# Patient Record
Sex: FEMALE
[De-identification: names, ages, dates, MRNs, and addresses within clinical notes are randomized per-mention and may not be internally consistent; named-entity substitution may affect disease eponyms.]

---

## 2018-01-10 ENCOUNTER — HOSPITAL ENCOUNTER (INPATIENT)
Dept: HOSPITAL 88 - ER | Age: 78
LOS: 9 days | Discharge: HOME HEALTH | DRG: 871 | End: 2018-01-19
Attending: INTERNAL MEDICINE | Admitting: INTERNAL MEDICINE
Payer: COMMERCIAL

## 2018-01-10 VITALS — WEIGHT: 115 LBS | BODY MASS INDEX: 21.16 KG/M2 | HEIGHT: 62 IN

## 2018-01-10 DIAGNOSIS — F32.9: ICD-10-CM

## 2018-01-10 DIAGNOSIS — I95.9: ICD-10-CM

## 2018-01-10 DIAGNOSIS — B37.81: ICD-10-CM

## 2018-01-10 DIAGNOSIS — K29.70: ICD-10-CM

## 2018-01-10 DIAGNOSIS — E86.0: ICD-10-CM

## 2018-01-10 DIAGNOSIS — Z79.01: ICD-10-CM

## 2018-01-10 DIAGNOSIS — D64.9: ICD-10-CM

## 2018-01-10 DIAGNOSIS — E87.6: ICD-10-CM

## 2018-01-10 DIAGNOSIS — Z79.82: ICD-10-CM

## 2018-01-10 DIAGNOSIS — N39.0: ICD-10-CM

## 2018-01-10 DIAGNOSIS — A41.9: Primary | ICD-10-CM

## 2018-01-10 DIAGNOSIS — R65.20: ICD-10-CM

## 2018-01-10 DIAGNOSIS — I21.4: ICD-10-CM

## 2018-01-10 DIAGNOSIS — B96.20: ICD-10-CM

## 2018-01-10 DIAGNOSIS — K44.9: ICD-10-CM

## 2018-01-10 DIAGNOSIS — I10: ICD-10-CM

## 2018-01-10 DIAGNOSIS — I48.91: ICD-10-CM

## 2018-01-10 DIAGNOSIS — M19.90: ICD-10-CM

## 2018-01-10 DIAGNOSIS — K52.9: ICD-10-CM

## 2018-01-10 LAB
ALBUMIN SERPL-MCNC: 2.8 G/DL (ref 3.5–5)
ALBUMIN/GLOB SERPL: 0.8 {RATIO} (ref 0.8–2)
ALP SERPL-CCNC: 96 IU/L (ref 40–150)
ALT SERPL-CCNC: 9 IU/L (ref 0–55)
AMPHETAMINES UR QL SCN>1000 NG/ML: NEGATIVE
AMYLASE SERPL-CCNC: 51 U/L (ref 25–125)
ANION GAP SERPL CALC-SCNC: 12.3 MMOL/L (ref 8–16)
ANION GAP SERPL CALC-SCNC: 14.3 MMOL/L (ref 8–16)
BACTERIA URNS QL MICRO: (no result) /HPF
BASOPHILS # BLD AUTO: 0 10*3/UL (ref 0–0.1)
BASOPHILS # BLD AUTO: 0.1 10*3/UL (ref 0–0.1)
BASOPHILS # BLD AUTO: 0.1 10*3/UL (ref 0–0.1)
BASOPHILS NFR BLD AUTO: 0.2 % (ref 0–1)
BASOPHILS NFR BLD AUTO: 0.4 % (ref 0–1)
BASOPHILS NFR BLD AUTO: 0.5 % (ref 0–1)
BENZODIAZ UR QL SCN: POSITIVE
BILIRUB UR QL: (no result)
BNP BLD-MCNC: 95.6 PG/ML (ref 0–100)
BUN SERPL-MCNC: 10 MG/DL (ref 7–26)
BUN SERPL-MCNC: 15 MG/DL (ref 7–26)
BUN/CREAT SERPL: 16 (ref 6–25)
BUN/CREAT SERPL: 20 (ref 6–25)
CALCIUM SERPL-MCNC: 6.8 MG/DL (ref 8.4–10.2)
CALCIUM SERPL-MCNC: 8.2 MG/DL (ref 8.4–10.2)
CHLORIDE SERPL-SCNC: 109 MMOL/L (ref 98–107)
CHLORIDE SERPL-SCNC: 115 MMOL/L (ref 98–107)
CK MB SERPL-MCNC: 3.2 NG/ML (ref 0–5)
CK MB SERPL-MCNC: 8.7 NG/ML (ref 0–5)
CK MB SERPL-MCNC: 9.1 NG/ML (ref 0–5)
CK SERPL-CCNC: 109 IU/L (ref 29–168)
CK SERPL-CCNC: 122 IU/L (ref 29–168)
CK SERPL-CCNC: 55 IU/L (ref 29–168)
CLARITY UR: (no result)
CO2 SERPL-SCNC: 16 MMOL/L (ref 22–29)
CO2 SERPL-SCNC: 18 MMOL/L (ref 22–29)
COLOR UR: YELLOW
DEPRECATED APTT PLAS QN: 26.7 SECONDS (ref 23.8–35.5)
DEPRECATED INR PLAS: 0.98
DEPRECATED NEUTROPHILS # BLD AUTO: 10.2 10*3/UL (ref 2.1–6.9)
DEPRECATED NEUTROPHILS # BLD AUTO: 11.4 10*3/UL (ref 2.1–6.9)
DEPRECATED NEUTROPHILS # BLD AUTO: 7.1 10*3/UL (ref 2.1–6.9)
DEPRECATED PHOSPHATE SERPL-MCNC: 2.6 MG/DL (ref 2.3–4.7)
DEPRECATED RBC URNS MANUAL-ACNC: (no result) /HPF (ref 0–5)
EGFRCR SERPLBLD CKD-EPI 2021: > 60 ML/MIN (ref 60–?)
EGFRCR SERPLBLD CKD-EPI 2021: > 60 ML/MIN (ref 60–?)
EOSINOPHIL # BLD AUTO: 0 10*3/UL (ref 0–0.4)
EOSINOPHIL NFR BLD AUTO: 0 % (ref 0–6)
EOSINOPHIL NFR BLD AUTO: 0.1 % (ref 0–6)
EOSINOPHIL NFR BLD AUTO: 0.1 % (ref 0–6)
EPI CELLS URNS QL MICRO: (no result) /LPF
ERYTHROCYTE [DISTWIDTH] IN CORD BLOOD: 17.4 % (ref 11.7–14.4)
ERYTHROCYTE [DISTWIDTH] IN CORD BLOOD: 17.6 % (ref 11.7–14.4)
ERYTHROCYTE [DISTWIDTH] IN CORD BLOOD: 17.6 % (ref 11.7–14.4)
GLOBULIN PLAS-MCNC: 3.4 G/DL (ref 2.3–3.5)
GLUCOSE SERPLBLD-MCNC: 141 MG/DL (ref 74–118)
GLUCOSE SERPLBLD-MCNC: 147 MG/DL (ref 74–118)
HCT VFR BLD AUTO: 25.5 % (ref 34.2–44.1)
HCT VFR BLD AUTO: 27.9 % (ref 34.2–44.1)
HCT VFR BLD AUTO: 32.9 % (ref 34.2–44.1)
HGB BLD-MCNC: 10.3 G/DL (ref 12–16)
HGB BLD-MCNC: 7.9 G/DL (ref 12–16)
HGB BLD-MCNC: 8.7 G/DL (ref 12–16)
KETONES UR QL STRIP.AUTO: NEGATIVE
LEUKOCYTE ESTERASE UR QL STRIP.AUTO: (no result)
LIPASE SERPL-CCNC: 17 U/L (ref 8–78)
LYMPHOCYTES # BLD: 0.3 10*3/UL (ref 1–3.2)
LYMPHOCYTES # BLD: 0.6 10*3/UL (ref 1–3.2)
LYMPHOCYTES # BLD: 0.7 10*3/UL (ref 1–3.2)
LYMPHOCYTES NFR BLD AUTO: 2.4 % (ref 18–39.1)
LYMPHOCYTES NFR BLD AUTO: 6.1 % (ref 18–39.1)
LYMPHOCYTES NFR BLD AUTO: 8 % (ref 18–39.1)
MAGNESIUM SERPL-MCNC: 1.2 MG/DL (ref 1.3–2.1)
MAGNESIUM SERPL-MCNC: 1.5 MG/DL (ref 1.3–2.1)
MCH RBC QN AUTO: 26.8 PG (ref 28–32)
MCH RBC QN AUTO: 26.9 PG (ref 28–32)
MCH RBC QN AUTO: 26.9 PG (ref 28–32)
MCHC RBC AUTO-ENTMCNC: 31 G/DL (ref 31–35)
MCHC RBC AUTO-ENTMCNC: 31.2 G/DL (ref 31–35)
MCHC RBC AUTO-ENTMCNC: 31.3 G/DL (ref 31–35)
MCV RBC AUTO: 85.9 FL (ref 81–99)
MCV RBC AUTO: 86.4 FL (ref 81–99)
MCV RBC AUTO: 86.4 FL (ref 81–99)
MONOCYTES # BLD AUTO: 0.1 10*3/UL (ref 0.2–0.8)
MONOCYTES # BLD AUTO: 0.2 10*3/UL (ref 0.2–0.8)
MONOCYTES # BLD AUTO: 0.3 10*3/UL (ref 0.2–0.8)
MONOCYTES NFR BLD AUTO: 1.2 % (ref 4.4–11.3)
MONOCYTES NFR BLD AUTO: 2.2 % (ref 4.4–11.3)
MONOCYTES NFR BLD AUTO: 2.5 % (ref 4.4–11.3)
NEUTS SEG NFR BLD AUTO: 89.1 % (ref 38.7–80)
NEUTS SEG NFR BLD AUTO: 90.8 % (ref 38.7–80)
NEUTS SEG NFR BLD AUTO: 95.5 % (ref 38.7–80)
NITRITE UR QL STRIP.AUTO: POSITIVE
PCP UR QL SCN: POSITIVE
PLATELET # BLD AUTO: 240 X10E3/UL (ref 140–360)
PLATELET # BLD AUTO: 265 X10E3/UL (ref 140–360)
PLATELET # BLD AUTO: 305 X10E3/UL (ref 140–360)
POTASSIUM SERPL-SCNC: 3.3 MMOL/L (ref 3.5–5.1)
POTASSIUM SERPL-SCNC: 4.3 MMOL/L (ref 3.5–5.1)
PROT UR QL STRIP.AUTO: (no result)
PROTHROMBIN TIME: 13.5 SECONDS (ref 11.9–14.5)
RBC # BLD AUTO: 2.95 X10E6/UL (ref 3.6–5.1)
RBC # BLD AUTO: 3.23 X10E6/UL (ref 3.6–5.1)
RBC # BLD AUTO: 3.83 X10E6/UL (ref 3.6–5.1)
SODIUM SERPL-SCNC: 138 MMOL/L (ref 136–145)
SODIUM SERPL-SCNC: 139 MMOL/L (ref 136–145)
SP GR UR STRIP: 1 (ref 1.01–1.02)
TROPONIN I SERPL DL<=0.01 NG/ML-MCNC: 0.74 NG/ML (ref 0–0.3)
TROPONIN I SERPL DL<=0.01 NG/ML-MCNC: 1.39 NG/ML (ref 0–0.3)
TROPONIN I SERPL DL<=0.01 NG/ML-MCNC: 1.97 NG/ML (ref 0–0.3)
UROBILINOGEN UR STRIP-MCNC: 0.2 MG/DL (ref 0.2–1)
WBC #/AREA URNS HPF: (no result) /HPF (ref 0–5)

## 2018-01-10 PROCEDURE — 81001 URINALYSIS AUTO W/SCOPE: CPT

## 2018-01-10 PROCEDURE — 87086 URINE CULTURE/COLONY COUNT: CPT

## 2018-01-10 PROCEDURE — 85651 RBC SED RATE NONAUTOMATED: CPT

## 2018-01-10 PROCEDURE — 83690 ASSAY OF LIPASE: CPT

## 2018-01-10 PROCEDURE — 82550 ASSAY OF CK (CPK): CPT

## 2018-01-10 PROCEDURE — 88305 TISSUE EXAM BY PATHOLOGIST: CPT

## 2018-01-10 PROCEDURE — 71010: CPT

## 2018-01-10 PROCEDURE — 86140 C-REACTIVE PROTEIN: CPT

## 2018-01-10 PROCEDURE — 84132 ASSAY OF SERUM POTASSIUM: CPT

## 2018-01-10 PROCEDURE — 86850 RBC ANTIBODY SCREEN: CPT

## 2018-01-10 PROCEDURE — 87186 SC STD MICRODIL/AGAR DIL: CPT

## 2018-01-10 PROCEDURE — 87400 INFLUENZA A/B EACH AG IA: CPT

## 2018-01-10 PROCEDURE — 85025 COMPLETE CBC W/AUTO DIFF WBC: CPT

## 2018-01-10 PROCEDURE — 3E033XZ INTRODUCTION OF VASOPRESSOR INTO PERIPHERAL VEIN, PERCUTANEOUS APPROACH: ICD-10-PCS | Performed by: EMERGENCY MEDICINE

## 2018-01-10 PROCEDURE — 70450 CT HEAD/BRAIN W/O DYE: CPT

## 2018-01-10 PROCEDURE — 85610 PROTHROMBIN TIME: CPT

## 2018-01-10 PROCEDURE — 83880 ASSAY OF NATRIURETIC PEPTIDE: CPT

## 2018-01-10 PROCEDURE — 80048 BASIC METABOLIC PNL TOTAL CA: CPT

## 2018-01-10 PROCEDURE — 86021 WBC ANTIBODY IDENTIFICATION: CPT

## 2018-01-10 PROCEDURE — 74177 CT ABD & PELVIS W/CONTRAST: CPT

## 2018-01-10 PROCEDURE — 93306 TTE W/DOPPLER COMPLETE: CPT

## 2018-01-10 PROCEDURE — 43239 EGD BIOPSY SINGLE/MULTIPLE: CPT

## 2018-01-10 PROCEDURE — 71020: CPT

## 2018-01-10 PROCEDURE — 88312 SPECIAL STAINS GROUP 1: CPT

## 2018-01-10 PROCEDURE — 80053 COMPREHEN METABOLIC PANEL: CPT

## 2018-01-10 PROCEDURE — 87493 C DIFF AMPLIFIED PROBE: CPT

## 2018-01-10 PROCEDURE — 87040 BLOOD CULTURE FOR BACTERIA: CPT

## 2018-01-10 PROCEDURE — 86900 BLOOD TYPING SEROLOGIC ABO: CPT

## 2018-01-10 PROCEDURE — 82270 OCCULT BLOOD FECES: CPT

## 2018-01-10 PROCEDURE — 84484 ASSAY OF TROPONIN QUANT: CPT

## 2018-01-10 PROCEDURE — 80307 DRUG TEST PRSMV CHEM ANLYZR: CPT

## 2018-01-10 PROCEDURE — 83605 ASSAY OF LACTIC ACID: CPT

## 2018-01-10 PROCEDURE — 93005 ELECTROCARDIOGRAM TRACING: CPT

## 2018-01-10 PROCEDURE — 74250 X-RAY XM SM INT 1CNTRST STD: CPT

## 2018-01-10 PROCEDURE — 45380 COLONOSCOPY AND BIOPSY: CPT

## 2018-01-10 PROCEDURE — 84100 ASSAY OF PHOSPHORUS: CPT

## 2018-01-10 PROCEDURE — 83735 ASSAY OF MAGNESIUM: CPT

## 2018-01-10 PROCEDURE — 99284 EMERGENCY DEPT VISIT MOD MDM: CPT

## 2018-01-10 PROCEDURE — 85730 THROMBOPLASTIN TIME PARTIAL: CPT

## 2018-01-10 PROCEDURE — 82553 CREATINE MB FRACTION: CPT

## 2018-01-10 PROCEDURE — 36415 COLL VENOUS BLD VENIPUNCTURE: CPT

## 2018-01-10 PROCEDURE — 82150 ASSAY OF AMYLASE: CPT

## 2018-01-10 PROCEDURE — 02H633Z INSERTION OF INFUSION DEVICE INTO RIGHT ATRIUM, PERCUTANEOUS APPROACH: ICD-10-PCS | Performed by: EMERGENCY MEDICINE

## 2018-01-10 RX ADMIN — ENOXAPARIN SODIUM SCH MG: 60 INJECTION SUBCUTANEOUS at 22:41

## 2018-01-10 RX ADMIN — POTASSIUM CHLORIDE SCH MLS/HR: 7.46 INJECTION, SOLUTION INTRAVENOUS at 09:37

## 2018-01-10 RX ADMIN — ENOXAPARIN SODIUM SCH MG: 60 INJECTION SUBCUTANEOUS at 13:10

## 2018-01-10 RX ADMIN — TAZOBACTAM SODIUM AND PIPERACILLIN SODIUM SCH MLS/HR: 375; 3 INJECTION, SOLUTION INTRAVENOUS at 13:00

## 2018-01-10 RX ADMIN — TEMAZEPAM SCH MG: 15 CAPSULE ORAL at 22:41

## 2018-01-10 RX ADMIN — MESALAMINE SCH MG: 500 CAPSULE ORAL at 22:41

## 2018-01-10 RX ADMIN — METOPROLOL TARTRATE SCH MG: 25 TABLET, FILM COATED ORAL at 15:59

## 2018-01-10 RX ADMIN — METRONIDAZOLE SCH MLS/HR: 500 INJECTION, SOLUTION INTRAVENOUS at 06:10

## 2018-01-10 RX ADMIN — METRONIDAZOLE SCH MLS/HR: 500 INJECTION, SOLUTION INTRAVENOUS at 11:37

## 2018-01-10 RX ADMIN — Medication SCH MG: at 22:51

## 2018-01-10 RX ADMIN — TAZOBACTAM SODIUM AND PIPERACILLIN SODIUM SCH MLS/HR: 375; 3 INJECTION, SOLUTION INTRAVENOUS at 08:43

## 2018-01-10 RX ADMIN — METOPROLOL TARTRATE SCH MG: 25 TABLET, FILM COATED ORAL at 09:37

## 2018-01-10 RX ADMIN — Medication SCH MG: at 09:37

## 2018-01-10 RX ADMIN — METRONIDAZOLE SCH MLS/HR: 500 INJECTION, SOLUTION INTRAVENOUS at 17:47

## 2018-01-10 RX ADMIN — SODIUM CHLORIDE SCH MLS/HR: 9 INJECTION, SOLUTION INTRAVENOUS at 09:37

## 2018-01-10 RX ADMIN — SODIUM CHLORIDE SCH MLS/HR: 9 INJECTION, SOLUTION INTRAVENOUS at 17:10

## 2018-01-10 RX ADMIN — TAZOBACTAM SODIUM AND PIPERACILLIN SODIUM SCH MLS/HR: 375; 3 INJECTION, SOLUTION INTRAVENOUS at 17:10

## 2018-01-10 RX ADMIN — Medication SCH MG: at 13:10

## 2018-01-10 RX ADMIN — POTASSIUM CHLORIDE SCH MLS/HR: 7.46 INJECTION, SOLUTION INTRAVENOUS at 06:49

## 2018-01-10 RX ADMIN — SODIUM CHLORIDE SCH MG: 900 INJECTION INTRAVENOUS at 09:37

## 2018-01-10 RX ADMIN — SODIUM CHLORIDE SCH MG: 900 INJECTION INTRAVENOUS at 22:41

## 2018-01-10 NOTE — DIAGNOSTIC IMAGING REPORT
PROCEDURE:

A single AP view of the chest.

 

COMPARISON: Chest 2 views 1/10/2018.

 

INDICATIONS:   CENTRAL LINE PLACEMENT

     

FINDINGS:

Lines/tubes: Right internal jugular temporary central venous catheter 

with tip projecting over the expected region of the right atrium.

 

Lungs:  The lungs are well inflated and clear. There is no evidence of 

pneumonia or pulmonary edema.

 

Pleura:  There is no pleural effusion or pneumothorax.

 

Heart and mediastinum:  The heart and the mediastinum are unremarkable. 

Atherosclerotic calcifications.

 

Bones:  No acute bony abnormality. Degenerative changes of the thoracic 

spine.

 

IMPRESSION: 

 

No acute radiographic abnormality.

 

Dictated by:  Jax Mohamud M.D. on 1/10/2018 at 8:29     

Electronically approved by:  Jax Mohamud M.D. on 1/10/2018 at 8:29

## 2018-01-10 NOTE — CONSULTATION
DATE OF CONSULTATION:  January 10, 2018 



CARDIOLOGY CONSULTATION



REASON FOR CONSULTATION:  Elevated troponin. 



HPI:  This is a 77-year-old female that presented with vomiting, diarrhea 

and abdominal cramps.  According to the patient, for the last 2-3 days she 

has been having intermittent abdominal pain with nausea, mild vomiting, and 

cramping.  She has a history of Crohn disease in the past.  She stated also 

that emesis was tinged with blood, and she decided to come into the 

emergency room for evaluation.  She had a history of CAD and had a cardiac 

catheterization in 2016 with medical management.  She denies any chest 

pain, any palpitations, any diaphoresis, or any dizziness.  She was found 

to have a low blood pressure, and she was started on Levophed drip.



PAST MEDICAL HISTORY:  GERD, depression, CVA, hypertension, ulcerative 

colitis, anemia, UTI, C. diff, and osteoarthritis.



PAST SURGICAL HISTORY:  Hysterectomy, left knee surgery.  She had 

appendectomy and cholecystectomy also.  



FAMILY HISTORY:  Noncontributory.



SOCIAL HISTORY:  She lives at home with family.



MEDICATIONS:  See med list.



ALLERGIES:  SHE IS NOT ALLERGIC TO ANY MEDICATIONS.



REVIEW OF SYSTEMS:  Negative except as mentioned above.   

 

PHYSICAL EXAMINATION

VITAL SIGNS:  Temperature 99, heart rate 95, blood pressure 123/60, 

respiration 18, oxygen saturation 99% on room.  

GENERAL:  She is alert, awake and oriented times 3. 

HEENT:  Mucous membrane moist. 

NECK:  Supple. 

LUNGS:  With decreased breath sounds. 

CARDIOVASCULAR:  S1 and S2 present. 

ABDOMEN:  Soft. 

NEUROLOGICAL:  Intact. 

EXTREMITIES:  No edema. 



LABS:  Sodium 138, potassium 3.3, chloride 109, CO2 18, BUN 15, creatinine 

0.76, glucose 147.  White blood cell 11.8, hemoglobin 10.3, hematocrit 

32.9, and platelets 305,000.  PT 13.5, PTT 26.7 and INR 0.98.  



IMPRESSION 

1.  Non-ST-segment elevation myocardial infarction.  

2.  Hypotension.

3.  Gastrointestinal bleed.

4.  Gastroenteritis.

6.  Hypokalemia.



ASSESSMENT AND PLAN:  Will go ahead and get an echo to reassess the LV and 

the valve function.  Will get serial cardiac enzymes.  Try to wean off 

Levophed drip.  Replace potassium.  She had a cardiac catheterization in 

January 2016 with medical management.  Further cardiac workup pending 

clinical course.  





Thank you for this consultation.



DICTATED BY SEAMUS FELIZ NP



 





DD:  01/10/2018 09:16

DT:  01/10/2018 09:51

Job#:  [id number]

## 2018-01-10 NOTE — HISTORY AND PHYSICAL
PRIMARY CARE PHYSICIAN:  Dr. Barrios



CHIEF COMPLAINT:  Nausea, vomiting and diarrhea.



HISTORY OF PRESENT ILLNESS:  This is a 77-year-old woman with a history of 

colitis and enteritis, who has had an initial diagnosis of Crohn and 

subsequent testing revealed that it probably was not Crohn, who has been 

having intermittent diarrhea for the past 3 years now with worsening 

symptoms.  Therefore, came to the hospital.  Here, she was found to have 

acute enteritis and also found to be hypotensive.  She was admitted for 

further evaluation and management.



PAST MEDICAL HISTORY:  Acute enteritis, hypokalemia, hypertension, 

gastroenteritis, atrial fibrillation, depression, Clostridium difficile 

colitis, urinary tract infection, iron deficiency anemia.



PAST SURGICAL HISTORY:  Unknown.



ALLERGIES:  PER ELECTRONIC MEDICAL RECORDS.



FAMILY HISTORY/SOCIAL HISTORY:  Patient appears to have drank a lot of 

alcohol.  Now does not.  She denies any cigarettes or illicits.  She lives 

alone.  She is .  



REVIEW OF SYSTEMS:  Denies any dizziness or chest pain.



PHYSICAL EXAMINATION 

VITAL SIGNS:  Reviewed.

GENERAL:  A tired-appearing woman resting in bed. 

HEENT:  Anicteric.  

CARDIOVASCULAR:  Normal S1 and S2.  

LUNGS:  Moderate breath sounds.  

ABDOMEN:  Soft and nondistended.  She has tenderness in the right lower 

quadrant. 

EXTREMITIES:  No edema.

SKIN:  Dry.

PSYCHIATRIC:  Flat affect.



LABS:  Reviewed.



MEDICATIONS:  Reviewed.



ASSESSMENT AND PLAN:  This is  77-year-old woman with:

1.  Acute enteritis:  Will treat with intravenous fluids and broad-spectrum 

antibiotics.  One dose of vancomycin.  Continue Flagyl and Zosyn.  Follow 

up cultures.  Consult Dr. Wilcox.

2.  Urinary tract infection:  Continue Zosyn.  Follow up cultures.

3.  Gastrointestinal bleed:  Positive stool occult blood.  Gastroenterology 

service is consulted.  Continue intravenous proton pump inhibitor q.12 h.

4.  Severe sepsis:  On antibiotics as described above and intravenous 

fluids.  Follow up cultures.  Will admit to the intensive care unit.  Will 

treat her with hydrocortisone 100 mg intravenously q.8 h. 

5.  Atrial fibrillation:  Heart rate currently controlled.  Will treat with 

intravenous fluids.  Heart rate initially was 115. 

6.  Normocytic anemia, mild:  Will follow.

7.  Hypokalemia:  Replace and recheck.

8.  Prophylaxis:  Will use sequential compression devices and proton pump 

inhibitor.

9.  Disposition:  Will recheck labs this afternoon.  Critical care time 

more than 35 minutes. 











DD:  01/10/2018 07:50

DT:  01/10/2018 07:52

Job#:  [id number]

## 2018-01-10 NOTE — DIAGNOSTIC IMAGING REPORT
Exam: Head CT without contrast

History:  Nausea, vomiting, headache

Comparison studies:  Previous head CT of 7/29/2014 is currently unavailable on

the PACS for comparison.



Technique:

Axial images were obtained from the skull base to the vertex.

Coronal and sagittal images reconstructed from the axial data.

Intravenous contrast: None



Findings:



Scalp: No abnormalities.

Bones: The sphenoid wings are demineralized bilaterally with lytic changes. No

associated soft tissue mass is identified.



Brain sulci: Appropriate for age.

Ventricles: Normal in size and configuration. No hydrocephalus.

Extra-axial spaces: Mildly prominent axial spaces of CSF density along the

right anterior temporal convexity is most likely represents a small arachnoid

cyst. 



Parenchyma: 

No mass, acute hemorrhage or acute cortical vascular insults. Scattered

hypodensities in the supratentorial white matter are nonspecific but most

compatible with chronic small vessel ischemic changes.





Sellar/suprasellar region: No abnormalities.

Craniocervical junction: Patent foramen magnum. No Chiari one malformation.



Included paranasal sinuses: Mild inflammatory mucosal thickening in the left

maxillary sinus.



Incidental findings: 

Atherosclerotic calcifications in the carotid siphons and intradural vertebral

arteries. Bilateral lens replacements related to previous cataract surgery.





IMPRESSION:

 

1.  No acute intracranial abnormalities.

2.  Mild generalized volume loss.

3.  Moderate supratentorial chronic microvascular ischemic changes.

4.  Incidental bilateral sphenoid demineralization with lytic changes.



Signed by: Dr. Julian Green M.D. on 1/10/2018 5:47 AM

## 2018-01-10 NOTE — DIAGNOSTIC IMAGING REPORT
EXAM: CHEST 2 VIEWS, PA and lateral

DATE: 1/10/2018 4:08 AM  Time stamp on exam: 0421 hours

INDICATION: Nausea, vomiting

COMPARISON: None



FINDINGS:

LINES/TUBES: None



LUNGS: No consolidations or edema. 



PLEURA: No effusions or pneumothorax.



HEART AND MEDIASTINUM: Normal size and contour.



BONES AND SOFT TISSUES: No acute findings. Hiatal hernia.



IMPRESSION:

No acute thoracic abnormality.







Signed by: Dr. Toya Collazo M.D. on 1/10/2018 5:05 AM

## 2018-01-10 NOTE — DIAGNOSTIC IMAGING REPORT
PROCEDURE:SMALL BOWEL SERIES

COMPARISON:[facility], CT, CT ABDOMEN/PELVIS W, 

1/10/2018, 5:09.

INDICATIONS:ENTERITIS

 

TEQUNIQUE: A  radiograph was performed. The patient was given 

barium to drink and sequential images of the abdomen were obtained 

through 90 minutes until the contrast had reached the colon. Spot 

images of the small bowel and terminal ileum were obtained.

Fluoro time: 3.3 minutes

 

FINDINGS:

: Nonobstructive bowel gas pattern. Contrast is noted in the 

bladder.

 

Small bowel: 

Multiple focal outpouchings are noted in the duodenum, consistent with 

diverticula.

Evaluation of the proximal small bowel is limited by flocculation of 

contrast.

Transit time is normal.

There are several loops of distal ileum which show long segments of 

luminal reduction and cobblestoning of the mucosa, corresponding to 

previously visualized thickened ileum on CT dated 1/10/2018 

The terminal ileum is not well-visualized.

No focal mass is seen.

 

 

CONCLUSION:

Findings in the distal ileum consistent with previously visualized 

enteritis on CT dated 1/10/2018. Inflammatory bowel disease 

(particularly Crohn's) is a consideration despite the patient's age, 

given the history of prior episodes. Infectious enteritis is less 

likely. 

 

Maxwell Barnett M.D.  

Dictated by:  Maxwell Barnett M.D. on 1/10/2018 at 19:40     

Electronically approved by:  Maxwell Barnett M.D. on 1/10/2018 at 

19:40

## 2018-01-10 NOTE — DIAGNOSTIC IMAGING REPORT
EXAM: CT ABDOMEN AND PELVIS with IV CONTRAST

DATE: 1/10/2018 4:08 AM  Time stamp on Exam: 0512 hours

INDICATION:  Abdominal pain

COMPARISON:  None available

TECHNIQUE: The abdomen and pelvis were scanned using a multidetector helical

scanner. Coronal and sagittal reformations were obtained. Routine protocol

performed.

IV Contrast: 100 cc Isovue-370

Oral Contrast: Water

CTDIvol has been reviewed. It is below the limits set by the Radiation Protocol

Committee (RPC).



FINDINGS:

LOWER THORAX: No consolidations



LIVER: No masses

BILIARY: Cholelithiasis. No wall thickening or ductal dilation. 



SPLEEN: No masses

PANCREAS: No masses



ADRENALS: No nodules

KIDNEYS: Symmetric perfusion. No enhancing masses. No hydronephrosis.



GI TRACT: Several loops of small bowel with enhancing thickened walls and

adjacent vascular engorgement. No bowel obstruction. Moderate to large hiatal

hernia. Incidental duodenal diverticulum.



VESSELS: Marked atherosclerotic change of the abdominal aorta and branches.

PERITONEUM/RETROPERITONEUM: Trace free pelvic fluid.

LYMPH NODES: Prominent mesenteric lymph nodes.



REPRODUCTIVE ORGANS: The uterus and ovaries are not visualized.

BLADDER: Unremarkable



SOFT TISSUES: Unremarkable

BONES: No suspicious bone lesions.



IMPRESSION:

Nonspecific enteritis involving the distal and terminal ileum with associated

vascular congestion and mesenteric lymphadenopathy. These findings were

described in a report of a CT of the abdomen and pelvis in 2014 (images not

available for comparison), making inflammatory enteritis (Crohn's) a likely

cause.



Signed by: Dr. Toya Collazo M.D. on 1/10/2018 5:44 AM

## 2018-01-11 VITALS — SYSTOLIC BLOOD PRESSURE: 143 MMHG | DIASTOLIC BLOOD PRESSURE: 84 MMHG

## 2018-01-11 VITALS — DIASTOLIC BLOOD PRESSURE: 74 MMHG | SYSTOLIC BLOOD PRESSURE: 126 MMHG

## 2018-01-11 VITALS — SYSTOLIC BLOOD PRESSURE: 108 MMHG | DIASTOLIC BLOOD PRESSURE: 71 MMHG

## 2018-01-11 VITALS — DIASTOLIC BLOOD PRESSURE: 77 MMHG | SYSTOLIC BLOOD PRESSURE: 143 MMHG

## 2018-01-11 VITALS — SYSTOLIC BLOOD PRESSURE: 156 MMHG | DIASTOLIC BLOOD PRESSURE: 92 MMHG

## 2018-01-11 LAB
ALBUMIN SERPL-MCNC: 2.5 G/DL (ref 3.5–5)
ALBUMIN/GLOB SERPL: 0.8 {RATIO} (ref 0.8–2)
ALP SERPL-CCNC: 65 IU/L (ref 40–150)
ALT SERPL-CCNC: 10 IU/L (ref 0–55)
ANION GAP SERPL CALC-SCNC: 12.4 MMOL/L (ref 8–16)
BASOPHILS # BLD AUTO: 0 10*3/UL (ref 0–0.1)
BASOPHILS NFR BLD AUTO: 0.3 % (ref 0–1)
BASOPHILS NFR BLD AUTO: 0.3 % (ref 0–1)
BASOPHILS NFR BLD AUTO: 0.4 % (ref 0–1)
BUN SERPL-MCNC: 8 MG/DL (ref 7–26)
BUN/CREAT SERPL: 12 (ref 6–25)
CALCIUM SERPL-MCNC: 7.5 MG/DL (ref 8.4–10.2)
CHLORIDE SERPL-SCNC: 114 MMOL/L (ref 98–107)
CO2 SERPL-SCNC: 17 MMOL/L (ref 22–29)
DEPRECATED NEUTROPHILS # BLD AUTO: 5.7 10*3/UL (ref 2.1–6.9)
DEPRECATED NEUTROPHILS # BLD AUTO: 7 10*3/UL (ref 2.1–6.9)
DEPRECATED NEUTROPHILS # BLD AUTO: 8.5 10*3/UL (ref 2.1–6.9)
EGFRCR SERPLBLD CKD-EPI 2021: > 60 ML/MIN (ref 60–?)
EOSINOPHIL # BLD AUTO: 0 10*3/UL (ref 0–0.4)
EOSINOPHIL NFR BLD AUTO: 0 % (ref 0–6)
ERYTHROCYTE [DISTWIDTH] IN CORD BLOOD: 17.7 % (ref 11.7–14.4)
ERYTHROCYTE [DISTWIDTH] IN CORD BLOOD: 18 % (ref 11.7–14.4)
ERYTHROCYTE [DISTWIDTH] IN CORD BLOOD: 18 % (ref 11.7–14.4)
GLOBULIN PLAS-MCNC: 3 G/DL (ref 2.3–3.5)
GLUCOSE SERPLBLD-MCNC: 179 MG/DL (ref 74–118)
HCT VFR BLD AUTO: 26.5 % (ref 34.2–44.1)
HCT VFR BLD AUTO: 29.3 % (ref 34.2–44.1)
HCT VFR BLD AUTO: 30.6 % (ref 34.2–44.1)
HGB BLD-MCNC: 8.2 G/DL (ref 12–16)
HGB BLD-MCNC: 8.7 G/DL (ref 12–16)
HGB BLD-MCNC: 9.3 G/DL (ref 12–16)
LYMPHOCYTES # BLD: 0.6 10*3/UL (ref 1–3.2)
LYMPHOCYTES # BLD: 0.6 10*3/UL (ref 1–3.2)
LYMPHOCYTES # BLD: 0.8 10*3/UL (ref 1–3.2)
LYMPHOCYTES NFR BLD AUTO: 8 % (ref 18–39.1)
LYMPHOCYTES NFR BLD AUTO: 8.6 % (ref 18–39.1)
LYMPHOCYTES NFR BLD AUTO: 8.7 % (ref 18–39.1)
MCH RBC QN AUTO: 26.8 PG (ref 28–32)
MCH RBC QN AUTO: 26.9 PG (ref 28–32)
MCH RBC QN AUTO: 26.9 PG (ref 28–32)
MCHC RBC AUTO-ENTMCNC: 29.7 G/DL (ref 31–35)
MCHC RBC AUTO-ENTMCNC: 30.4 G/DL (ref 31–35)
MCHC RBC AUTO-ENTMCNC: 30.9 G/DL (ref 31–35)
MCV RBC AUTO: 86.6 FL (ref 81–99)
MCV RBC AUTO: 88.4 FL (ref 81–99)
MCV RBC AUTO: 90.4 FL (ref 81–99)
MONOCYTES # BLD AUTO: 0.2 10*3/UL (ref 0.2–0.8)
MONOCYTES # BLD AUTO: 0.3 10*3/UL (ref 0.2–0.8)
MONOCYTES # BLD AUTO: 0.3 10*3/UL (ref 0.2–0.8)
MONOCYTES NFR BLD AUTO: 3.2 % (ref 4.4–11.3)
MONOCYTES NFR BLD AUTO: 3.2 % (ref 4.4–11.3)
MONOCYTES NFR BLD AUTO: 3.8 % (ref 4.4–11.3)
NEUTS SEG NFR BLD AUTO: 87.3 % (ref 38.7–80)
NEUTS SEG NFR BLD AUTO: 87.5 % (ref 38.7–80)
NEUTS SEG NFR BLD AUTO: 87.5 % (ref 38.7–80)
PLATELET # BLD AUTO: 233 X10E3/UL (ref 140–360)
PLATELET # BLD AUTO: 275 X10E3/UL (ref 140–360)
PLATELET # BLD AUTO: 327 X10E3/UL (ref 140–360)
POTASSIUM SERPL-SCNC: 4.4 MMOL/L (ref 3.5–5.1)
RBC # BLD AUTO: 3.06 X10E6/UL (ref 3.6–5.1)
RBC # BLD AUTO: 3.24 X10E6/UL (ref 3.6–5.1)
RBC # BLD AUTO: 3.46 X10E6/UL (ref 3.6–5.1)
SODIUM SERPL-SCNC: 139 MMOL/L (ref 136–145)

## 2018-01-11 RX ADMIN — METRONIDAZOLE SCH MLS/HR: 500 INJECTION, SOLUTION INTRAVENOUS at 17:03

## 2018-01-11 RX ADMIN — ENOXAPARIN SODIUM SCH MG: 60 INJECTION SUBCUTANEOUS at 08:32

## 2018-01-11 RX ADMIN — Medication SCH MG: at 14:10

## 2018-01-11 RX ADMIN — Medication SCH MG: at 05:27

## 2018-01-11 RX ADMIN — METRONIDAZOLE SCH MLS/HR: 500 INJECTION, SOLUTION INTRAVENOUS at 05:27

## 2018-01-11 RX ADMIN — SODIUM CHLORIDE SCH MG: 900 INJECTION INTRAVENOUS at 20:46

## 2018-01-11 RX ADMIN — SODIUM CHLORIDE SCH MG: 900 INJECTION INTRAVENOUS at 08:32

## 2018-01-11 RX ADMIN — TAZOBACTAM SODIUM AND PIPERACILLIN SODIUM SCH MLS/HR: 375; 3 INJECTION, SOLUTION INTRAVENOUS at 05:27

## 2018-01-11 RX ADMIN — MESALAMINE SCH MG: 500 CAPSULE ORAL at 17:03

## 2018-01-11 RX ADMIN — TAZOBACTAM SODIUM AND PIPERACILLIN SODIUM SCH MLS/HR: 375; 3 INJECTION, SOLUTION INTRAVENOUS at 11:42

## 2018-01-11 RX ADMIN — TAZOBACTAM SODIUM AND PIPERACILLIN SODIUM SCH MLS/HR: 375; 3 INJECTION, SOLUTION INTRAVENOUS at 00:28

## 2018-01-11 RX ADMIN — Medication SCH MG: at 20:46

## 2018-01-11 RX ADMIN — MESALAMINE SCH MG: 500 CAPSULE ORAL at 20:46

## 2018-01-11 RX ADMIN — METOPROLOL TARTRATE SCH MG: 25 TABLET, FILM COATED ORAL at 08:32

## 2018-01-11 RX ADMIN — METRONIDAZOLE SCH MLS/HR: 500 INJECTION, SOLUTION INTRAVENOUS at 00:27

## 2018-01-11 RX ADMIN — Medication SCH MG: at 08:32

## 2018-01-11 RX ADMIN — ENOXAPARIN SODIUM SCH MG: 60 INJECTION SUBCUTANEOUS at 20:46

## 2018-01-11 RX ADMIN — METRONIDAZOLE SCH MLS/HR: 500 INJECTION, SOLUTION INTRAVENOUS at 12:30

## 2018-01-11 RX ADMIN — MESALAMINE SCH MG: 500 CAPSULE ORAL at 08:32

## 2018-01-11 RX ADMIN — TEMAZEPAM SCH MG: 15 CAPSULE ORAL at 20:15

## 2018-01-11 RX ADMIN — MESALAMINE SCH MG: 500 CAPSULE ORAL at 12:31

## 2018-01-11 RX ADMIN — TAZOBACTAM SODIUM AND PIPERACILLIN SODIUM SCH MLS/HR: 375; 3 INJECTION, SOLUTION INTRAVENOUS at 17:55

## 2018-01-11 RX ADMIN — METOPROLOL TARTRATE SCH MG: 25 TABLET, FILM COATED ORAL at 16:38

## 2018-01-11 NOTE — PROGRESS NOTE
DATE:  January 11, 2018 



TIME OF SERVICE:  8:57 a.m. 



OVERNIGHT:  Feeling a little better.



REVIEW OF SYSTEMS:  Denies any dizziness.



VITAL SIGNS:  Reviewed.



PHYSICAL EXAMINATION

GENERAL APPEARANCE:  Tired-appearing woman resting in bed. 

HEENT:  Anicteric. 

CARDIOVASCULAR:  Normal S1, S2.

LUNGS:  She has moderate breath sounds.  

ABDOMEN:  Soft, nontender, nondistended.  She has right lower quadrant 

tenderness.

EXTREMITIES:  No edema.

SKIN:  Dry.

PSYCHIATRIC:  Flat affect. 



LABORATORIES:  Reviewed.



MEDICATION:  Reviewed. 



ASSESSMENT AND PLAN:  A 77-year-old woman:

1. Acute enteritis.

2. Urinary tract infection.

3. Gastrointestinal bleed.

4. Severe sepsis.

5. Atrial fibrillation.

6. Normocytic anemia.

7. Hypokalemia. 

8. Non-ST-elevation myocardial infarction.



Plan:  

1. Continue IV fluids, IV antibiotics.  She received vancomycin, Flagyl, 

Zosyn.  Continue IV steroids.

2. Follow up cultures.

3. Continue heart rate control.

4. Follow up blood count.

5. Replace electrolytes.

6. Hemoglobin today is 8.2.  Previously 7.9.

7. Leukocytosis has resolved.

8. Hypocalcemia.  Follow up labs.  Repeat electrolytes this morning.

9. Elevated troponin.  Troponin now trending down.  Will defer to 

cardiology.

10. Follow up P-ANCA and other serologic studies.

11. Small-bowel imaging shows distal ileus consistent with _______ 

_______ enteritis. 

12. Patient on Lovenox q.12 and IV PPI q.12.









DD:  01/11/2018 08:59

DT:  01/11/2018 09:43

Job#:  [id number]

## 2018-01-11 NOTE — CONSULTATION
DATE OF CONSULTATION:  January 10, 2018 



This is a 77 year old who has a history of chronic diarrhea with some 

questionable history of Crohn disease, who also has some abdominal pain 

along with some nausea, vomiting and diarrhea.  She denies any bleeding 

along with this problem.  Her workup so far revealed that her white count 

is a little bit high at 11.8.  The BUN was okay.  C. diff is still pending 

at this point.  She had a CT scan of the abdomen and pelvis, which showed 

nonspecific enteritis in the distal and terminal ileum with congestion and 

adenopathy.  Again, this finding was described in the report of the CT scan 

of the abdomen and pelvis on July 14, 2017.  She did have a colonoscopy on 

September 7, 2015, with biopsies of the small intestine which was negative 

except for focal active colitis at the time.  She also had small bowel 

x-rays that was done today, which shows possible enteritis that was seen 

before.  Her other medical problems are significant for history of chronic 

diarrhea as mentioned before, hypertension, history of atrial fibrillation, 

history of depression, history of C. diff colitis.



ALLERGIES:  NONE.



SOCIAL HISTORY:  Denies any alcohol use.



FAMILY HISTORY:  Noncontributory.



CURRENT MEDICATIONS:  Include Flagyl, Zosyn, Lovenox, and now on 

Solu-Cortef, iron, Protonix, lopressor.



REVIEW OF SYSTEMS:  Denies any chest pain.  No shortness of breath.  Denies 

any dysphagia or odynophagia.  Denies any dysuria or hematuria.  No 

syncopal episode.



PHYSICAL EXAMINATION 

GENERAL:  Awake, alert and appears to be stable.  No acute distress at this 

point.

VITAL SIGNS:  Afebrile currently.

HEENT:  Normocephalic.  Sclerae anicteric.

NECK:  Supple.

HEART:  Regular.

ABDOMEN:  Soft.  There is mild lower abdominal tenderness.  There is no 

rebound or mass.

EXTREMITIES:  No clubbing, cyanosis or edema.



LAB VALUES:  Significant for C. diff is pending.  WBC 11.19, hemoglobin 

9.7, hematocrit 27.9.  BUN of 10, creatinine 0.634, calcium of 6.8.  



IMPRESSION 

1.  Chronic diarrhea.

2.  Abdominal pain, nausea, vomiting, and diarrhea:  Possibly 

gastroenteritis.  However, the ileum shows possible enteritis.  I doubt 

that this is inflammatory bowel disease.  Patient has been having this 

chronic diarrhea for a while.  We have biopsies of the ileum, which was 

negative before.  Clostridium difficile is pending at this point.  

3.  History of atrial fibrillation.

4.  History of hypertension.



We are going to continue with antibiotics.  I am going to follow labs 

clinically.  



 





DD:  01/10/2018 20:24

DT:  01/11/2018 01:06

Job#:  [id number]



cc:MD FELECIA BIGGS MD

## 2018-01-12 VITALS — SYSTOLIC BLOOD PRESSURE: 155 MMHG | DIASTOLIC BLOOD PRESSURE: 99 MMHG

## 2018-01-12 VITALS — DIASTOLIC BLOOD PRESSURE: 87 MMHG | SYSTOLIC BLOOD PRESSURE: 144 MMHG

## 2018-01-12 VITALS — SYSTOLIC BLOOD PRESSURE: 154 MMHG | DIASTOLIC BLOOD PRESSURE: 96 MMHG

## 2018-01-12 VITALS — SYSTOLIC BLOOD PRESSURE: 172 MMHG | DIASTOLIC BLOOD PRESSURE: 102 MMHG

## 2018-01-12 VITALS — SYSTOLIC BLOOD PRESSURE: 137 MMHG | DIASTOLIC BLOOD PRESSURE: 68 MMHG

## 2018-01-12 VITALS — SYSTOLIC BLOOD PRESSURE: 152 MMHG | DIASTOLIC BLOOD PRESSURE: 91 MMHG

## 2018-01-12 LAB
BASOPHILS # BLD AUTO: 0 10*3/UL (ref 0–0.1)
BASOPHILS NFR BLD AUTO: 0.1 % (ref 0–1)
BASOPHILS NFR BLD AUTO: 0.1 % (ref 0–1)
BASOPHILS NFR BLD AUTO: 0.2 % (ref 0–1)
BASOPHILS NFR BLD AUTO: 0.2 % (ref 0–1)
DACRYOCYTES BLD QL SMEAR: (no result)
DEPRECATED NEUTROPHILS # BLD AUTO: 6.5 10*3/UL (ref 2.1–6.9)
DEPRECATED NEUTROPHILS # BLD AUTO: 7.4 10*3/UL (ref 2.1–6.9)
DEPRECATED NEUTROPHILS # BLD AUTO: 8.3 10*3/UL (ref 2.1–6.9)
DEPRECATED NEUTROPHILS # BLD AUTO: 8.5 10*3/UL (ref 2.1–6.9)
ELLIPTOCYTES BLD QL SMEAR: (no result)
EOSINOPHIL # BLD AUTO: 0 10*3/UL (ref 0–0.4)
EOSINOPHIL NFR BLD AUTO: 0 % (ref 0–6)
EOSINOPHIL NFR BLD AUTO: 0.1 % (ref 0–6)
ERYTHROCYTE [DISTWIDTH] IN CORD BLOOD: 17.6 % (ref 11.7–14.4)
ERYTHROCYTE [DISTWIDTH] IN CORD BLOOD: 17.7 % (ref 11.7–14.4)
ERYTHROCYTE [DISTWIDTH] IN CORD BLOOD: 17.8 % (ref 11.7–14.4)
ERYTHROCYTE [DISTWIDTH] IN CORD BLOOD: 17.8 % (ref 11.7–14.4)
HCT VFR BLD AUTO: 27.3 % (ref 34.2–44.1)
HCT VFR BLD AUTO: 28.5 % (ref 34.2–44.1)
HCT VFR BLD AUTO: 28.8 % (ref 34.2–44.1)
HCT VFR BLD AUTO: 30.1 % (ref 34.2–44.1)
HGB BLD-MCNC: 8.5 G/DL (ref 12–16)
HGB BLD-MCNC: 8.9 G/DL (ref 12–16)
HGB BLD-MCNC: 8.9 G/DL (ref 12–16)
HGB BLD-MCNC: 9.5 G/DL (ref 12–16)
LYMPHOCYTES # BLD: 0.8 10*3/UL (ref 1–3.2)
LYMPHOCYTES # BLD: 0.8 10*3/UL (ref 1–3.2)
LYMPHOCYTES # BLD: 1 10*3/UL (ref 1–3.2)
LYMPHOCYTES # BLD: 1.1 10*3/UL (ref 1–3.2)
LYMPHOCYTES NFR BLD AUTO: 10.5 % (ref 18–39.1)
LYMPHOCYTES NFR BLD AUTO: 13.4 % (ref 18–39.1)
LYMPHOCYTES NFR BLD AUTO: 7.6 % (ref 18–39.1)
LYMPHOCYTES NFR BLD AUTO: 9.4 % (ref 18–39.1)
LYMPHOCYTES NFR BLD MANUAL: 7 % (ref 19–48)
MCH RBC QN AUTO: 26.7 PG (ref 28–32)
MCH RBC QN AUTO: 26.8 PG (ref 28–32)
MCHC RBC AUTO-ENTMCNC: 30.9 G/DL (ref 31–35)
MCHC RBC AUTO-ENTMCNC: 31.1 G/DL (ref 31–35)
MCHC RBC AUTO-ENTMCNC: 31.2 G/DL (ref 31–35)
MCHC RBC AUTO-ENTMCNC: 31.6 G/DL (ref 31–35)
MCV RBC AUTO: 85 FL (ref 81–99)
MCV RBC AUTO: 85.6 FL (ref 81–99)
MCV RBC AUTO: 85.8 FL (ref 81–99)
MCV RBC AUTO: 86.5 FL (ref 81–99)
MONOCYTES # BLD AUTO: 0.3 10*3/UL (ref 0.2–0.8)
MONOCYTES # BLD AUTO: 0.4 10*3/UL (ref 0.2–0.8)
MONOCYTES # BLD AUTO: 0.5 10*3/UL (ref 0.2–0.8)
MONOCYTES # BLD AUTO: 0.5 10*3/UL (ref 0.2–0.8)
MONOCYTES NFR BLD AUTO: 3.5 % (ref 4.4–11.3)
MONOCYTES NFR BLD AUTO: 3.7 % (ref 4.4–11.3)
MONOCYTES NFR BLD AUTO: 4.9 % (ref 4.4–11.3)
MONOCYTES NFR BLD AUTO: 6.2 % (ref 4.4–11.3)
MONOCYTES NFR BLD MANUAL: 4 % (ref 3.4–9)
NEUTS SEG NFR BLD AUTO: 79.6 % (ref 38.7–80)
NEUTS SEG NFR BLD AUTO: 84 % (ref 38.7–80)
NEUTS SEG NFR BLD AUTO: 86.1 % (ref 38.7–80)
NEUTS SEG NFR BLD AUTO: 86.4 % (ref 38.7–80)
NEUTS SEG NFR BLD MANUAL: 89 % (ref 40–74)
PLAT MORPH BLD: NORMAL
PLATELET # BLD AUTO: 265 X10E3/UL (ref 140–360)
PLATELET # BLD AUTO: 288 X10E3/UL (ref 140–360)
PLATELET # BLD AUTO: 303 X10E3/UL (ref 140–360)
PLATELET # BLD AUTO: 320 X10E3/UL (ref 140–360)
PLATELET # BLD EST: ADEQUATE 10*3/UL
POIKILOCYTOSIS BLD QL SMEAR: (no result)
RBC # BLD AUTO: 3.18 X10E6/UL (ref 3.6–5.1)
RBC # BLD AUTO: 3.33 X10E6/UL (ref 3.6–5.1)
RBC # BLD AUTO: 3.33 X10E6/UL (ref 3.6–5.1)
RBC # BLD AUTO: 3.54 X10E6/UL (ref 3.6–5.1)
RBC MORPH BLD: (no result)

## 2018-01-12 RX ADMIN — TAZOBACTAM SODIUM AND PIPERACILLIN SODIUM SCH MLS/HR: 375; 3 INJECTION, SOLUTION INTRAVENOUS at 05:22

## 2018-01-12 RX ADMIN — METOPROLOL TARTRATE SCH MG: 25 TABLET, FILM COATED ORAL at 17:59

## 2018-01-12 RX ADMIN — Medication SCH MG: at 05:22

## 2018-01-12 RX ADMIN — METRONIDAZOLE SCH MLS/HR: 500 INJECTION, SOLUTION INTRAVENOUS at 13:00

## 2018-01-12 RX ADMIN — SODIUM CHLORIDE SCH MG: 900 INJECTION INTRAVENOUS at 21:00

## 2018-01-12 RX ADMIN — SODIUM CHLORIDE SCH MG: 900 INJECTION INTRAVENOUS at 09:28

## 2018-01-12 RX ADMIN — METRONIDAZOLE SCH MLS/HR: 500 INJECTION, SOLUTION INTRAVENOUS at 18:35

## 2018-01-12 RX ADMIN — Medication SCH MG: at 21:55

## 2018-01-12 RX ADMIN — Medication SCH MG: at 09:28

## 2018-01-12 RX ADMIN — MESALAMINE SCH MG: 500 CAPSULE ORAL at 09:00

## 2018-01-12 RX ADMIN — TAZOBACTAM SODIUM AND PIPERACILLIN SODIUM SCH MLS/HR: 375; 3 INJECTION, SOLUTION INTRAVENOUS at 00:22

## 2018-01-12 RX ADMIN — MESALAMINE SCH MG: 500 CAPSULE ORAL at 13:00

## 2018-01-12 RX ADMIN — ENOXAPARIN SODIUM SCH MG: 60 INJECTION SUBCUTANEOUS at 21:00

## 2018-01-12 RX ADMIN — TEMAZEPAM SCH MG: 15 CAPSULE ORAL at 21:00

## 2018-01-12 RX ADMIN — MESALAMINE SCH MG: 500 CAPSULE ORAL at 18:00

## 2018-01-12 RX ADMIN — ENOXAPARIN SODIUM SCH MG: 60 INJECTION SUBCUTANEOUS at 09:28

## 2018-01-12 RX ADMIN — Medication SCH MG: at 15:46

## 2018-01-12 RX ADMIN — TAZOBACTAM SODIUM AND PIPERACILLIN SODIUM SCH MLS/HR: 375; 3 INJECTION, SOLUTION INTRAVENOUS at 12:25

## 2018-01-12 RX ADMIN — METRONIDAZOLE SCH MLS/HR: 500 INJECTION, SOLUTION INTRAVENOUS at 06:29

## 2018-01-12 RX ADMIN — METOPROLOL TARTRATE SCH MG: 25 TABLET, FILM COATED ORAL at 09:25

## 2018-01-12 RX ADMIN — METRONIDAZOLE SCH MLS/HR: 500 INJECTION, SOLUTION INTRAVENOUS at 01:00

## 2018-01-12 RX ADMIN — MESALAMINE SCH MG: 500 CAPSULE ORAL at 21:00

## 2018-01-12 RX ADMIN — TAZOBACTAM SODIUM AND PIPERACILLIN SODIUM SCH MLS/HR: 375; 3 INJECTION, SOLUTION INTRAVENOUS at 17:59

## 2018-01-12 NOTE — PROGRESS NOTE
DATE:  January 12, 2018 



TIME:  8 a.m.



OVERNIGHT:  Patient wants "real food".  



REVIEW OF SYSTEMS:  Denies any dizziness.  



PHYSICAL EXAMINATION 

VITAL SIGNS:  Reviewed.

GENERAL:  A tired-appearing woman resting in bed. 

HEENT:  Anicteric.  

CARDIOVASCULAR:  Normal S1 and S2.  

LUNGS:  Moderate breath sounds.  

ABDOMEN:  Soft and nondistended.  Right lower quadrant mild tenderness.  

EXTREMITIES:  No edema.

SKIN:  Dry.

PSYCHIATRIC:  Flat affect.



LABS:  Reviewed.



MEDICATIONS:  Reviewed.



ASSESSMENT:  A 77-year-old woman with:

1.  Acute enteritis.

2.  Urinary tract infection. 

3.  Gastrointestinal bleed.

4.  Severe sepsis.

5.  Atrial fibrillation.

6.  Normocytic anemia.

7.  Hypokalemia.

8.  Non-ST-segment elevation myocardial infarction. 



PLAN 

1.  Continue IV antibiotics.

2.  Patient on clear liquid diet now.

3.  Heart rate control.

4.  Follow up hemoglobin.

5.  Follow BMP and other serologic studies.  

6.  Continue IV steroids.

7.  Patient is on Lovenox q.12 h. in the setting of NSTEMI, non-ST-segment 

elevation myocardial infarction.  Also, the patient is on beta blocker.

8.  Patient is on mesalamine.

9.  Physical therapy.

10. Discharge planning.  









DD:  01/12/2018 08:10

DT:  01/12/2018 08:47

Job#:  [id number]

## 2018-01-13 VITALS — SYSTOLIC BLOOD PRESSURE: 155 MMHG | DIASTOLIC BLOOD PRESSURE: 82 MMHG

## 2018-01-13 VITALS — DIASTOLIC BLOOD PRESSURE: 83 MMHG | SYSTOLIC BLOOD PRESSURE: 136 MMHG

## 2018-01-13 VITALS — DIASTOLIC BLOOD PRESSURE: 85 MMHG | SYSTOLIC BLOOD PRESSURE: 158 MMHG

## 2018-01-13 VITALS — SYSTOLIC BLOOD PRESSURE: 152 MMHG | DIASTOLIC BLOOD PRESSURE: 66 MMHG

## 2018-01-13 VITALS — DIASTOLIC BLOOD PRESSURE: 84 MMHG | SYSTOLIC BLOOD PRESSURE: 154 MMHG

## 2018-01-13 VITALS — DIASTOLIC BLOOD PRESSURE: 93 MMHG | SYSTOLIC BLOOD PRESSURE: 157 MMHG

## 2018-01-13 VITALS — DIASTOLIC BLOOD PRESSURE: 96 MMHG | SYSTOLIC BLOOD PRESSURE: 164 MMHG

## 2018-01-13 LAB
BASOPHILS # BLD AUTO: 0 10*3/UL (ref 0–0.1)
BASOPHILS NFR BLD AUTO: 0.1 % (ref 0–1)
BASOPHILS NFR BLD AUTO: 0.2 % (ref 0–1)
BASOPHILS NFR BLD AUTO: 0.2 % (ref 0–1)
BASOPHILS NFR BLD AUTO: 0.3 % (ref 0–1)
DEPRECATED NEUTROPHILS # BLD AUTO: 6.1 10*3/UL (ref 2.1–6.9)
DEPRECATED NEUTROPHILS # BLD AUTO: 7.1 10*3/UL (ref 2.1–6.9)
DEPRECATED NEUTROPHILS # BLD AUTO: 7.9 10*3/UL (ref 2.1–6.9)
DEPRECATED NEUTROPHILS # BLD AUTO: 9.9 10*3/UL (ref 2.1–6.9)
EOSINOPHIL # BLD AUTO: 0 10*3/UL (ref 0–0.4)
EOSINOPHIL NFR BLD AUTO: 0 % (ref 0–6)
EOSINOPHIL NFR BLD AUTO: 0.1 % (ref 0–6)
ERYTHROCYTE [DISTWIDTH] IN CORD BLOOD: 17.9 % (ref 11.7–14.4)
ERYTHROCYTE [DISTWIDTH] IN CORD BLOOD: 18 % (ref 11.7–14.4)
ERYTHROCYTE [DISTWIDTH] IN CORD BLOOD: 18 % (ref 11.7–14.4)
ERYTHROCYTE [DISTWIDTH] IN CORD BLOOD: 18.1 % (ref 11.7–14.4)
HCT VFR BLD AUTO: 27.9 % (ref 34.2–44.1)
HCT VFR BLD AUTO: 28.2 % (ref 34.2–44.1)
HCT VFR BLD AUTO: 28.7 % (ref 34.2–44.1)
HCT VFR BLD AUTO: 31.4 % (ref 34.2–44.1)
HGB BLD-MCNC: 8.7 G/DL (ref 12–16)
HGB BLD-MCNC: 8.7 G/DL (ref 12–16)
HGB BLD-MCNC: 8.9 G/DL (ref 12–16)
HGB BLD-MCNC: 9.6 G/DL (ref 12–16)
LYMPHOCYTES # BLD: 0.7 10*3/UL (ref 1–3.2)
LYMPHOCYTES # BLD: 0.9 10*3/UL (ref 1–3.2)
LYMPHOCYTES # BLD: 1 10*3/UL (ref 1–3.2)
LYMPHOCYTES # BLD: 1 10*3/UL (ref 1–3.2)
LYMPHOCYTES NFR BLD AUTO: 10.2 % (ref 18–39.1)
LYMPHOCYTES NFR BLD AUTO: 13.2 % (ref 18–39.1)
LYMPHOCYTES NFR BLD AUTO: 7.8 % (ref 18–39.1)
LYMPHOCYTES NFR BLD AUTO: 8.5 % (ref 18–39.1)
MCH RBC QN AUTO: 26.4 PG (ref 28–32)
MCH RBC QN AUTO: 26.6 PG (ref 28–32)
MCH RBC QN AUTO: 26.7 PG (ref 28–32)
MCH RBC QN AUTO: 26.9 PG (ref 28–32)
MCHC RBC AUTO-ENTMCNC: 30.6 G/DL (ref 31–35)
MCHC RBC AUTO-ENTMCNC: 30.9 G/DL (ref 31–35)
MCHC RBC AUTO-ENTMCNC: 31 G/DL (ref 31–35)
MCHC RBC AUTO-ENTMCNC: 31.2 G/DL (ref 31–35)
MCV RBC AUTO: 86.1 FL (ref 81–99)
MCV RBC AUTO: 86.2 FL (ref 81–99)
MCV RBC AUTO: 86.2 FL (ref 81–99)
MCV RBC AUTO: 86.5 FL (ref 81–99)
MONOCYTES # BLD AUTO: 0.3 10*3/UL (ref 0.2–0.8)
MONOCYTES # BLD AUTO: 0.4 10*3/UL (ref 0.2–0.8)
MONOCYTES # BLD AUTO: 0.7 10*3/UL (ref 0.2–0.8)
MONOCYTES # BLD AUTO: 0.7 10*3/UL (ref 0.2–0.8)
MONOCYTES NFR BLD AUTO: 3.4 % (ref 4.4–11.3)
MONOCYTES NFR BLD AUTO: 5.5 % (ref 4.4–11.3)
MONOCYTES NFR BLD AUTO: 6.2 % (ref 4.4–11.3)
MONOCYTES NFR BLD AUTO: 7.5 % (ref 4.4–11.3)
NEUTS SEG NFR BLD AUTO: 80.2 % (ref 38.7–80)
NEUTS SEG NFR BLD AUTO: 80.3 % (ref 38.7–80)
NEUTS SEG NFR BLD AUTO: 84 % (ref 38.7–80)
NEUTS SEG NFR BLD AUTO: 86.8 % (ref 38.7–80)
PLATELET # BLD AUTO: 286 X10E3/UL (ref 140–360)
PLATELET # BLD AUTO: 287 X10E3/UL (ref 140–360)
PLATELET # BLD AUTO: 295 X10E3/UL (ref 140–360)
PLATELET # BLD AUTO: 319 X10E3/UL (ref 140–360)
RBC # BLD AUTO: 3.24 X10E6/UL (ref 3.6–5.1)
RBC # BLD AUTO: 3.27 X10E6/UL (ref 3.6–5.1)
RBC # BLD AUTO: 3.33 X10E6/UL (ref 3.6–5.1)
RBC # BLD AUTO: 3.63 X10E6/UL (ref 3.6–5.1)

## 2018-01-13 RX ADMIN — ENOXAPARIN SODIUM SCH MG: 60 INJECTION SUBCUTANEOUS at 21:50

## 2018-01-13 RX ADMIN — METOPROLOL TARTRATE SCH MG: 25 TABLET, FILM COATED ORAL at 09:24

## 2018-01-13 RX ADMIN — TAZOBACTAM SODIUM AND PIPERACILLIN SODIUM SCH MLS/HR: 375; 3 INJECTION, SOLUTION INTRAVENOUS at 00:00

## 2018-01-13 RX ADMIN — SODIUM CHLORIDE SCH MG: 900 INJECTION INTRAVENOUS at 21:50

## 2018-01-13 RX ADMIN — SODIUM CHLORIDE SCH MG: 900 INJECTION INTRAVENOUS at 09:24

## 2018-01-13 RX ADMIN — TAZOBACTAM SODIUM AND PIPERACILLIN SODIUM SCH MLS/HR: 375; 3 INJECTION, SOLUTION INTRAVENOUS at 12:21

## 2018-01-13 RX ADMIN — TAZOBACTAM SODIUM AND PIPERACILLIN SODIUM SCH MLS/HR: 375; 3 INJECTION, SOLUTION INTRAVENOUS at 18:08

## 2018-01-13 RX ADMIN — TEMAZEPAM SCH MG: 15 CAPSULE ORAL at 22:38

## 2018-01-13 RX ADMIN — METOPROLOL TARTRATE SCH MG: 25 TABLET, FILM COATED ORAL at 18:08

## 2018-01-13 RX ADMIN — Medication SCH MG: at 14:41

## 2018-01-13 RX ADMIN — METRONIDAZOLE SCH MLS/HR: 500 INJECTION, SOLUTION INTRAVENOUS at 05:15

## 2018-01-13 RX ADMIN — Medication SCH MG: at 09:24

## 2018-01-13 RX ADMIN — Medication SCH MG: at 21:50

## 2018-01-13 RX ADMIN — METRONIDAZOLE SCH MLS/HR: 500 INJECTION, SOLUTION INTRAVENOUS at 16:00

## 2018-01-13 RX ADMIN — MESALAMINE SCH MG: 500 CAPSULE ORAL at 13:00

## 2018-01-13 RX ADMIN — METRONIDAZOLE SCH MLS/HR: 500 INJECTION, SOLUTION INTRAVENOUS at 12:54

## 2018-01-13 RX ADMIN — MESALAMINE SCH MG: 500 CAPSULE ORAL at 09:24

## 2018-01-13 RX ADMIN — Medication SCH MG: at 05:15

## 2018-01-13 RX ADMIN — METRONIDAZOLE SCH MLS/HR: 500 INJECTION, SOLUTION INTRAVENOUS at 00:00

## 2018-01-13 RX ADMIN — TAZOBACTAM SODIUM AND PIPERACILLIN SODIUM SCH MLS/HR: 375; 3 INJECTION, SOLUTION INTRAVENOUS at 05:55

## 2018-01-13 RX ADMIN — MESALAMINE SCH MG: 500 CAPSULE ORAL at 18:00

## 2018-01-13 RX ADMIN — ENOXAPARIN SODIUM SCH MG: 60 INJECTION SUBCUTANEOUS at 09:24

## 2018-01-13 RX ADMIN — MESALAMINE SCH MG: 500 CAPSULE ORAL at 21:00

## 2018-01-14 VITALS — SYSTOLIC BLOOD PRESSURE: 173 MMHG | DIASTOLIC BLOOD PRESSURE: 99 MMHG

## 2018-01-14 VITALS — DIASTOLIC BLOOD PRESSURE: 9 MMHG | SYSTOLIC BLOOD PRESSURE: 156 MMHG

## 2018-01-14 VITALS — SYSTOLIC BLOOD PRESSURE: 145 MMHG | DIASTOLIC BLOOD PRESSURE: 83 MMHG

## 2018-01-14 VITALS — SYSTOLIC BLOOD PRESSURE: 162 MMHG | DIASTOLIC BLOOD PRESSURE: 99 MMHG

## 2018-01-14 VITALS — DIASTOLIC BLOOD PRESSURE: 99 MMHG | SYSTOLIC BLOOD PRESSURE: 175 MMHG

## 2018-01-14 VITALS — DIASTOLIC BLOOD PRESSURE: 79 MMHG | SYSTOLIC BLOOD PRESSURE: 146 MMHG

## 2018-01-14 RX ADMIN — Medication SCH MG: at 21:02

## 2018-01-14 RX ADMIN — METOPROLOL TARTRATE SCH MG: 25 TABLET, FILM COATED ORAL at 08:45

## 2018-01-14 RX ADMIN — ENOXAPARIN SODIUM SCH MG: 60 INJECTION SUBCUTANEOUS at 21:02

## 2018-01-14 RX ADMIN — METRONIDAZOLE SCH MLS/HR: 500 INJECTION, SOLUTION INTRAVENOUS at 18:08

## 2018-01-14 RX ADMIN — Medication SCH MG: at 12:27

## 2018-01-14 RX ADMIN — MESALAMINE SCH MG: 500 CAPSULE ORAL at 21:00

## 2018-01-14 RX ADMIN — TAZOBACTAM SODIUM AND PIPERACILLIN SODIUM SCH MLS/HR: 375; 3 INJECTION, SOLUTION INTRAVENOUS at 05:07

## 2018-01-14 RX ADMIN — Medication SCH MG: at 14:06

## 2018-01-14 RX ADMIN — TAZOBACTAM SODIUM AND PIPERACILLIN SODIUM SCH MLS/HR: 375; 3 INJECTION, SOLUTION INTRAVENOUS at 17:34

## 2018-01-14 RX ADMIN — MESALAMINE SCH MG: 500 CAPSULE ORAL at 09:00

## 2018-01-14 RX ADMIN — METRONIDAZOLE SCH MLS/HR: 500 INJECTION, SOLUTION INTRAVENOUS at 00:20

## 2018-01-14 RX ADMIN — Medication SCH MG: at 08:45

## 2018-01-14 RX ADMIN — TAZOBACTAM SODIUM AND PIPERACILLIN SODIUM SCH MLS/HR: 375; 3 INJECTION, SOLUTION INTRAVENOUS at 00:20

## 2018-01-14 RX ADMIN — SODIUM CHLORIDE SCH MG: 900 INJECTION INTRAVENOUS at 08:45

## 2018-01-14 RX ADMIN — METOPROLOL TARTRATE SCH MG: 50 TABLET, FILM COATED ORAL at 21:01

## 2018-01-14 RX ADMIN — METRONIDAZOLE SCH MLS/HR: 500 INJECTION, SOLUTION INTRAVENOUS at 00:21

## 2018-01-14 RX ADMIN — MESALAMINE SCH MG: 500 CAPSULE ORAL at 21:02

## 2018-01-14 RX ADMIN — Medication SCH MG: at 05:07

## 2018-01-14 RX ADMIN — ENOXAPARIN SODIUM SCH MG: 60 INJECTION SUBCUTANEOUS at 08:45

## 2018-01-14 RX ADMIN — MESALAMINE SCH MG: 500 CAPSULE ORAL at 13:00

## 2018-01-14 RX ADMIN — Medication SCH MG: at 22:00

## 2018-01-14 RX ADMIN — SODIUM CHLORIDE SCH MG: 900 INJECTION INTRAVENOUS at 21:01

## 2018-01-14 RX ADMIN — TAZOBACTAM SODIUM AND PIPERACILLIN SODIUM SCH MLS/HR: 375; 3 INJECTION, SOLUTION INTRAVENOUS at 12:27

## 2018-01-14 RX ADMIN — MESALAMINE SCH MG: 500 CAPSULE ORAL at 17:45

## 2018-01-14 RX ADMIN — TEMAZEPAM SCH MG: 15 CAPSULE ORAL at 21:02

## 2018-01-14 RX ADMIN — METRONIDAZOLE SCH MLS/HR: 500 INJECTION, SOLUTION INTRAVENOUS at 13:00

## 2018-01-14 RX ADMIN — METRONIDAZOLE SCH MLS/HR: 500 INJECTION, SOLUTION INTRAVENOUS at 05:32

## 2018-01-14 NOTE — PROGRESS NOTE
DATE:  January 14, 2018 



TIME:  7:55 a.m.



OVERNIGHT:  No events.



REVIEW OF SYSTEMS:  Denies any dizziness.  



VITAL SIGNS:  Reviewed.



PHYSICAL EXAMINATION 

GENERAL:  A tired-appearing woman resting in bed. 

HEENT:  Anicteric.  

CARDIOVASCULAR:  Normal S1 and S2.  

LUNGS:  Moderate breath sounds.  

ABDOMEN:  Soft and nontender.

EXTREMITIES:  No edema.

SKIN:  Dry.

PSYCHIATRIC:  Flat affect.



LABS:  Reviewed.



MEDICATIONS:  Reviewed.



ASSESSMENT:  A 77-year-old woman.

1. Acute enteritis.

2. Urinary tract infection. 

3. Gastrointestinal bleed.

4. Severe sepsis.

5. Atrial fibrillation.

6. Normocytic anemia.

7. Hypokalemia.

8. Non-ST-segment elevation myocardial infarction. 

9. Escherichia coli urinary tract infection.



PLAN 

1. Continue IV antibiotics.

2. Continue liquid diet. 

3. Continue IV steroids.

4. Continue mesalamine. 

5. Patient is on Lovenox treatment dose b.i.d. and beta blocker.

6. E. coli urinary tract infection.  Patient is on IV Zosyn.

7. Continue physical therapy.

8. Discharge planning.  







DD:  01/14/2018 07:57

DT:  01/14/2018 09:27

Job#:  [id number]

## 2018-01-15 VITALS — DIASTOLIC BLOOD PRESSURE: 75 MMHG | SYSTOLIC BLOOD PRESSURE: 125 MMHG

## 2018-01-15 VITALS — SYSTOLIC BLOOD PRESSURE: 132 MMHG | DIASTOLIC BLOOD PRESSURE: 69 MMHG

## 2018-01-15 VITALS — DIASTOLIC BLOOD PRESSURE: 76 MMHG | SYSTOLIC BLOOD PRESSURE: 148 MMHG

## 2018-01-15 VITALS — SYSTOLIC BLOOD PRESSURE: 142 MMHG | DIASTOLIC BLOOD PRESSURE: 65 MMHG

## 2018-01-15 VITALS — DIASTOLIC BLOOD PRESSURE: 63 MMHG | SYSTOLIC BLOOD PRESSURE: 136 MMHG

## 2018-01-15 VITALS — SYSTOLIC BLOOD PRESSURE: 123 MMHG | DIASTOLIC BLOOD PRESSURE: 60 MMHG

## 2018-01-15 LAB
ANION GAP SERPL CALC-SCNC: 14 MMOL/L (ref 8–16)
ANION GAP SERPL CALC-SCNC: 17 MMOL/L (ref 8–16)
BASOPHILS # BLD AUTO: 0 10*3/UL (ref 0–0.1)
BASOPHILS NFR BLD AUTO: 0.3 % (ref 0–1)
BUN SERPL-MCNC: 7 MG/DL (ref 7–26)
BUN SERPL-MCNC: 7 MG/DL (ref 7–26)
BUN/CREAT SERPL: 11 (ref 6–25)
BUN/CREAT SERPL: 11 (ref 6–25)
CALCIUM SERPL-MCNC: 7.4 MG/DL (ref 8.4–10.2)
CALCIUM SERPL-MCNC: 7.6 MG/DL (ref 8.4–10.2)
CHLORIDE SERPL-SCNC: 96 MMOL/L (ref 98–107)
CHLORIDE SERPL-SCNC: 98 MMOL/L (ref 98–107)
CO2 SERPL-SCNC: 28 MMOL/L (ref 22–29)
CO2 SERPL-SCNC: 32 MMOL/L (ref 22–29)
DEPRECATED NEUTROPHILS # BLD AUTO: 8.8 10*3/UL (ref 2.1–6.9)
EGFRCR SERPLBLD CKD-EPI 2021: > 60 ML/MIN (ref 60–?)
EGFRCR SERPLBLD CKD-EPI 2021: > 60 ML/MIN (ref 60–?)
EOSINOPHIL # BLD AUTO: 0 10*3/UL (ref 0–0.4)
EOSINOPHIL NFR BLD AUTO: 0 % (ref 0–6)
ERYTHROCYTE [DISTWIDTH] IN CORD BLOOD: 17.9 % (ref 11.7–14.4)
GLUCOSE SERPLBLD-MCNC: 170 MG/DL (ref 74–118)
GLUCOSE SERPLBLD-MCNC: 282 MG/DL (ref 74–118)
HCT VFR BLD AUTO: 31.7 % (ref 34.2–44.1)
HGB BLD-MCNC: 10.2 G/DL (ref 12–16)
LYMPHOCYTES # BLD: 1.6 10*3/UL (ref 1–3.2)
LYMPHOCYTES NFR BLD AUTO: 13.8 % (ref 18–39.1)
MCH RBC QN AUTO: 27 PG (ref 28–32)
MCHC RBC AUTO-ENTMCNC: 32.2 G/DL (ref 31–35)
MCV RBC AUTO: 83.9 FL (ref 81–99)
MONOCYTES # BLD AUTO: 0.9 10*3/UL (ref 0.2–0.8)
MONOCYTES NFR BLD AUTO: 7.6 % (ref 4.4–11.3)
NEUTS SEG NFR BLD AUTO: 76.7 % (ref 38.7–80)
PLATELET # BLD AUTO: 360 X10E3/UL (ref 140–360)
POTASSIUM SERPL-SCNC: 2 MMOL/L (ref 3.5–5.1)
POTASSIUM SERPL-SCNC: 2 MMOL/L (ref 3.5–5.1)
RBC # BLD AUTO: 3.78 X10E6/UL (ref 3.6–5.1)
SODIUM SERPL-SCNC: 140 MMOL/L (ref 136–145)
SODIUM SERPL-SCNC: 141 MMOL/L (ref 136–145)

## 2018-01-15 RX ADMIN — Medication SCH MG: at 08:51

## 2018-01-15 RX ADMIN — SODIUM CHLORIDE SCH MG: 900 INJECTION INTRAVENOUS at 08:51

## 2018-01-15 RX ADMIN — METOPROLOL TARTRATE SCH MG: 50 TABLET, FILM COATED ORAL at 08:52

## 2018-01-15 RX ADMIN — METRONIDAZOLE SCH MLS/HR: 500 INJECTION, SOLUTION INTRAVENOUS at 00:31

## 2018-01-15 RX ADMIN — TAZOBACTAM SODIUM AND PIPERACILLIN SODIUM SCH MLS/HR: 375; 3 INJECTION, SOLUTION INTRAVENOUS at 12:45

## 2018-01-15 RX ADMIN — METRONIDAZOLE SCH MLS/HR: 500 INJECTION, SOLUTION INTRAVENOUS at 17:29

## 2018-01-15 RX ADMIN — MESALAMINE SCH MG: 500 CAPSULE ORAL at 12:45

## 2018-01-15 RX ADMIN — Medication SCH MG: at 21:07

## 2018-01-15 RX ADMIN — SODIUM CHLORIDE SCH MG: 900 INJECTION INTRAVENOUS at 21:05

## 2018-01-15 RX ADMIN — TAZOBACTAM SODIUM AND PIPERACILLIN SODIUM SCH MLS/HR: 375; 3 INJECTION, SOLUTION INTRAVENOUS at 18:30

## 2018-01-15 RX ADMIN — MESALAMINE SCH MG: 400 TABLET, DELAYED RELEASE ORAL at 21:05

## 2018-01-15 RX ADMIN — ALPRAZOLAM SCH MG: 0.25 TABLET ORAL at 21:07

## 2018-01-15 RX ADMIN — ALPRAZOLAM SCH MG: 0.25 TABLET ORAL at 11:05

## 2018-01-15 RX ADMIN — METRONIDAZOLE SCH MLS/HR: 500 INJECTION, SOLUTION INTRAVENOUS at 13:30

## 2018-01-15 RX ADMIN — ENOXAPARIN SODIUM SCH MG: 60 INJECTION SUBCUTANEOUS at 21:06

## 2018-01-15 RX ADMIN — Medication SCH MG: at 06:00

## 2018-01-15 RX ADMIN — TAZOBACTAM SODIUM AND PIPERACILLIN SODIUM SCH MLS/HR: 375; 3 INJECTION, SOLUTION INTRAVENOUS at 00:00

## 2018-01-15 RX ADMIN — TAZOBACTAM SODIUM AND PIPERACILLIN SODIUM SCH MLS/HR: 375; 3 INJECTION, SOLUTION INTRAVENOUS at 06:00

## 2018-01-15 RX ADMIN — ENOXAPARIN SODIUM SCH MG: 60 INJECTION SUBCUTANEOUS at 08:51

## 2018-01-15 RX ADMIN — METOPROLOL TARTRATE SCH MG: 50 TABLET, FILM COATED ORAL at 21:05

## 2018-01-15 RX ADMIN — TEMAZEPAM SCH MG: 15 CAPSULE ORAL at 22:56

## 2018-01-15 RX ADMIN — MESALAMINE SCH MG: 500 CAPSULE ORAL at 08:51

## 2018-01-15 RX ADMIN — ALPRAZOLAM SCH MG: 0.25 TABLET ORAL at 14:00

## 2018-01-15 RX ADMIN — MESALAMINE SCH MG: 500 CAPSULE ORAL at 17:57

## 2018-01-15 RX ADMIN — METRONIDAZOLE SCH MLS/HR: 500 INJECTION, SOLUTION INTRAVENOUS at 06:35

## 2018-01-15 RX ADMIN — TAZOBACTAM SODIUM AND PIPERACILLIN SODIUM SCH MLS/HR: 375; 3 INJECTION, SOLUTION INTRAVENOUS at 23:48

## 2018-01-15 RX ADMIN — Medication SCH MG: at 14:30

## 2018-01-15 RX ADMIN — Medication SCH MG: at 21:05

## 2018-01-16 VITALS — DIASTOLIC BLOOD PRESSURE: 70 MMHG | SYSTOLIC BLOOD PRESSURE: 148 MMHG

## 2018-01-16 VITALS — SYSTOLIC BLOOD PRESSURE: 170 MMHG | DIASTOLIC BLOOD PRESSURE: 18 MMHG

## 2018-01-16 VITALS — DIASTOLIC BLOOD PRESSURE: 69 MMHG | SYSTOLIC BLOOD PRESSURE: 126 MMHG

## 2018-01-16 VITALS — DIASTOLIC BLOOD PRESSURE: 71 MMHG | SYSTOLIC BLOOD PRESSURE: 134 MMHG

## 2018-01-16 VITALS — SYSTOLIC BLOOD PRESSURE: 148 MMHG | DIASTOLIC BLOOD PRESSURE: 70 MMHG

## 2018-01-16 VITALS — SYSTOLIC BLOOD PRESSURE: 125 MMHG | DIASTOLIC BLOOD PRESSURE: 69 MMHG

## 2018-01-16 VITALS — DIASTOLIC BLOOD PRESSURE: 79 MMHG | SYSTOLIC BLOOD PRESSURE: 163 MMHG

## 2018-01-16 LAB
ANION GAP SERPL CALC-SCNC: 12.8 MMOL/L (ref 8–16)
BASOPHILS # BLD AUTO: 0 10*3/UL (ref 0–0.1)
BASOPHILS NFR BLD AUTO: 0.2 % (ref 0–1)
BUN SERPL-MCNC: 8 MG/DL (ref 7–26)
BUN/CREAT SERPL: 14 (ref 6–25)
CALCIUM SERPL-MCNC: 7 MG/DL (ref 8.4–10.2)
CHLORIDE SERPL-SCNC: 100 MMOL/L (ref 98–107)
CO2 SERPL-SCNC: 31 MMOL/L (ref 22–29)
DEPRECATED NEUTROPHILS # BLD AUTO: 6.9 10*3/UL (ref 2.1–6.9)
EGFRCR SERPLBLD CKD-EPI 2021: > 60 ML/MIN (ref 60–?)
EOSINOPHIL # BLD AUTO: 0 10*3/UL (ref 0–0.4)
EOSINOPHIL NFR BLD AUTO: 0.3 % (ref 0–6)
ERYTHROCYTE [DISTWIDTH] IN CORD BLOOD: 18.5 % (ref 11.7–14.4)
GLUCOSE SERPLBLD-MCNC: 139 MG/DL (ref 74–118)
HCT VFR BLD AUTO: 28.3 % (ref 34.2–44.1)
HGB BLD-MCNC: 8.9 G/DL (ref 12–16)
LYMPHOCYTES # BLD: 1.4 10*3/UL (ref 1–3.2)
LYMPHOCYTES NFR BLD AUTO: 14.8 % (ref 18–39.1)
MCH RBC QN AUTO: 26.9 PG (ref 28–32)
MCHC RBC AUTO-ENTMCNC: 31.4 G/DL (ref 31–35)
MCV RBC AUTO: 85.5 FL (ref 81–99)
MONOCYTES # BLD AUTO: 0.8 10*3/UL (ref 0.2–0.8)
MONOCYTES NFR BLD AUTO: 8.8 % (ref 4.4–11.3)
NEUTS SEG NFR BLD AUTO: 74.3 % (ref 38.7–80)
PLATELET # BLD AUTO: 289 X10E3/UL (ref 140–360)
POTASSIUM SERPL-SCNC: 2.8 MMOL/L (ref 3.5–5.1)
RBC # BLD AUTO: 3.31 X10E6/UL (ref 3.6–5.1)
SODIUM SERPL-SCNC: 141 MMOL/L (ref 136–145)

## 2018-01-16 RX ADMIN — ALPRAZOLAM SCH MG: 0.25 TABLET ORAL at 14:00

## 2018-01-16 RX ADMIN — MESALAMINE SCH MG: 400 TABLET, DELAYED RELEASE ORAL at 15:00

## 2018-01-16 RX ADMIN — ENOXAPARIN SODIUM SCH MG: 60 INJECTION SUBCUTANEOUS at 09:11

## 2018-01-16 RX ADMIN — METRONIDAZOLE SCH MLS/HR: 500 INJECTION, SOLUTION INTRAVENOUS at 00:42

## 2018-01-16 RX ADMIN — Medication SCH MG: at 21:30

## 2018-01-16 RX ADMIN — ENOXAPARIN SODIUM SCH MG: 60 INJECTION SUBCUTANEOUS at 21:29

## 2018-01-16 RX ADMIN — TEMAZEPAM SCH MG: 15 CAPSULE ORAL at 23:08

## 2018-01-16 RX ADMIN — METOPROLOL TARTRATE SCH MG: 50 TABLET, FILM COATED ORAL at 09:10

## 2018-01-16 RX ADMIN — Medication SCH MG: at 09:10

## 2018-01-16 RX ADMIN — SODIUM CHLORIDE SCH MG: 900 INJECTION INTRAVENOUS at 09:10

## 2018-01-16 RX ADMIN — Medication SCH MG: at 14:00

## 2018-01-16 RX ADMIN — TAZOBACTAM SODIUM AND PIPERACILLIN SODIUM SCH MLS/HR: 375; 3 INJECTION, SOLUTION INTRAVENOUS at 06:21

## 2018-01-16 RX ADMIN — SODIUM CHLORIDE SCH MG: 900 INJECTION INTRAVENOUS at 21:27

## 2018-01-16 RX ADMIN — ALPRAZOLAM SCH MG: 0.25 TABLET ORAL at 05:14

## 2018-01-16 RX ADMIN — METRONIDAZOLE SCH MLS/HR: 500 INJECTION, SOLUTION INTRAVENOUS at 05:14

## 2018-01-16 RX ADMIN — METRONIDAZOLE SCH MLS/HR: 500 INJECTION, SOLUTION INTRAVENOUS at 12:00

## 2018-01-16 RX ADMIN — TAZOBACTAM SODIUM AND PIPERACILLIN SODIUM SCH MLS/HR: 375; 3 INJECTION, SOLUTION INTRAVENOUS at 17:13

## 2018-01-16 RX ADMIN — MESALAMINE SCH MG: 400 TABLET, DELAYED RELEASE ORAL at 09:10

## 2018-01-16 RX ADMIN — MESALAMINE SCH MG: 400 TABLET, DELAYED RELEASE ORAL at 21:29

## 2018-01-16 RX ADMIN — Medication SCH MG: at 05:14

## 2018-01-16 RX ADMIN — Medication SCH MG: at 09:11

## 2018-01-16 RX ADMIN — METRONIDAZOLE SCH MLS/HR: 500 INJECTION, SOLUTION INTRAVENOUS at 16:30

## 2018-01-16 RX ADMIN — ALPRAZOLAM SCH MG: 0.25 TABLET ORAL at 21:30

## 2018-01-16 RX ADMIN — Medication SCH MG: at 21:29

## 2018-01-16 RX ADMIN — METOPROLOL TARTRATE SCH MG: 50 TABLET, FILM COATED ORAL at 21:29

## 2018-01-16 RX ADMIN — TAZOBACTAM SODIUM AND PIPERACILLIN SODIUM SCH MLS/HR: 375; 3 INJECTION, SOLUTION INTRAVENOUS at 12:00

## 2018-01-16 NOTE — PROGRESS NOTE
DATE:  January 16, 2018 



TIME:  7 a.m.



OVERNIGHT:  She had some diarrhea.  



REVIEW OF SYSTEMS:  Denies any dizziness.  



PHYSICAL EXAMINATION 

VITAL SIGNS:  Reviewed.

GENERAL:  A tired-appearing woman resting in bed. 

HEENT:  Anicteric.  

CARDIOVASCULAR:  Normal S1 and S2.  

LUNGS:  Moderate breath sounds.  

ABDOMEN:  Soft and nontender.  

EXTREMITIES:  No edema.

SKIN:  Dry.

PSYCHIATRIC:  Flat affect.



LABS:  Reviewed.



MEDICATIONS:  Reviewed.



ASSESSMENT:  A 77-year-old woman with:

1.  Acute enteritis.

2.  Severe hypokalemia.

3.  Urinary tract infection. 

4.  Gastrointestinal bleed.

5.  Severe sepsis.

6.  Atrial fibrillation.

7.  Diarrhea.

8.  Normocytic anemia.

9.  Non-ST-segment elevation myocardial infarction. 

10. Escherichia coli urinary tract infection. 



PLAN 

1.  Continue IV antibiotics.

2.  Continue IV fluids.

3.  Continue IV steroids.

4.  Continue mesalamine. 

5.  Patient on Lovenox.

6.  Follow up labs this morning.  

7.  Replace potassium as appropriate.  









DD:  01/16/2018 07:09

DT:  01/16/2018 07:51

Job#:  [id number]

## 2018-01-16 NOTE — PROGRESS NOTE
DATE:  January 13, 2018 



TIME:  8:45 a.m.



OVERNIGHT:  No events.



REVIEW OF SYSTEMS:  Denies any dizziness.



PHYSICAL EXAMINATION:

VITAL SIGNS:  Reviewed.

GENERAL APPEARANCE:  Tired-appearing woman resting in bed.

HEENT:  Anicteric.

CARDIOVASCULAR:  Normal S1 and S2.

LUNGS:  Moderate breath sounds.

ABDOMEN:  Soft, nontender.

EXTREMITIES:  No edema.

SKIN:  Dry.

PSYCHIATRIC:  Flat affect.



LABS:  Reviewed.



MEDICATIONS:  Reviewed.



ASSESSMENT:  A 77-year-old woman:

1. Acute enteritis.

2. Urinary tract infection with Escherichia coli.

3. Gastrointestinal bleed.

4. Severe sepsis.

5. Atrial fibrillation.

6. Normocytic anemia.

7. Hypokalemia.

8. Non-ST elevation myocardial infarction.



PLAN:

1. Continue IV antibiotics.

2. Continue diet.

3. Continue IV steroids.

4. Continue mesalamine.

5. Continue Lovenox b.i.d. and beta blocker.

6. Continue Zosyn for E. coli UTI.

7. Discharge planning.







DD:  01/16/2018 07:08

DT:  01/16/2018 07:46

Job#:  [id number]

## 2018-01-17 VITALS — SYSTOLIC BLOOD PRESSURE: 151 MMHG | DIASTOLIC BLOOD PRESSURE: 82 MMHG

## 2018-01-17 VITALS — DIASTOLIC BLOOD PRESSURE: 79 MMHG | SYSTOLIC BLOOD PRESSURE: 153 MMHG

## 2018-01-17 VITALS — DIASTOLIC BLOOD PRESSURE: 88 MMHG | SYSTOLIC BLOOD PRESSURE: 159 MMHG

## 2018-01-17 VITALS — DIASTOLIC BLOOD PRESSURE: 93 MMHG | SYSTOLIC BLOOD PRESSURE: 188 MMHG

## 2018-01-17 VITALS — SYSTOLIC BLOOD PRESSURE: 175 MMHG | DIASTOLIC BLOOD PRESSURE: 84 MMHG

## 2018-01-17 VITALS — DIASTOLIC BLOOD PRESSURE: 71 MMHG | SYSTOLIC BLOOD PRESSURE: 149 MMHG

## 2018-01-17 VITALS — SYSTOLIC BLOOD PRESSURE: 108 MMHG | DIASTOLIC BLOOD PRESSURE: 59 MMHG

## 2018-01-17 RX ADMIN — ENOXAPARIN SODIUM SCH MG: 60 INJECTION SUBCUTANEOUS at 20:48

## 2018-01-17 RX ADMIN — ALPRAZOLAM SCH MG: 0.25 TABLET ORAL at 05:28

## 2018-01-17 RX ADMIN — TAZOBACTAM SODIUM AND PIPERACILLIN SODIUM SCH MLS/HR: 375; 3 INJECTION, SOLUTION INTRAVENOUS at 06:37

## 2018-01-17 RX ADMIN — MESALAMINE SCH MG: 400 TABLET, DELAYED RELEASE ORAL at 20:48

## 2018-01-17 RX ADMIN — ENOXAPARIN SODIUM SCH MG: 60 INJECTION SUBCUTANEOUS at 09:30

## 2018-01-17 RX ADMIN — METRONIDAZOLE SCH MLS/HR: 500 INJECTION, SOLUTION INTRAVENOUS at 05:28

## 2018-01-17 RX ADMIN — ALPRAZOLAM SCH MG: 0.25 TABLET ORAL at 23:55

## 2018-01-17 RX ADMIN — Medication SCH MG: at 09:29

## 2018-01-17 RX ADMIN — Medication SCH MG: at 14:33

## 2018-01-17 RX ADMIN — SODIUM CHLORIDE SCH MG: 900 INJECTION INTRAVENOUS at 20:47

## 2018-01-17 RX ADMIN — TAZOBACTAM SODIUM AND PIPERACILLIN SODIUM SCH MLS/HR: 375; 3 INJECTION, SOLUTION INTRAVENOUS at 00:16

## 2018-01-17 RX ADMIN — METRONIDAZOLE SCH MLS/HR: 500 INJECTION, SOLUTION INTRAVENOUS at 11:10

## 2018-01-17 RX ADMIN — TAZOBACTAM SODIUM AND PIPERACILLIN SODIUM SCH MLS/HR: 375; 3 INJECTION, SOLUTION INTRAVENOUS at 17:36

## 2018-01-17 RX ADMIN — METOPROLOL TARTRATE SCH MG: 50 TABLET, FILM COATED ORAL at 09:30

## 2018-01-17 RX ADMIN — METRONIDAZOLE SCH MLS/HR: 500 INJECTION, SOLUTION INTRAVENOUS at 00:51

## 2018-01-17 RX ADMIN — METRONIDAZOLE SCH MLS/HR: 500 INJECTION, SOLUTION INTRAVENOUS at 17:59

## 2018-01-17 RX ADMIN — TAZOBACTAM SODIUM AND PIPERACILLIN SODIUM SCH MLS/HR: 375; 3 INJECTION, SOLUTION INTRAVENOUS at 12:19

## 2018-01-17 RX ADMIN — MESALAMINE SCH MG: 400 TABLET, DELAYED RELEASE ORAL at 14:33

## 2018-01-17 RX ADMIN — Medication SCH MG: at 09:30

## 2018-01-17 RX ADMIN — SODIUM CHLORIDE SCH MG: 900 INJECTION INTRAVENOUS at 09:29

## 2018-01-17 RX ADMIN — Medication SCH MG: at 20:48

## 2018-01-17 RX ADMIN — METOPROLOL TARTRATE SCH MG: 50 TABLET, FILM COATED ORAL at 20:48

## 2018-01-17 RX ADMIN — Medication SCH MG: at 05:28

## 2018-01-17 RX ADMIN — TEMAZEPAM SCH MG: 15 CAPSULE ORAL at 21:36

## 2018-01-17 RX ADMIN — ALPRAZOLAM SCH MG: 0.25 TABLET ORAL at 14:33

## 2018-01-17 RX ADMIN — MESALAMINE SCH MG: 400 TABLET, DELAYED RELEASE ORAL at 09:30

## 2018-01-17 RX ADMIN — Medication SCH MG: at 22:56

## 2018-01-18 VITALS — DIASTOLIC BLOOD PRESSURE: 87 MMHG | SYSTOLIC BLOOD PRESSURE: 168 MMHG

## 2018-01-18 VITALS — DIASTOLIC BLOOD PRESSURE: 71 MMHG | SYSTOLIC BLOOD PRESSURE: 149 MMHG

## 2018-01-18 VITALS — DIASTOLIC BLOOD PRESSURE: 96 MMHG | SYSTOLIC BLOOD PRESSURE: 195 MMHG

## 2018-01-18 VITALS — SYSTOLIC BLOOD PRESSURE: 145 MMHG | DIASTOLIC BLOOD PRESSURE: 74 MMHG

## 2018-01-18 VITALS — SYSTOLIC BLOOD PRESSURE: 136 MMHG | DIASTOLIC BLOOD PRESSURE: 76 MMHG

## 2018-01-18 VITALS — SYSTOLIC BLOOD PRESSURE: 158 MMHG | DIASTOLIC BLOOD PRESSURE: 81 MMHG

## 2018-01-18 VITALS — DIASTOLIC BLOOD PRESSURE: 81 MMHG | SYSTOLIC BLOOD PRESSURE: 158 MMHG

## 2018-01-18 LAB
ALBUMIN SERPL-MCNC: 2.6 G/DL (ref 3.5–5)
ALBUMIN/GLOB SERPL: 1 {RATIO} (ref 0.8–2)
ALP SERPL-CCNC: 39 IU/L (ref 40–150)
ALT SERPL-CCNC: 9 IU/L (ref 0–55)
ANION GAP SERPL CALC-SCNC: 13.5 MMOL/L (ref 8–16)
BASOPHILS # BLD AUTO: 0 10*3/UL (ref 0–0.1)
BASOPHILS NFR BLD AUTO: 0.1 % (ref 0–1)
BUN SERPL-MCNC: 7 MG/DL (ref 7–26)
BUN/CREAT SERPL: 13 (ref 6–25)
CALCIUM SERPL-MCNC: 7.3 MG/DL (ref 8.4–10.2)
CHLORIDE SERPL-SCNC: 102 MMOL/L (ref 98–107)
CO2 SERPL-SCNC: 30 MMOL/L (ref 22–29)
DEPRECATED NEUTROPHILS # BLD AUTO: 5 10*3/UL (ref 2.1–6.9)
EGFRCR SERPLBLD CKD-EPI 2021: > 60 ML/MIN (ref 60–?)
EOSINOPHIL # BLD AUTO: 0 10*3/UL (ref 0–0.4)
EOSINOPHIL NFR BLD AUTO: 0.1 % (ref 0–6)
ERYTHROCYTE [DISTWIDTH] IN CORD BLOOD: 18.9 % (ref 11.7–14.4)
GLOBULIN PLAS-MCNC: 2.5 G/DL (ref 2.3–3.5)
GLUCOSE SERPLBLD-MCNC: 141 MG/DL (ref 74–118)
HCT VFR BLD AUTO: 30.7 % (ref 34.2–44.1)
HGB BLD-MCNC: 9.6 G/DL (ref 12–16)
LYMPHOCYTES # BLD: 1.6 10*3/UL (ref 1–3.2)
LYMPHOCYTES NFR BLD AUTO: 21.6 % (ref 18–39.1)
MCH RBC QN AUTO: 27.1 PG (ref 28–32)
MCHC RBC AUTO-ENTMCNC: 31.3 G/DL (ref 31–35)
MCV RBC AUTO: 86.7 FL (ref 81–99)
MONOCYTES # BLD AUTO: 0.4 10*3/UL (ref 0.2–0.8)
MONOCYTES NFR BLD AUTO: 5.7 % (ref 4.4–11.3)
NEUTS SEG NFR BLD AUTO: 69.4 % (ref 38.7–80)
PLATELET # BLD AUTO: 297 X10E3/UL (ref 140–360)
POTASSIUM SERPL-SCNC: 2.5 MMOL/L (ref 3.5–5.1)
RBC # BLD AUTO: 3.54 X10E6/UL (ref 3.6–5.1)
SODIUM SERPL-SCNC: 143 MMOL/L (ref 136–145)

## 2018-01-18 PROCEDURE — 0DB38ZX EXCISION OF LOWER ESOPHAGUS, VIA NATURAL OR ARTIFICIAL OPENING ENDOSCOPIC, DIAGNOSTIC: ICD-10-PCS | Performed by: INTERNAL MEDICINE

## 2018-01-18 PROCEDURE — 0DB78ZX EXCISION OF STOMACH, PYLORUS, VIA NATURAL OR ARTIFICIAL OPENING ENDOSCOPIC, DIAGNOSTIC: ICD-10-PCS | Performed by: INTERNAL MEDICINE

## 2018-01-18 PROCEDURE — 0DBE8ZX EXCISION OF LARGE INTESTINE, VIA NATURAL OR ARTIFICIAL OPENING ENDOSCOPIC, DIAGNOSTIC: ICD-10-PCS | Performed by: INTERNAL MEDICINE

## 2018-01-18 PROCEDURE — 0DBB8ZX EXCISION OF ILEUM, VIA NATURAL OR ARTIFICIAL OPENING ENDOSCOPIC, DIAGNOSTIC: ICD-10-PCS | Performed by: INTERNAL MEDICINE

## 2018-01-18 PROCEDURE — 0DB98ZX EXCISION OF DUODENUM, VIA NATURAL OR ARTIFICIAL OPENING ENDOSCOPIC, DIAGNOSTIC: ICD-10-PCS | Performed by: INTERNAL MEDICINE

## 2018-01-18 RX ADMIN — Medication SCH MG: at 06:19

## 2018-01-18 RX ADMIN — POTASSIUM CHLORIDE SCH MEQ: 1500 TABLET, EXTENDED RELEASE ORAL at 22:30

## 2018-01-18 RX ADMIN — MESALAMINE SCH MG: 400 TABLET, DELAYED RELEASE ORAL at 07:55

## 2018-01-18 RX ADMIN — Medication SCH MG: at 15:33

## 2018-01-18 RX ADMIN — POTASSIUM CHLORIDE SCH MEQ: 1500 TABLET, EXTENDED RELEASE ORAL at 22:46

## 2018-01-18 RX ADMIN — ALPRAZOLAM SCH MG: 0.25 TABLET ORAL at 20:57

## 2018-01-18 RX ADMIN — Medication SCH MG: at 07:54

## 2018-01-18 RX ADMIN — HYDRALAZINE HYDROCHLORIDE SCH MG: 25 TABLET ORAL at 22:30

## 2018-01-18 RX ADMIN — METRONIDAZOLE SCH MLS/HR: 500 INJECTION, SOLUTION INTRAVENOUS at 23:13

## 2018-01-18 RX ADMIN — Medication SCH MG: at 09:00

## 2018-01-18 RX ADMIN — TAZOBACTAM SODIUM AND PIPERACILLIN SODIUM SCH MLS/HR: 375; 3 INJECTION, SOLUTION INTRAVENOUS at 18:01

## 2018-01-18 RX ADMIN — MESALAMINE SCH MG: 400 TABLET, DELAYED RELEASE ORAL at 20:57

## 2018-01-18 RX ADMIN — MESALAMINE SCH MG: 400 TABLET, DELAYED RELEASE ORAL at 15:27

## 2018-01-18 RX ADMIN — ENOXAPARIN SODIUM SCH MG: 60 INJECTION SUBCUTANEOUS at 20:57

## 2018-01-18 RX ADMIN — TAZOBACTAM SODIUM AND PIPERACILLIN SODIUM SCH MLS/HR: 375; 3 INJECTION, SOLUTION INTRAVENOUS at 05:16

## 2018-01-18 RX ADMIN — HYDRALAZINE HYDROCHLORIDE SCH MG: 25 TABLET ORAL at 17:06

## 2018-01-18 RX ADMIN — TAZOBACTAM SODIUM AND PIPERACILLIN SODIUM SCH MLS/HR: 375; 3 INJECTION, SOLUTION INTRAVENOUS at 00:14

## 2018-01-18 RX ADMIN — SODIUM CHLORIDE SCH MG: 900 INJECTION INTRAVENOUS at 20:57

## 2018-01-18 RX ADMIN — SODIUM CHLORIDE SCH MG: 900 INJECTION INTRAVENOUS at 08:09

## 2018-01-18 RX ADMIN — METRONIDAZOLE SCH MLS/HR: 500 INJECTION, SOLUTION INTRAVENOUS at 06:19

## 2018-01-18 RX ADMIN — METRONIDAZOLE SCH MLS/HR: 500 INJECTION, SOLUTION INTRAVENOUS at 00:45

## 2018-01-18 RX ADMIN — TAZOBACTAM SODIUM AND PIPERACILLIN SODIUM SCH MLS/HR: 375; 3 INJECTION, SOLUTION INTRAVENOUS at 23:14

## 2018-01-18 RX ADMIN — ENOXAPARIN SODIUM SCH MG: 60 INJECTION SUBCUTANEOUS at 09:00

## 2018-01-18 RX ADMIN — METOPROLOL TARTRATE SCH MG: 50 TABLET, FILM COATED ORAL at 15:33

## 2018-01-18 RX ADMIN — ALPRAZOLAM SCH MG: 0.25 TABLET ORAL at 06:00

## 2018-01-18 RX ADMIN — METOPROLOL TARTRATE SCH MG: 50 TABLET, FILM COATED ORAL at 09:00

## 2018-01-18 RX ADMIN — METRONIDAZOLE SCH MLS/HR: 500 INJECTION, SOLUTION INTRAVENOUS at 17:03

## 2018-01-18 NOTE — PROGRESS NOTE
DATE:  January 18, 2018 



TIME OF SERVICE:  4:55 p.m. 



SUBJECTIVE:  Overnight no events. 



REVIEW OF SYSTEMS:  Denies any dizziness.  



PHYSICAL EXAMINATION 

VITAL SIGNS:  Reviewed.

GENERAL:  A tired-appearing woman resting in bed. 

HEENT:  Anicteric.  

CARDIOVASCULAR:  Normal S1 and S2.  No murmurs.  

LUNGS:  Moderate breath sounds.  

ABDOMEN:  Soft and nontender.  

EXTREMITIES:  No edema.

SKIN:  Dry.

PSYCHIATRIC: Flat affect. 



LABS:  Reviewed.



MEDICATIONS:  Reviewed.



ASSESSMENT:  A 77-year-old woman with:

1.  Acute enteritis.

2.  Severe hypokalemia.

3.  Urinary tract infection. 

4.  Gastrointestinal bleed.

5.  Severe sepsis.

6.  Atrial fibrillation.

7.  Diarrhea.

8.  Normocytic anemia.

9.  Non-ST-segment elevation myocardial infarction. 

10. Escherichia coli urinary tract infection. 



PLAN 

1.  Continue IV antibiotics.

2.  Continue Azolimine. 

3.  Continue Apresoline. 

4.  Continue fluconazole.  

5.  Continue Flagyl and Zosyn.  

6.  Potassium has improved.  

7.  The patient has tolerated diet.  Will continue antiemetics and replace 

potassium as appropriate.  

8.  Plan discharge planning tomorrow.  









DD:  01/18/2018 16:56

DT:  01/18/2018 17:14

Job#:  [id number]

## 2018-01-18 NOTE — PROGRESS NOTE
DATE:  January 17, 2018 



TIME OF SERVICE:  3:30 p.m. 



SUBJECTIVE:  Overnight the patient had some diarrhea.  



REVIEW OF SYSTEMS:  Denies any dizziness.  



PHYSICAL EXAMINATION 

VITAL SIGNS:  Reviewed.

GENERAL:  A tired-appearing woman resting in bed. 

HEENT:  Anicteric.  

CARDIOVASCULAR:  Normal S1 and S2.  No murmurs.  

LUNGS:  Moderate breath sounds.  

ABDOMEN:  Soft and nontender.  

EXTREMITIES:  No edema.

SKIN:  Dry.

PSYCHIATRIC:  Normal affect.  



LABS:  Reviewed.



MEDICATIONS:  Reviewed.



ASSESSMENT:  A 77-year-old woman with:

1.  Acute enteritis.

2.  Severe hypokalemia.

3.  Urinary tract infection. 

4.  Gastrointestinal bleed.

5.  Severe sepsis.

6.  Atrial fibrillation.

7.  Diarrhea.

8.  Normocytic anemia.

9.  Non-ST-segment elevation myocardial infarction. 

10. Escherichia coli urinary tract infection. 



PLAN 

1.  Continue IV antibiotics.

2.  Continue replacement of potassium. 

3.  Continue steroid therapy.  

4.  Reintroduce diet and use antiemetics.  

5.  Discharge planning.  











DD:  01/18/2018 16:55

DT:  01/18/2018 17:09

Job#:  [id number]

## 2018-01-19 VITALS — DIASTOLIC BLOOD PRESSURE: 78 MMHG | SYSTOLIC BLOOD PRESSURE: 138 MMHG

## 2018-01-19 VITALS — SYSTOLIC BLOOD PRESSURE: 108 MMHG | DIASTOLIC BLOOD PRESSURE: 65 MMHG

## 2018-01-19 VITALS — SYSTOLIC BLOOD PRESSURE: 136 MMHG | DIASTOLIC BLOOD PRESSURE: 79 MMHG

## 2018-01-19 VITALS — DIASTOLIC BLOOD PRESSURE: 79 MMHG | SYSTOLIC BLOOD PRESSURE: 128 MMHG

## 2018-01-19 VITALS — SYSTOLIC BLOOD PRESSURE: 114 MMHG | DIASTOLIC BLOOD PRESSURE: 61 MMHG

## 2018-01-19 LAB
ANION GAP SERPL CALC-SCNC: 13.7 MMOL/L (ref 8–16)
BASOPHILS # BLD AUTO: 0 10*3/UL (ref 0–0.1)
BASOPHILS NFR BLD AUTO: 0.3 % (ref 0–1)
BUN SERPL-MCNC: 8 MG/DL (ref 7–26)
BUN/CREAT SERPL: 14 (ref 6–25)
CALCIUM SERPL-MCNC: 7.6 MG/DL (ref 8.4–10.2)
CHLORIDE SERPL-SCNC: 105 MMOL/L (ref 98–107)
CO2 SERPL-SCNC: 27 MMOL/L (ref 22–29)
DEPRECATED NEUTROPHILS # BLD AUTO: 7.5 10*3/UL (ref 2.1–6.9)
EGFRCR SERPLBLD CKD-EPI 2021: > 60 ML/MIN (ref 60–?)
EOSINOPHIL # BLD AUTO: 0.1 10*3/UL (ref 0–0.4)
EOSINOPHIL NFR BLD AUTO: 0.8 % (ref 0–6)
ERYTHROCYTE [DISTWIDTH] IN CORD BLOOD: 19.9 % (ref 11.7–14.4)
GLUCOSE SERPLBLD-MCNC: 136 MG/DL (ref 74–118)
HCT VFR BLD AUTO: 31.9 % (ref 34.2–44.1)
HGB BLD-MCNC: 9.9 G/DL (ref 12–16)
LYMPHOCYTES # BLD: 2.9 10*3/UL (ref 1–3.2)
LYMPHOCYTES NFR BLD AUTO: 25.9 % (ref 18–39.1)
MCH RBC QN AUTO: 27.1 PG (ref 28–32)
MCHC RBC AUTO-ENTMCNC: 31 G/DL (ref 31–35)
MCV RBC AUTO: 87.4 FL (ref 81–99)
MONOCYTES # BLD AUTO: 0.7 10*3/UL (ref 0.2–0.8)
MONOCYTES NFR BLD AUTO: 5.8 % (ref 4.4–11.3)
NEUTS SEG NFR BLD AUTO: 65.9 % (ref 38.7–80)
PLATELET # BLD AUTO: 276 X10E3/UL (ref 140–360)
POTASSIUM SERPL-SCNC: 3.7 MMOL/L (ref 3.5–5.1)
RBC # BLD AUTO: 3.65 X10E6/UL (ref 3.6–5.1)
SODIUM SERPL-SCNC: 142 MMOL/L (ref 136–145)

## 2018-01-19 RX ADMIN — ALPRAZOLAM SCH MG: 0.25 TABLET ORAL at 16:23

## 2018-01-19 RX ADMIN — SODIUM CHLORIDE SCH MG: 900 INJECTION INTRAVENOUS at 09:07

## 2018-01-19 RX ADMIN — TAZOBACTAM SODIUM AND PIPERACILLIN SODIUM SCH MLS/HR: 375; 3 INJECTION, SOLUTION INTRAVENOUS at 05:41

## 2018-01-19 RX ADMIN — ALPRAZOLAM SCH MG: 0.25 TABLET ORAL at 05:42

## 2018-01-19 RX ADMIN — Medication SCH MG: at 09:08

## 2018-01-19 RX ADMIN — TAZOBACTAM SODIUM AND PIPERACILLIN SODIUM SCH MLS/HR: 375; 3 INJECTION, SOLUTION INTRAVENOUS at 12:45

## 2018-01-19 RX ADMIN — METOPROLOL TARTRATE SCH MG: 50 TABLET, FILM COATED ORAL at 09:08

## 2018-01-19 RX ADMIN — HYDRALAZINE HYDROCHLORIDE SCH MG: 25 TABLET ORAL at 05:41

## 2018-01-19 RX ADMIN — ENOXAPARIN SODIUM SCH MG: 60 INJECTION SUBCUTANEOUS at 09:08

## 2018-01-19 RX ADMIN — METRONIDAZOLE SCH MLS/HR: 500 INJECTION, SOLUTION INTRAVENOUS at 11:33

## 2018-01-19 RX ADMIN — MESALAMINE SCH MG: 400 TABLET, DELAYED RELEASE ORAL at 09:08

## 2018-01-19 RX ADMIN — MESALAMINE SCH MG: 400 TABLET, DELAYED RELEASE ORAL at 16:23

## 2018-01-19 RX ADMIN — METRONIDAZOLE SCH MLS/HR: 500 INJECTION, SOLUTION INTRAVENOUS at 05:41

## 2018-01-19 RX ADMIN — HYDRALAZINE HYDROCHLORIDE SCH MG: 25 TABLET ORAL at 16:24

## 2018-01-19 NOTE — DISCHARGE SUMMARY
PRINCIPAL DIAGNOSES

1. Acute enteritis.

2. Severe hypokalemia.

3. Escherichia coli urinary tract infection.

4. Recurrent gastrointestinal bleed.

5. Severe sepsis. 

6. Atrial fibrillation.

7. Diarrhea.

8. Normocytic anemia.

9. Non-ST-elevation myocardial infarction.



SECONDARY DIAGNOSIS:  Atrial fibrillation.



CHIEF COMPLAINT:  Nausea, vomiting, diarrhea.



HISTORY OF PRESENT ILLNESS:  This is a 77-year-old women developing nausea, 

vomiting, and diarrhea.  Please refer to the H and P for further details. 



HOSPITAL COURSE:  The patient had diarrhea with severe hypokalemia.  She 

received repeated doses of IV potassium and oral potassium.  She had acute 

enteritis.  Testing for recurrent metabolic disease was negative.  GI 

service assisted in management.  The patient will need to follow up closely 

as an outpatient.  The patient has been started on mesalamine.  The patient 

has received IV antibiotics throughout the hospitalization.  She had a 

urinary tract infection treated with antibiotics.  She had bleeding that 

has resolved.  The severe sepsis has also resolved.  Atrial fibrillation:  

The heart rate is controlled.  The diarrhea is intermittent but is somewhat 

resolved.  She had non-ST-elevation MI and E. coli urinary tract infection. 

 The non-ST-elevation MI was treated medically. The patient will need to 

follow up with cardiology as an outpatient and with GI service.  



DISCHARGE MEDICATIONS:  Per electronic medical record and include 

mesalamine and Flagyl.  



FOLLOWUP

1.  Follow up with primary care doctor in 1 week.

2.  Follow up with GI service in 2 weeks. 

3.  Follow up with cardiology in 2 weeks.



CONDITION ON DISCHARGE:  Stable and improving.



DISCHARGE LOCATION:  Home with home health and physical therapy.



 



 _________________________________

FELECIA ALLISON MD



DD:  01/19/2018 09:41

DT:  01/19/2018 09:59

Job#:  [id number]

## 2018-03-30 ENCOUNTER — HOSPITAL ENCOUNTER (EMERGENCY)
Dept: HOSPITAL 88 - ER | Age: 78
Discharge: HOME | End: 2018-03-30
Payer: MEDICARE

## 2018-03-30 VITALS — DIASTOLIC BLOOD PRESSURE: 64 MMHG | SYSTOLIC BLOOD PRESSURE: 121 MMHG

## 2018-03-30 VITALS — BODY MASS INDEX: 21.16 KG/M2 | HEIGHT: 62 IN | WEIGHT: 115 LBS

## 2018-03-30 DIAGNOSIS — N39.0: ICD-10-CM

## 2018-03-30 DIAGNOSIS — R11.0: ICD-10-CM

## 2018-03-30 DIAGNOSIS — R42: Primary | ICD-10-CM

## 2018-03-30 LAB
ALBUMIN SERPL-MCNC: 2.5 G/DL (ref 3.5–5)
ALBUMIN/GLOB SERPL: 0.7 {RATIO} (ref 0.8–2)
ALP SERPL-CCNC: 62 IU/L (ref 40–150)
ALT SERPL-CCNC: 6 IU/L (ref 0–55)
ANION GAP SERPL CALC-SCNC: 11.5 MMOL/L (ref 8–16)
BACTERIA URNS QL MICRO: (no result) /HPF
BASOPHILS # BLD AUTO: 0.1 10*3/UL (ref 0–0.1)
BASOPHILS NFR BLD AUTO: 0.7 % (ref 0–1)
BILIRUB UR QL: (no result)
BUN SERPL-MCNC: 13 MG/DL (ref 7–26)
BUN/CREAT SERPL: 17 (ref 6–25)
CALCIUM SERPL-MCNC: 8 MG/DL (ref 8.4–10.2)
CHLORIDE SERPL-SCNC: 110 MMOL/L (ref 98–107)
CK MB SERPL-MCNC: 0.8 NG/ML (ref 0–5)
CK SERPL-CCNC: 30 IU/L (ref 29–168)
CLARITY UR: (no result)
CO2 SERPL-SCNC: 19 MMOL/L (ref 22–29)
COLOR UR: YELLOW
DEPRECATED NEUTROPHILS # BLD AUTO: 5 10*3/UL (ref 2.1–6.9)
DEPRECATED RBC URNS MANUAL-ACNC: (no result) /HPF (ref 0–5)
EGFRCR SERPLBLD CKD-EPI 2021: > 60 ML/MIN (ref 60–?)
EOSINOPHIL # BLD AUTO: 0 10*3/UL (ref 0–0.4)
EOSINOPHIL NFR BLD AUTO: 0.3 % (ref 0–6)
EOSINOPHIL NFR BLD MANUAL: 1 % (ref 0–7)
EPI CELLS URNS QL MICRO: (no result) /LPF
ERYTHROCYTE [DISTWIDTH] IN CORD BLOOD: 16.4 % (ref 11.7–14.4)
GLOBULIN PLAS-MCNC: 3.4 G/DL (ref 2.3–3.5)
GLUCOSE SERPLBLD-MCNC: 111 MG/DL (ref 74–118)
HCT VFR BLD AUTO: 33 % (ref 34.2–44.1)
HGB BLD-MCNC: 10.4 G/DL (ref 12–16)
KETONES UR QL STRIP.AUTO: NEGATIVE
LEUKOCYTE ESTERASE UR QL STRIP.AUTO: (no result)
LYMPHOCYTES # BLD: 0.8 10*3/UL (ref 1–3.2)
LYMPHOCYTES NFR BLD AUTO: 12.2 % (ref 18–39.1)
LYMPHOCYTES NFR BLD MANUAL: 14 % (ref 19–48)
MCH RBC QN AUTO: 27.4 PG (ref 28–32)
MCHC RBC AUTO-ENTMCNC: 31.5 G/DL (ref 31–35)
MCV RBC AUTO: 87.1 FL (ref 81–99)
MONOCYTES # BLD AUTO: 0.9 10*3/UL (ref 0.2–0.8)
MONOCYTES NFR BLD AUTO: 12.6 % (ref 4.4–11.3)
MONOCYTES NFR BLD MANUAL: 15 % (ref 3.4–9)
NEUTS BAND NFR BLD MANUAL: 18 %
NEUTS SEG NFR BLD AUTO: 73.8 % (ref 38.7–80)
NEUTS SEG NFR BLD MANUAL: 50 % (ref 40–74)
NITRITE UR QL STRIP.AUTO: POSITIVE
PLAT MORPH BLD: NORMAL
PLATELET # BLD AUTO: 280 X10E3/UL (ref 140–360)
PLATELET # BLD EST: ADEQUATE 10*3/UL
POTASSIUM SERPL-SCNC: 3.5 MMOL/L (ref 3.5–5.1)
PROT UR QL STRIP.AUTO: NEGATIVE
RBC # BLD AUTO: 3.79 X10E6/UL (ref 3.6–5.1)
RBC MORPH BLD: NORMAL
SODIUM SERPL-SCNC: 137 MMOL/L (ref 136–145)
SP GR UR STRIP: 1.02 (ref 1.01–1.02)
URATE CRY URNS QL MICRO: (no result)
UROBILINOGEN UR STRIP-MCNC: 0.2 MG/DL (ref 0.2–1)
WBC #/AREA URNS HPF: (no result) /HPF (ref 0–5)

## 2018-03-30 PROCEDURE — 81001 URINALYSIS AUTO W/SCOPE: CPT

## 2018-03-30 PROCEDURE — 82550 ASSAY OF CK (CPK): CPT

## 2018-03-30 PROCEDURE — 84484 ASSAY OF TROPONIN QUANT: CPT

## 2018-03-30 PROCEDURE — 99284 EMERGENCY DEPT VISIT MOD MDM: CPT

## 2018-03-30 PROCEDURE — 82553 CREATINE MB FRACTION: CPT

## 2018-03-30 PROCEDURE — 85730 THROMBOPLASTIN TIME PARTIAL: CPT

## 2018-03-30 PROCEDURE — 36415 COLL VENOUS BLD VENIPUNCTURE: CPT

## 2018-03-30 PROCEDURE — 80053 COMPREHEN METABOLIC PANEL: CPT

## 2018-03-30 PROCEDURE — 71046 X-RAY EXAM CHEST 2 VIEWS: CPT

## 2018-03-30 PROCEDURE — 70450 CT HEAD/BRAIN W/O DYE: CPT

## 2018-03-30 PROCEDURE — 87400 INFLUENZA A/B EACH AG IA: CPT

## 2018-03-30 PROCEDURE — 93005 ELECTROCARDIOGRAM TRACING: CPT

## 2018-03-30 PROCEDURE — 85025 COMPLETE CBC W/AUTO DIFF WBC: CPT

## 2018-03-30 RX ADMIN — SODIUM CHLORIDE STA MLS/HR: 9 INJECTION, SOLUTION INTRAVENOUS at 10:38

## 2018-03-30 RX ADMIN — SODIUM CHLORIDE STA MLS/HR: 9 INJECTION, SOLUTION INTRAVENOUS at 12:00

## 2018-03-30 NOTE — XMS REPORT
Patient Summary Document

 Created on: 2018



MORENO PAINTING

External Reference #: [id number]

: 1940

Sex: Female



Demographics







 Address  [address] [place] [address]  [address]

 

 Home Phone  [phone]

 

 Preferred Language  Unknown

 

 Marital Status  Unknown

 

 Bahai Affiliation  Unknown

 

 Race  Unknown

 

 Additional Race(s)  

 

 Ethnic Group  Unknown





Author







 Author  [facility]

 

 Address  Unknown

 

 Phone  Unavailable







Care Team Providers







 Care Team Member Name  Role  Phone

 

 FELECIA ALLISON  Unavailable  Unavailable







Problems

This patient has no known problems.



Allergies, Adverse Reactions, Alerts

This patient has no known allergies or adverse reactions.



Medications

This patient has no known medications.



Results







 Test Description  Test Time  Test Comments  Text Results  Atomic Results  
Result Comments









 SMALL BOWEL SERIES            [place] [address]      Patient Name: MORENO PAINTING   MR #: [id number]    : 1940 Age/Sex: 77/F  Acct #: 
[id number] Req #: [id number]  Adm Physician: FELECIA ALLISON MD    Ordered by: 
JACQUELINE JOHNSON MD  Report #: [id number]   Location: [facility]  Room/Bed: [place] [address] 
   _____________________________________________________________________________
______________________    Procedure: [id number] DX/SMALL BOWEL SERIES  Exam Date
: 01/10/18                            Exam Time: 1400       REPORT STATUS: 
Signed    PROCEDURE:   SMALL BOWEL SERIES   COMPARISON:   [facility], CT, CT ABDOMEN/PELVIS W,    1/10/2018, 5:09.   INDICATIONS:   ENTERITIS
       TEQUNIQUE: A  radiograph was performed. The patient was given    
barium to drink and sequential images of the abdomen were obtained    through 
90 minutes until the contrast had reached the colon. Spot    images of the 
small bowel and terminal ileum were obtained.   Fluoro time: 3.3 minutes       
FINDINGS:   : Nonobstructive bowel gas pattern. Contrast is noted in the  
  bladder.       Small bowel:    Multiple focal outpouchings are noted in the 
duodenum, consistent with    diverticula.   Evaluation of the proximal small 
bowel is limited by flocculation of    contrast.   Transit time is normal.   
There are several loops of distal ileum which show long segments of    luminal 
reduction and cobblestoning of the mucosa, corresponding to    previously 
visualized thickened ileum on CT dated 1/10/2018    The terminal ileum is not 
well-visualized.   No focal mass is seen.           CONCLUSION:      Findings 
in the distal ileum consistent with previously visualized    enteritis on CT 
dated 1/10/2018. Inflammatory bowel disease    (particularly Crohn's) is a 
consideration despite the patient's age,    given the history of prior 
episodes. Infectious enteritis is less    likely.        Lei Barnett M.D.     Dictated by:  Lei Barnett M.D. on 1/10/2018 at 19:40        
Electronically approved by:  Lei Barnett M.D. on 1/10/2018 at    19:40  
              Dictated By: LEI BARNETT MD  Electronically Signed By: LEI BARNETT MD on 01/10/18 1940  Transcribed By: ROBERTO on 01/10/18 1940       COPY 
TO:   JACQUELINE JOHNSON MD           

 

 CHEST XRAY LINE PLACEMENT            [place] [address]      Patient Name: MORENO PAINTING   MR #: [id number]    : 1940 Age/Sex: 77/F  Acct #: 
[id number] Req #: [id number]  Adm Physician: FELECIA ALLISON MD    Ordered by: 
BOB LEON MD  Report #: [id number]   Location: [facility]  Room/Bed: [place] [address]    ___________________________________________________________________________
________________________    Procedure: [id number] DX/CHEST XRAY LINE PLACEMENT  
Exam Date:                             Exam Time:        REPORT STATUS: Signed 
   PROCEDURE:   A single AP view of the chest.       COMPARISON: Chest 2 views 1
/10/2018.       INDICATIONS:   CENTRAL LINE PLACEMENT           FINDINGS:   
Lines/tubes: Right internal jugular temporary central venous catheter    with 
tip projecting over the expected region of the right atrium.       Lungs:  The 
lungs are well inflated and clear. There is no evidence of    pneumonia or 
pulmonary edema.       Pleura:  There is no pleural effusion or pneumothorax.  
     Heart and mediastinum:  The heart and the mediastinum are unremarkable.    
Atherosclerotic calcifications.       Bones:  No acute bony abnormality. 
Degenerative changes of the thoracic    spine.       IMPRESSION:        No 
acute radiographic abnormality.       Dictated by:  Keegan Perkins M.D. on 1/10/
2018 at 8:29        Electronically approved by:  Keegan Perkins M.D. on 1/10/2018 
at 8:29                Dictated By: KEEGAN PERKINS MD  Electronically Signed By: 
KEEGAN PERKINS MD on 01/10/18 0829  Transcribed By: ROBERTO on 01/10/18 0829       
COPY TO:   BOB LEON MD           

 

 CHEST 2 VIEWS            [place] [address]      Patient Name: MORENO PAINTING 
  MR #: [id number]    : 1940 Age/Sex: 77/F  Acct #: [id number] Req #
: [id number]  Adm Physician:     Ordered by: SCAR GIRON MD  Report #: [id number]
[id number]   Location: ER  Room/Bed:     _____________________________________________
______________________________________________________    Procedure: [id number] 
DX/CHEST 2 VIEWS  Exam Date:                             Exam Time:        
REPORT STATUS: Signed    EXAM: CHEST 2 VIEWS, PA and lateral   DATE: 1/10/2018 4
:08 AM  Time stamp on exam: 0421 hours   INDICATION: Nausea, vomiting   
COMPARISON: None      FINDINGS:   LINES/TUBES: None      LUNGS: No 
consolidations or edema.       PLEURA: No effusions or pneumothorax.      HEART 
AND MEDIASTINUM: Normal size and contour.      BONES AND SOFT TISSUES: No acute 
findings. Hiatal hernia.      IMPRESSION:   No acute thoracic abnormality.     
       Signed by: Dr. Cipriano Mccord M.D. on 1/10/2018 5:05 AM        
Dictated By: CIPRIANO MCCORD MD  Electronically Signed By: CIPRIANO MCCORD MD on 01/
10/18 0505  Transcribed By: BILL on 01/10/18 0505       COPY TO:   SCAR GIRON MD           

 

 CT ABDOMEN/PELVIS W            [place] [address]      Patient Name: MORENO PAINTING   MR #: [id number]    : 1940 Age/Sex: 77/F  Acct #: 
[id number] Req #: [id number]  Adm Physician:     Ordered by: SCAR GIRON MD  Report #: [id number]   Location: ER  Room/Bed:     __________________________
_________________________________________________________________________    
Procedure: [id number] CT/CT ABDOMEN/PELVIS W  Exam Date:                        
     Exam Time:        REPORT STATUS: Signed    EXAM: CT ABDOMEN AND PELVIS 
with IV CONTRAST   DATE: 1/10/2018 4:08 AM  Time stamp on Exam: 0512 hours   
INDICATION:  Abdominal pain   COMPARISON:  None available   TECHNIQUE: The 
abdomen and pelvis were scanned using a multidetector helical   scanner. 
Coronal and sagittal reformations were obtained. Routine protocol   performed. 
  IV Contrast: 100 cc Isovue-370   Oral Contrast: Water   CTDIvol has been 
reviewed. It is below the limits set by the Radiation Protocol   Committee (RPC)
.      FINDINGS:   LOWER THORAX: No consolidations      LIVER: No masses   
BILIARY: Cholelithiasis. No wall thickening or ductal dilation.       SPLEEN: 
No masses   PANCREAS: No masses      ADRENALS: No nodules   KIDNEYS: Symmetric 
perfusion. No enhancing masses. No hydronephrosis.      GI TRACT: Several loops 
of small bowel with enhancing thickened walls and   adjacent vascular 
engorgement. No bowel obstruction. Moderate to large hiatal   hernia. 
Incidental duodenal diverticulum.      VESSELS: Marked atherosclerotic change 
of the abdominal aorta and branches.   PERITONEUM/RETROPERITONEUM: Trace free 
pelvic fluid.   LYMPH NODES: Prominent mesenteric lymph nodes.      
REPRODUCTIVE ORGANS: The uterus and ovaries are not visualized.   BLADDER: 
Unremarkable      SOFT TISSUES: Unremarkable   BONES: No suspicious bone 
lesions.      IMPRESSION:   Nonspecific enteritis involving the distal and 
terminal ileum with associated   vascular congestion and mesenteric 
lymphadenopathy. These findings were   described in a report of a CT of the 
abdomen and pelvis in  (images not   available for comparison), making 
inflammatory enteritis (Crohn's) a likely   cause.      Signed by: Dr. Cipriano Mccord M.D. on 1/10/2018 5:44 AM        Dictated By: CIPRIANO MCCORD MD  
Electronically Signed By: CIPRIANO MCCORD MD on 01/10/18 0544  Transcribed By: 
BILL on 01/10/18 0544       COPY TO:   SCAR GIRON MD           

 

 CT BRAIN WO            [place] [address]      Patient Name: MORENO PAINTING 
  MR #: [id number]    : 1940 Age/Sex: 77/F  Acct #: [id number] Req #
: [id number]  Adm Physician:     Ordered by: SCAR GIRON MD  Report #: [id number]
[id number]   Location: ER  Room/Bed:     _____________________________________________
______________________________________________________    Procedure: [id number] 
CT/CT BRAIN WO  Exam Date:                             Exam Time:        REPORT 
STATUS: Signed    Exam: Head CT without contrast   History:  Nausea, vomiting, 
headache   Comparison studies:  Previous head CT of 2014 is currently 
unavailable on   the PACS for comparison.      Technique:   Axial images were 
obtained from the skull base to the vertex.   Coronal and sagittal images 
reconstructed from the axial data.   Intravenous contrast: None      Findings: 
     Scalp: No abnormalities.   Bones: The sphenoid wings are demineralized 
bilaterally with lytic changes. No   associated soft tissue mass is identified.
      Brain sulci: Appropriate for age.   Ventricles: Normal in size and 
configuration. No hydrocephalus.   Extra-axial spaces: Mildly prominent axial 
spaces of CSF density along the   right anterior temporal convexity is most 
likely represents a small arachnoid   cyst.       Parenchyma:    No mass, acute 
hemorrhage or acute cortical vascular insults. Scattered   hypodensities in the 
supratentorial white matter are nonspecific but most   compatible with chronic 
small vessel ischemic changes.         Sellar/suprasellar region: No 
abnormalities.   Craniocervical junction: Patent foramen magnum. No Chiari one 
malformation.      Included paranasal sinuses: Mild inflammatory mucosal 
thickening in the left   maxillary sinus.      Incidental findings:    
Atherosclerotic calcifications in the carotid siphons and intradural vertebral 
  arteries. Bilateral lens replacements related to previous cataract surgery.  
       IMPRESSION:       1.  No acute intracranial abnormalities.   2.  Mild 
generalized volume loss.   3.  Moderate supratentorial chronic microvascular 
ischemic changes.   4.  Incidental bilateral sphenoid demineralization with 
lytic changes.      Signed by: Dr. Desiree Green M.D. on 1/10/2018 5:47 AM    
    Dictated By: DESIREE GREEN MD  Electronically Signed By: DESIREE GREEN MD on 01/10/18 0547  Transcribed By: BILL on 01/10/18 0547       COPY TO:   
SCAR GIRON MD

## 2018-03-30 NOTE — DIAGNOSTIC IMAGING REPORT
PROCEDURE:

Frontal and lateral views of the chest.

 

COMPARISON: Portable chest 1/10/2018.

 

INDICATIONS:   WEAKNESS, RIGHT ARM PAIN

     

FINDINGS:

Lines/tubes:  None.

 

Lungs:  The lungs are well inflated and clear. There is no evidence of 

pneumonia or pulmonary edema.

 

Pleura:  There is no pleural effusion or pneumothorax.

 

Heart and mediastinum:  The heart and the mediastinum are normal. 

Atherosclerotic calcifications.

 

Bones:  No acute bony abnormality. Degenerative changes of the thoracic 

spine.

 

IMPRESSION: 

 

No acute radiographic abnormality.

 

Dictated by:  Jax Mohamud M.D. on 3/30/2018 at 11:32     

Electronically approved by:  Jax Mohamud M.D. on 3/30/2018 at 11:32

## 2018-03-30 NOTE — DIAGNOSTIC IMAGING REPORT
EXAMINATION: Head CT 



HISTORY: Weakness, vertigo, pain

COMPARISON: Head CT on 1/10/2018

TECHNIQUE: Multidetector axial images were obtained without contrast from the

foramen magnum to the vertex . The images were reconstructed using brain and

bone algorithms.  Thin section brain images were reformatted into coronal and

sagittal planes.

Intravenous contrast: None. 

Motion/streaking artifact limits the evaluation of the skull base and posterior

cranial fossa.



FINDINGS:



     Parenchyma: 

1.  Unchanged nonspecific moderate confluent white matter chronic microvascular

ischemic changes.

2.  No mass or hemorrhage. No CT evidence of acute territorial vascular insult.



    

     Extra-axial spaces:No abnormal density. No extra-axial fluid collections 

     Brain volume: Normal for age. 

     Ventricles: No hydrocephalus or displacement.  

     Arteries: No density suggestive of thrombus. 

     Dural sinuses: No abnormal density. 

     Extra-axial spaces: No abnormal density. 

     Foramen magnum: No mass, Chiari malformation, or basilar invagination. 

     Sella: No obvious mass.  

     Paranasal/mastoid sinuses: Imaged portions unremarkable. 

     Skull/Scalp: Unchanged previously described bilateral sphenoid

demineralization with lytic changes, no associated soft tissue mass 



IMPRESSION:



1.  No acute intracranial hemorrhage or CT evidence of acute territorial

cortical vascular insults.



2.  Stable moderate chronic microvascular ischemic changes.



3.  Unchanged nonspecific lytic lesions in the sphenoid bone bilaterally

compared to head CT on 1/10/2018. Correlation with past medical history and/or

bone scan is recommended for further evaluation if clinically indicated.



Signed by: Dr. Sheridan Hernandez M.D. on 3/30/2018 10:58 AM

## 2018-03-31 ENCOUNTER — HOSPITAL ENCOUNTER (INPATIENT)
Dept: HOSPITAL 88 - ER | Age: 78
LOS: 4 days | Discharge: HOME | DRG: 853 | End: 2018-04-04
Attending: INTERNAL MEDICINE | Admitting: INTERNAL MEDICINE
Payer: MEDICARE

## 2018-03-31 VITALS — BODY MASS INDEX: 23.04 KG/M2 | HEIGHT: 62 IN | WEIGHT: 125.19 LBS

## 2018-03-31 DIAGNOSIS — E87.6: ICD-10-CM

## 2018-03-31 DIAGNOSIS — T82.524A: ICD-10-CM

## 2018-03-31 DIAGNOSIS — B96.20: ICD-10-CM

## 2018-03-31 DIAGNOSIS — N30.00: ICD-10-CM

## 2018-03-31 DIAGNOSIS — D64.9: ICD-10-CM

## 2018-03-31 DIAGNOSIS — I21.4: ICD-10-CM

## 2018-03-31 DIAGNOSIS — A41.9: Primary | ICD-10-CM

## 2018-03-31 DIAGNOSIS — I25.10: ICD-10-CM

## 2018-03-31 DIAGNOSIS — R53.1: ICD-10-CM

## 2018-03-31 DIAGNOSIS — E83.42: ICD-10-CM
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
## 2018-03-31 DIAGNOSIS — I95.9: ICD-10-CM

## 2018-03-31 DIAGNOSIS — R65.21: ICD-10-CM

## 2018-03-31 DIAGNOSIS — I48.91: ICD-10-CM

## 2018-03-31 LAB
ALBUMIN SERPL-MCNC: 2.6 G/DL (ref 3.5–5)
ALBUMIN/GLOB SERPL: 0.8 {RATIO} (ref 0.8–2)
ALP SERPL-CCNC: 109 IU/L (ref 40–150)
ALT SERPL-CCNC: 8 IU/L (ref 0–55)
ANION GAP SERPL CALC-SCNC: 11.3 MMOL/L (ref 8–16)
ANION GAP SERPL CALC-SCNC: 14 MMOL/L (ref 8–16)
ANISOCYTOSIS BLD QL SMEAR: SLIGHT
BACTERIA URNS QL MICRO: (no result) /HPF
BASOPHILS # BLD AUTO: 0 10*3/UL (ref 0–0.1)
BASOPHILS # BLD AUTO: 0.1 10*3/UL (ref 0–0.1)
BASOPHILS NFR BLD AUTO: 0.5 % (ref 0–1)
BASOPHILS NFR BLD AUTO: 0.6 % (ref 0–1)
BILIRUB UR QL: NEGATIVE
BUN SERPL-MCNC: 11 MG/DL (ref 7–26)
BUN SERPL-MCNC: 9 MG/DL (ref 7–26)
BUN/CREAT SERPL: 14 (ref 6–25)
BUN/CREAT SERPL: 15 (ref 6–25)
CALCIUM SERPL-MCNC: 7.3 MG/DL (ref 8.4–10.2)
CALCIUM SERPL-MCNC: 8.2 MG/DL (ref 8.4–10.2)
CHLORIDE SERPL-SCNC: 109 MMOL/L (ref 98–107)
CHLORIDE SERPL-SCNC: 112 MMOL/L (ref 98–107)
CK MB SERPL-MCNC: 2.2 NG/ML (ref 0–5)
CK SERPL-CCNC: 135 IU/L (ref 29–168)
CK SERPL-CCNC: 66 IU/L (ref 29–168)
CLARITY UR: (no result)
CO2 SERPL-SCNC: 16 MMOL/L (ref 22–29)
CO2 SERPL-SCNC: 17 MMOL/L (ref 22–29)
COLOR UR: YELLOW
DEPRECATED APTT PLAS QN: 30.9 SECONDS (ref 23.8–35.5)
DEPRECATED APTT PLAS QN: 33.2 SECONDS (ref 23.8–35.5)
DEPRECATED INR PLAS: 1.22
DEPRECATED INR PLAS: 1.33
DEPRECATED NEUTROPHILS # BLD AUTO: 11 10*3/UL (ref 2.1–6.9)
DEPRECATED NEUTROPHILS # BLD AUTO: 6.5 10*3/UL (ref 2.1–6.9)
DEPRECATED RBC URNS MANUAL-ACNC: (no result) /HPF (ref 0–5)
EGFRCR SERPLBLD CKD-EPI 2021: > 60 ML/MIN (ref 60–?)
EGFRCR SERPLBLD CKD-EPI 2021: > 60 ML/MIN (ref 60–?)
EOSINOPHIL # BLD AUTO: 0 10*3/UL (ref 0–0.4)
EOSINOPHIL # BLD AUTO: 0 10*3/UL (ref 0–0.4)
EOSINOPHIL NFR BLD AUTO: 0.1 % (ref 0–6)
EOSINOPHIL NFR BLD AUTO: 0.1 % (ref 0–6)
EPI CELLS URNS QL MICRO: (no result) /LPF
ERYTHROCYTE [DISTWIDTH] IN CORD BLOOD: 16.3 % (ref 11.7–14.4)
ERYTHROCYTE [DISTWIDTH] IN CORD BLOOD: 16.5 % (ref 11.7–14.4)
GLOBULIN PLAS-MCNC: 3.4 G/DL (ref 2.3–3.5)
GLUCOSE SERPLBLD-MCNC: 171 MG/DL (ref 74–118)
GLUCOSE SERPLBLD-MCNC: 206 MG/DL (ref 74–118)
HCT VFR BLD AUTO: 26.2 % (ref 34.2–44.1)
HCT VFR BLD AUTO: 30.8 % (ref 34.2–44.1)
HGB BLD-MCNC: 10.1 G/DL (ref 12–16)
HGB BLD-MCNC: 8.2 G/DL (ref 12–16)
KETONES UR QL STRIP.AUTO: NEGATIVE
LEUKOCYTE ESTERASE UR QL STRIP.AUTO: (no result)
LYMPHOCYTES # BLD: 0.4 10*3/UL (ref 1–3.2)
LYMPHOCYTES # BLD: 0.9 10*3/UL (ref 1–3.2)
LYMPHOCYTES NFR BLD AUTO: 11.3 % (ref 18–39.1)
LYMPHOCYTES NFR BLD AUTO: 3.5 % (ref 18–39.1)
LYMPHOCYTES NFR BLD MANUAL: 4 % (ref 19–48)
MAGNESIUM SERPL-MCNC: 2.3 MG/DL (ref 1.3–2.1)
MCH RBC QN AUTO: 27.6 PG (ref 28–32)
MCH RBC QN AUTO: 27.8 PG (ref 28–32)
MCHC RBC AUTO-ENTMCNC: 31.3 G/DL (ref 31–35)
MCHC RBC AUTO-ENTMCNC: 32.8 G/DL (ref 31–35)
MCV RBC AUTO: 84.2 FL (ref 81–99)
MCV RBC AUTO: 88.8 FL (ref 81–99)
MONOCYTES # BLD AUTO: 0.7 10*3/UL (ref 0.2–0.8)
MONOCYTES # BLD AUTO: 0.9 10*3/UL (ref 0.2–0.8)
MONOCYTES NFR BLD AUTO: 7.2 % (ref 4.4–11.3)
MONOCYTES NFR BLD AUTO: 8 % (ref 4.4–11.3)
MONOCYTES NFR BLD MANUAL: 2 % (ref 3.4–9)
NEUTS BAND NFR BLD MANUAL: 14 %
NEUTS SEG NFR BLD AUTO: 79.6 % (ref 38.7–80)
NEUTS SEG NFR BLD AUTO: 88 % (ref 38.7–80)
NEUTS SEG NFR BLD MANUAL: 80 % (ref 40–74)
NITRITE UR QL STRIP.AUTO: POSITIVE
PLAT MORPH BLD: NORMAL
PLATELET # BLD AUTO: 238 X10E3/UL (ref 140–360)
PLATELET # BLD AUTO: 286 X10E3/UL (ref 140–360)
PLATELET # BLD EST: ADEQUATE 10*3/UL
POTASSIUM SERPL-SCNC: 3 MMOL/L (ref 3.5–5.1)
POTASSIUM SERPL-SCNC: 3.3 MMOL/L (ref 3.5–5.1)
PROT UR QL STRIP.AUTO: (no result)
PROTHROMBIN TIME: 14.5 SECONDS (ref 11.9–14.5)
PROTHROMBIN TIME: 15.5 SECONDS (ref 11.9–14.5)
RBC # BLD AUTO: 2.95 X10E6/UL (ref 3.6–5.1)
RBC # BLD AUTO: 3.66 X10E6/UL (ref 3.6–5.1)
RBC MORPH BLD: NORMAL
SODIUM SERPL-SCNC: 136 MMOL/L (ref 136–145)
SODIUM SERPL-SCNC: 137 MMOL/L (ref 136–145)
SP GR UR STRIP: 1.01 (ref 1.01–1.02)
UROBILINOGEN UR STRIP-MCNC: 0.2 MG/DL (ref 0.2–1)
WBC #/AREA URNS HPF: >50 /HPF (ref 0–5)

## 2018-03-31 PROCEDURE — 74470 X-RAY EXAM OF KIDNEY LESION: CPT

## 2018-03-31 PROCEDURE — 36580 REPLACE CVAD CATH: CPT

## 2018-03-31 PROCEDURE — 86900 BLOOD TYPING SEROLOGIC ABO: CPT

## 2018-03-31 PROCEDURE — 96365 THER/PROPH/DIAG IV INF INIT: CPT

## 2018-03-31 PROCEDURE — 80048 BASIC METABOLIC PNL TOTAL CA: CPT

## 2018-03-31 PROCEDURE — 87186 SC STD MICRODIL/AGAR DIL: CPT

## 2018-03-31 PROCEDURE — 93005 ELECTROCARDIOGRAM TRACING: CPT

## 2018-03-31 PROCEDURE — 84484 ASSAY OF TROPONIN QUANT: CPT

## 2018-03-31 PROCEDURE — 83605 ASSAY OF LACTIC ACID: CPT

## 2018-03-31 PROCEDURE — 70450 CT HEAD/BRAIN W/O DYE: CPT

## 2018-03-31 PROCEDURE — 71045 X-RAY EXAM CHEST 1 VIEW: CPT

## 2018-03-31 PROCEDURE — 80053 COMPREHEN METABOLIC PANEL: CPT

## 2018-03-31 PROCEDURE — 85610 PROTHROMBIN TIME: CPT

## 2018-03-31 PROCEDURE — 85730 THROMBOPLASTIN TIME PARTIAL: CPT

## 2018-03-31 PROCEDURE — 82550 ASSAY OF CK (CPK): CPT

## 2018-03-31 PROCEDURE — 96374 THER/PROPH/DIAG INJ IV PUSH: CPT

## 2018-03-31 PROCEDURE — 86850 RBC ANTIBODY SCREEN: CPT

## 2018-03-31 PROCEDURE — 99284 EMERGENCY DEPT VISIT MOD MDM: CPT

## 2018-03-31 PROCEDURE — 81001 URINALYSIS AUTO W/SCOPE: CPT

## 2018-03-31 PROCEDURE — 97139 UNLISTED THERAPEUTIC PX: CPT

## 2018-03-31 PROCEDURE — 86920 COMPATIBILITY TEST SPIN: CPT

## 2018-03-31 PROCEDURE — 87400 INFLUENZA A/B EACH AG IA: CPT

## 2018-03-31 PROCEDURE — 85025 COMPLETE CBC W/AUTO DIFF WBC: CPT

## 2018-03-31 PROCEDURE — 96360 HYDRATION IV INFUSION INIT: CPT

## 2018-03-31 PROCEDURE — 84443 ASSAY THYROID STIM HORMONE: CPT

## 2018-03-31 PROCEDURE — 82553 CREATINE MB FRACTION: CPT

## 2018-03-31 PROCEDURE — 36415 COLL VENOUS BLD VENIPUNCTURE: CPT

## 2018-03-31 PROCEDURE — 87040 BLOOD CULTURE FOR BACTERIA: CPT

## 2018-03-31 PROCEDURE — 02HV33Z INSERTION OF INFUSION DEVICE INTO SUPERIOR VENA CAVA, PERCUTANEOUS APPROACH: ICD-10-PCS | Performed by: EMERGENCY MEDICINE

## 2018-03-31 PROCEDURE — 87086 URINE CULTURE/COLONY COUNT: CPT

## 2018-03-31 PROCEDURE — 36555 INSERT NON-TUNNEL CV CATH: CPT

## 2018-03-31 PROCEDURE — 84100 ASSAY OF PHOSPHORUS: CPT

## 2018-03-31 PROCEDURE — 83735 ASSAY OF MAGNESIUM: CPT

## 2018-03-31 RX ADMIN — SODIUM CHLORIDE SCH GM: 9 INJECTION, SOLUTION INTRAVENOUS at 20:33

## 2018-03-31 NOTE — DIAGNOSTIC IMAGING REPORT
EXAMINATION: Head CT 



HISTORY: Status post fall, dizziness, weakness, fever

COMPARISON: Head CT on 3/20/2018

TECHNIQUE: Multidetector axial images were obtained without contrast from the

foramen magnum to the vertex . The images were reconstructed using brain and

bone algorithms.  Thin section brain images were reformatted into coronal and

sagittal planes.

Intravenous contrast: None. 

Motion/streaking artifact limits the evaluation of the skull base and posterior

cranial fossa.



FINDINGS:



     Parenchyma: 

1.  Persistent moderate confluent chronic microvascular ischemic changes in the

supratentorial white matter.

2.  No mass or hemorrhage. No CT evidence of acute territorial vascular insult.



    

     Extra-axial spaces:No abnormal density. No extra-axial fluid collections 

     Brain volume: Normal for age. 

     Ventricles: No hydrocephalus or displacement.  

     Arteries: No density suggestive of thrombus. 

     Dural sinuses: No abnormal density. 

     Extra-axial spaces: No abnormal density. 

     Foramen magnum: No mass, Chiari malformation, or basilar invagination. 

     Sella: No obvious mass.  

     Paranasal/mastoid sinuses: Imaged portions unremarkable. 

     Skull/Scalp: Unchanged previously described bilateral sphenoid

demineralization with lytic changes, no associated soft tissue mass 

 



IMPRESSION:



1.  No acute intracranial hemorrhage or cortical infarct.



2.  Moderate chronic microvascular ischemic changes, stable when compared to

recent head CTs on 3/30/2018 and 1/10/2018



Signed by: Dr. Sheridan Hernandez M.D. on 3/31/2018 7:22 PM

## 2018-04-01 VITALS — SYSTOLIC BLOOD PRESSURE: 92 MMHG | DIASTOLIC BLOOD PRESSURE: 59 MMHG

## 2018-04-01 VITALS — DIASTOLIC BLOOD PRESSURE: 57 MMHG | SYSTOLIC BLOOD PRESSURE: 94 MMHG

## 2018-04-01 VITALS — SYSTOLIC BLOOD PRESSURE: 104 MMHG | DIASTOLIC BLOOD PRESSURE: 53 MMHG

## 2018-04-01 VITALS — DIASTOLIC BLOOD PRESSURE: 64 MMHG | SYSTOLIC BLOOD PRESSURE: 104 MMHG

## 2018-04-01 VITALS — DIASTOLIC BLOOD PRESSURE: 59 MMHG | SYSTOLIC BLOOD PRESSURE: 100 MMHG

## 2018-04-01 VITALS — SYSTOLIC BLOOD PRESSURE: 118 MMHG | DIASTOLIC BLOOD PRESSURE: 68 MMHG

## 2018-04-01 VITALS — SYSTOLIC BLOOD PRESSURE: 95 MMHG | DIASTOLIC BLOOD PRESSURE: 55 MMHG

## 2018-04-01 VITALS — DIASTOLIC BLOOD PRESSURE: 65 MMHG | SYSTOLIC BLOOD PRESSURE: 120 MMHG

## 2018-04-01 VITALS — SYSTOLIC BLOOD PRESSURE: 91 MMHG | DIASTOLIC BLOOD PRESSURE: 51 MMHG

## 2018-04-01 VITALS — SYSTOLIC BLOOD PRESSURE: 112 MMHG | DIASTOLIC BLOOD PRESSURE: 58 MMHG

## 2018-04-01 VITALS — DIASTOLIC BLOOD PRESSURE: 65 MMHG | SYSTOLIC BLOOD PRESSURE: 82 MMHG

## 2018-04-01 VITALS — DIASTOLIC BLOOD PRESSURE: 67 MMHG | SYSTOLIC BLOOD PRESSURE: 121 MMHG

## 2018-04-01 VITALS — DIASTOLIC BLOOD PRESSURE: 66 MMHG | SYSTOLIC BLOOD PRESSURE: 117 MMHG

## 2018-04-01 VITALS — DIASTOLIC BLOOD PRESSURE: 58 MMHG | SYSTOLIC BLOOD PRESSURE: 104 MMHG

## 2018-04-01 VITALS — DIASTOLIC BLOOD PRESSURE: 56 MMHG | SYSTOLIC BLOOD PRESSURE: 108 MMHG

## 2018-04-01 VITALS — DIASTOLIC BLOOD PRESSURE: 53 MMHG | SYSTOLIC BLOOD PRESSURE: 91 MMHG

## 2018-04-01 VITALS — DIASTOLIC BLOOD PRESSURE: 56 MMHG | SYSTOLIC BLOOD PRESSURE: 99 MMHG

## 2018-04-01 VITALS — DIASTOLIC BLOOD PRESSURE: 60 MMHG | SYSTOLIC BLOOD PRESSURE: 105 MMHG

## 2018-04-01 VITALS — DIASTOLIC BLOOD PRESSURE: 56 MMHG | SYSTOLIC BLOOD PRESSURE: 95 MMHG

## 2018-04-01 VITALS — SYSTOLIC BLOOD PRESSURE: 97 MMHG | DIASTOLIC BLOOD PRESSURE: 54 MMHG

## 2018-04-01 VITALS — DIASTOLIC BLOOD PRESSURE: 70 MMHG | SYSTOLIC BLOOD PRESSURE: 108 MMHG

## 2018-04-01 VITALS — SYSTOLIC BLOOD PRESSURE: 147 MMHG | DIASTOLIC BLOOD PRESSURE: 113 MMHG

## 2018-04-01 VITALS — SYSTOLIC BLOOD PRESSURE: 99 MMHG | DIASTOLIC BLOOD PRESSURE: 52 MMHG

## 2018-04-01 VITALS — DIASTOLIC BLOOD PRESSURE: 54 MMHG | SYSTOLIC BLOOD PRESSURE: 91 MMHG

## 2018-04-01 VITALS — SYSTOLIC BLOOD PRESSURE: 126 MMHG | DIASTOLIC BLOOD PRESSURE: 90 MMHG

## 2018-04-01 VITALS — DIASTOLIC BLOOD PRESSURE: 83 MMHG | SYSTOLIC BLOOD PRESSURE: 147 MMHG

## 2018-04-01 VITALS — DIASTOLIC BLOOD PRESSURE: 51 MMHG | SYSTOLIC BLOOD PRESSURE: 94 MMHG

## 2018-04-01 VITALS — DIASTOLIC BLOOD PRESSURE: 86 MMHG | SYSTOLIC BLOOD PRESSURE: 137 MMHG

## 2018-04-01 VITALS — DIASTOLIC BLOOD PRESSURE: 82 MMHG | SYSTOLIC BLOOD PRESSURE: 154 MMHG

## 2018-04-01 VITALS — DIASTOLIC BLOOD PRESSURE: 63 MMHG | SYSTOLIC BLOOD PRESSURE: 118 MMHG

## 2018-04-01 VITALS — DIASTOLIC BLOOD PRESSURE: 67 MMHG | SYSTOLIC BLOOD PRESSURE: 118 MMHG

## 2018-04-01 VITALS — SYSTOLIC BLOOD PRESSURE: 125 MMHG | DIASTOLIC BLOOD PRESSURE: 65 MMHG

## 2018-04-01 VITALS — DIASTOLIC BLOOD PRESSURE: 65 MMHG | SYSTOLIC BLOOD PRESSURE: 102 MMHG

## 2018-04-01 VITALS — DIASTOLIC BLOOD PRESSURE: 72 MMHG | SYSTOLIC BLOOD PRESSURE: 126 MMHG

## 2018-04-01 VITALS — DIASTOLIC BLOOD PRESSURE: 56 MMHG | SYSTOLIC BLOOD PRESSURE: 93 MMHG

## 2018-04-01 VITALS — DIASTOLIC BLOOD PRESSURE: 69 MMHG | SYSTOLIC BLOOD PRESSURE: 117 MMHG

## 2018-04-01 VITALS — DIASTOLIC BLOOD PRESSURE: 67 MMHG | SYSTOLIC BLOOD PRESSURE: 123 MMHG

## 2018-04-01 VITALS — SYSTOLIC BLOOD PRESSURE: 85 MMHG | DIASTOLIC BLOOD PRESSURE: 52 MMHG

## 2018-04-01 VITALS — SYSTOLIC BLOOD PRESSURE: 95 MMHG | DIASTOLIC BLOOD PRESSURE: 74 MMHG

## 2018-04-01 VITALS — SYSTOLIC BLOOD PRESSURE: 108 MMHG | DIASTOLIC BLOOD PRESSURE: 59 MMHG

## 2018-04-01 VITALS — SYSTOLIC BLOOD PRESSURE: 135 MMHG | DIASTOLIC BLOOD PRESSURE: 76 MMHG

## 2018-04-01 VITALS — DIASTOLIC BLOOD PRESSURE: 57 MMHG | SYSTOLIC BLOOD PRESSURE: 100 MMHG

## 2018-04-01 VITALS — SYSTOLIC BLOOD PRESSURE: 118 MMHG | DIASTOLIC BLOOD PRESSURE: 67 MMHG

## 2018-04-01 VITALS — DIASTOLIC BLOOD PRESSURE: 72 MMHG | SYSTOLIC BLOOD PRESSURE: 143 MMHG

## 2018-04-01 VITALS — SYSTOLIC BLOOD PRESSURE: 128 MMHG | DIASTOLIC BLOOD PRESSURE: 71 MMHG

## 2018-04-01 VITALS — SYSTOLIC BLOOD PRESSURE: 93 MMHG | DIASTOLIC BLOOD PRESSURE: 54 MMHG

## 2018-04-01 VITALS — SYSTOLIC BLOOD PRESSURE: 80 MMHG | DIASTOLIC BLOOD PRESSURE: 59 MMHG

## 2018-04-01 VITALS — SYSTOLIC BLOOD PRESSURE: 99 MMHG | DIASTOLIC BLOOD PRESSURE: 57 MMHG

## 2018-04-01 VITALS — DIASTOLIC BLOOD PRESSURE: 64 MMHG | SYSTOLIC BLOOD PRESSURE: 102 MMHG

## 2018-04-01 LAB
ALBUMIN SERPL-MCNC: 2.2 G/DL (ref 3.5–5)
ALBUMIN/GLOB SERPL: 0.9 {RATIO} (ref 0.8–2)
ALP SERPL-CCNC: 81 IU/L (ref 40–150)
ALT SERPL-CCNC: 8 IU/L (ref 0–55)
ANION GAP SERPL CALC-SCNC: 8.8 MMOL/L (ref 8–16)
BASOPHILS # BLD AUTO: 0 10*3/UL (ref 0–0.1)
BASOPHILS NFR BLD AUTO: 0.5 % (ref 0–1)
BUN SERPL-MCNC: 7 MG/DL (ref 7–26)
BUN/CREAT SERPL: 13 (ref 6–25)
CALCIUM SERPL-MCNC: 7.1 MG/DL (ref 8.4–10.2)
CHLORIDE SERPL-SCNC: 115 MMOL/L (ref 98–107)
CK MB SERPL-MCNC: 3.9 NG/ML (ref 0–5)
CK MB SERPL-MCNC: 5.5 NG/ML (ref 0–5)
CK MB SERPL-MCNC: 6.6 NG/ML (ref 0–5)
CK SERPL-CCNC: 180 IU/L (ref 29–168)
CK SERPL-CCNC: 201 IU/L (ref 29–168)
CO2 SERPL-SCNC: 16 MMOL/L (ref 22–29)
DEPRECATED NEUTROPHILS # BLD AUTO: 5.3 10*3/UL (ref 2.1–6.9)
EGFRCR SERPLBLD CKD-EPI 2021: > 60 ML/MIN (ref 60–?)
EOSINOPHIL # BLD AUTO: 0.1 10*3/UL (ref 0–0.4)
EOSINOPHIL NFR BLD AUTO: 1.2 % (ref 0–6)
EOSINOPHIL NFR BLD MANUAL: 2 % (ref 0–7)
ERYTHROCYTE [DISTWIDTH] IN CORD BLOOD: 16.6 % (ref 11.7–14.4)
GLOBULIN PLAS-MCNC: 2.5 G/DL (ref 2.3–3.5)
GLUCOSE SERPLBLD-MCNC: 109 MG/DL (ref 74–118)
HCT VFR BLD AUTO: 28.9 % (ref 34.2–44.1)
HGB BLD-MCNC: 9.2 G/DL (ref 12–16)
LYMPHOCYTES # BLD: 1.2 10*3/UL (ref 1–3.2)
LYMPHOCYTES NFR BLD AUTO: 15.8 % (ref 18–39.1)
LYMPHOCYTES NFR BLD MANUAL: 19 % (ref 19–48)
MCH RBC QN AUTO: 27.8 PG (ref 28–32)
MCHC RBC AUTO-ENTMCNC: 31.8 G/DL (ref 31–35)
MCV RBC AUTO: 87.3 FL (ref 81–99)
MONOCYTES # BLD AUTO: 0.8 10*3/UL (ref 0.2–0.8)
MONOCYTES NFR BLD AUTO: 10.3 % (ref 4.4–11.3)
MONOCYTES NFR BLD MANUAL: 5 % (ref 3.4–9)
NEUTS BAND NFR BLD MANUAL: 24 %
NEUTS SEG NFR BLD AUTO: 71.1 % (ref 38.7–80)
NEUTS SEG NFR BLD MANUAL: 50 % (ref 40–74)
PLAT MORPH BLD: NORMAL
PLATELET # BLD AUTO: 267 X10E3/UL (ref 140–360)
PLATELET # BLD EST: ADEQUATE 10*3/UL
POTASSIUM SERPL-SCNC: 3.8 MMOL/L (ref 3.5–5.1)
RBC # BLD AUTO: 3.31 X10E6/UL (ref 3.6–5.1)
RBC MORPH BLD: NORMAL
SODIUM SERPL-SCNC: 136 MMOL/L (ref 136–145)
TSH SERPL DL<=0.005 MIU/L-ACNC: 1.92 UIU/ML (ref 0.35–4.94)

## 2018-04-01 RX ADMIN — SODIUM CHLORIDE SCH MG: 900 INJECTION INTRAVENOUS at 08:22

## 2018-04-01 RX ADMIN — SODIUM CHLORIDE SCH GM: 9 INJECTION, SOLUTION INTRAVENOUS at 20:45

## 2018-04-01 RX ADMIN — SODIUM CHLORIDE SCH GM: 9 INJECTION, SOLUTION INTRAVENOUS at 08:22

## 2018-04-01 NOTE — DIAGNOSTIC IMAGING REPORT
CHEST SINGLE (PORTABLE), 4/1/2018 12:54 AM



Technique: CHEST SINGLE (PORTABLE)

Comparison: Previous day

Clinical history: \S\RIGHT SC CENTRAL LINE PLACEMENT



Findings:

See Impression



Impression:

1. Lines/Tubes: Right subclavian central venous catheter placement with tip

overlying the jugular vein; recommend repositioning.

2. Otherwise stable chest. No pneumothorax.



Signed by: Dr Ansley Lu MD on 4/1/2018 1:30 AM

## 2018-04-01 NOTE — HISTORY AND PHYSICAL
PRIMARY CARE PHYSICIAN:  Dr. Barrios. 



CHIEF COMPLAINT:  Fever, low blood pressure, nausea and vomiting. 



HISTORY OF PRESENT ILLNESS:  This is a 77-year-old woman with a history of 

E. coli urinary tract infection now developing fever, nausea and vomiting 

and urinary discomfort and was brought to the hospital.  Here she was found 

to have urinary tract infection with low blood pressure, signs of sepsis.  

She is admitted to the ICU for further management.  Denies any chest pain 

or shortness of breath. 



PAST MEDICAL HISTORY:  E. coli urinary tract infection, severe hypokalemia, 

acute enteritis, recurrent intestinal bleeding, severe sepsis, atrial 

fibrillation, diarrhea, normocytic anemia,  non-ST elevation MI, 

depression, Clostridium difficile colitis, iron deficiency anemia. 



PAST SURGICAL HISTORY:  None. 



ALLERGIES:  PER ELECTRONIC MEDICAL RECORDS. 



FAMILY HISTORY/SOCIAL HISTORY:  History of alcohol use but quit.  No 

illicits, no cigarettes.  She lives alone and she is .  



MEDICATIONS:  Per electronic medical records. 



REVIEW OF SYSTEMS:  Denies any chest pain. 



PHYSICAL EXAMINATION 

VITAL SIGNS:  Reviewed.  

GENERAL APPEARANCE:  A tired-appearing woman resting in the bed. 

HEENT:  Anicteric.  Pupils responsive to light.  No oral lesions.  

CARDIOVASCULAR:  She has a 3/6 systolic ejection murmur. 

LUNGS:  Moderate breath sounds, 

ABDOMEN:  Soft and nontender.  Nondistended.

EXTREMITIES: There is no edema or calf tenderness. 

NEUROLOGIC: Alert and oriented x3.  Moving all extremities.  

SKIN:  Dry. 

PSYCHIATRIC: Normal affect. 



LABS:  Reviewed. 



MEDICATIONS:   Reviewed. 



ASSESSMENT AND PLAN:  A 77-year-old woman. 

1. Sepsis.  This is due to urinary tract infection.  Continue 

antibiotics and fluids. 

2. Urinary tract infection.  Continue antibiotics and she has received 

fluid. 

3. Coronary artery disease.  Continue home medication.  Will continue 

aspirin. 

4. Atrial fibrillation.  Rate currently controlled.  Monitor by 

telemetry. 

5. Normocytic anemia.  Mild to moderate.  Will continue to follow.  

6. Hypomagnesemia.  Replace and recheck. 

7. Hypokalemia.  Replace potassium. 

8. Elevated troponin.  This seems to be non-evolving.  Will follow. 

9. Prophylaxis:  Use proton pump inhibitor and Lovenox. 



DISPOSITION:  Monitor closely in the ICU.  She can be transitioned out to 

the floor with telemetry.  Continue vancomycin and ceftriaxone.  



Critical care time more than 35 minutes.  





DD:  04/01/2018 18:24

DT:  04/01/2018 18:51

Job#:  [id number]

## 2018-04-02 VITALS — SYSTOLIC BLOOD PRESSURE: 126 MMHG | DIASTOLIC BLOOD PRESSURE: 62 MMHG

## 2018-04-02 VITALS — SYSTOLIC BLOOD PRESSURE: 145 MMHG | DIASTOLIC BLOOD PRESSURE: 65 MMHG

## 2018-04-02 VITALS — DIASTOLIC BLOOD PRESSURE: 72 MMHG | SYSTOLIC BLOOD PRESSURE: 137 MMHG

## 2018-04-02 VITALS — SYSTOLIC BLOOD PRESSURE: 144 MMHG | DIASTOLIC BLOOD PRESSURE: 72 MMHG

## 2018-04-02 VITALS — SYSTOLIC BLOOD PRESSURE: 147 MMHG | DIASTOLIC BLOOD PRESSURE: 64 MMHG

## 2018-04-02 VITALS — SYSTOLIC BLOOD PRESSURE: 128 MMHG | DIASTOLIC BLOOD PRESSURE: 62 MMHG

## 2018-04-02 LAB
ANION GAP SERPL CALC-SCNC: 11.1 MMOL/L (ref 8–16)
BUN SERPL-MCNC: 5 MG/DL (ref 7–26)
BUN/CREAT SERPL: 8 (ref 6–25)
CALCIUM SERPL-MCNC: 7.8 MG/DL (ref 8.4–10.2)
CHLORIDE SERPL-SCNC: 111 MMOL/L (ref 98–107)
CK MB SERPL-MCNC: 3.3 NG/ML (ref 0–5)
CK SERPL-CCNC: 136 IU/L (ref 29–168)
CO2 SERPL-SCNC: 21 MMOL/L (ref 22–29)
DEPRECATED PHOSPHATE SERPL-MCNC: 2.5 MG/DL (ref 2.3–4.7)
EGFRCR SERPLBLD CKD-EPI 2021: > 60 ML/MIN (ref 60–?)
GLUCOSE SERPLBLD-MCNC: 111 MG/DL (ref 74–118)
MAGNESIUM SERPL-MCNC: 2 MG/DL (ref 1.3–2.1)
POTASSIUM SERPL-SCNC: 4.1 MMOL/L (ref 3.5–5.1)
SODIUM SERPL-SCNC: 139 MMOL/L (ref 136–145)

## 2018-04-02 PROCEDURE — 02HV33Z INSERTION OF INFUSION DEVICE INTO SUPERIOR VENA CAVA, PERCUTANEOUS APPROACH: ICD-10-PCS

## 2018-04-02 PROCEDURE — 02PY33Z REMOVAL OF INFUSION DEVICE FROM GREAT VESSEL, PERCUTANEOUS APPROACH: ICD-10-PCS

## 2018-04-02 RX ADMIN — TEMAZEPAM PRN MG: 15 CAPSULE ORAL at 22:38

## 2018-04-02 RX ADMIN — SODIUM CHLORIDE SCH GM: 9 INJECTION, SOLUTION INTRAVENOUS at 21:31

## 2018-04-02 RX ADMIN — SODIUM CHLORIDE SCH GM: 9 INJECTION, SOLUTION INTRAVENOUS at 08:00

## 2018-04-02 RX ADMIN — SODIUM CHLORIDE SCH MG: 900 INJECTION INTRAVENOUS at 09:00

## 2018-04-02 RX ADMIN — ENOXAPARIN SODIUM SCH MG: 40 INJECTION SUBCUTANEOUS at 17:00

## 2018-04-02 NOTE — DIAGNOSTIC IMAGING REPORT
Non-tunneled Central Venous Catheter Revision 4/2/2018



Pre-Procedure Diagnosis: Malpositioned right subclavian central venous

catheter.

Post-procedure Diagnosis:Malpositioned right subclavian central venous catheter



: GUSTAVO Stout

Assistant: None

Sedation: None. 1% lidocaine local anesthesia.



Radiation Dose:1.12 mGy (cumulative air kerma)

Fluoroscopy time:0.3 minutes



Estimate blood loss: <5 mL Blood administered: None

Complications: None

Implants/Grafts: 16 cm 7-Nepali 3 lumen CVC

Specimen: None





Procedure:

Informed consent was obtained and the patient positioned supine in the

fluoroscopy suite. The indwelling subclavian central venous catheter was

prepped and draped in standard fashion.



The indwelling catheter was removed over an 035 wire. The wire was repositioned

from the right internal jugular vein to the superior vena cava under

fluoroscopic guidance. A new 7-Nepali 16 cm 3 lm central venous catheter was

positioned at the low SVC/superior atrial-caval junction under fluoroscopy.



At the end of the procedure the catheter was flushed, secured to the skin and a

sterile dressing applied. The patient tolerated the procedure well and without

immediate complication.



Findings:

Initial radiograph demonstrated the previously placed subclavian central venous

catheter was malpositioned in the right internal jugular vein.



Impression:

Successful repositioning/exchange of a non-tunneled right subclavian central

venous catheter using fluoroscopic guidance.







This report was generated with voice-recognition technology. Errors in

transcription can occur. Please interpret accordingly and contact a radiologist

if there are any questions regarding the report. 



Signed by: Dr. Oren Stout M.D. on 4/2/2018 10:59 AM

## 2018-04-03 VITALS — DIASTOLIC BLOOD PRESSURE: 78 MMHG | SYSTOLIC BLOOD PRESSURE: 148 MMHG

## 2018-04-03 VITALS — SYSTOLIC BLOOD PRESSURE: 122 MMHG | DIASTOLIC BLOOD PRESSURE: 70 MMHG

## 2018-04-03 VITALS — SYSTOLIC BLOOD PRESSURE: 152 MMHG | DIASTOLIC BLOOD PRESSURE: 72 MMHG

## 2018-04-03 VITALS — DIASTOLIC BLOOD PRESSURE: 74 MMHG | SYSTOLIC BLOOD PRESSURE: 144 MMHG

## 2018-04-03 VITALS — DIASTOLIC BLOOD PRESSURE: 71 MMHG | SYSTOLIC BLOOD PRESSURE: 124 MMHG

## 2018-04-03 VITALS — DIASTOLIC BLOOD PRESSURE: 77 MMHG | SYSTOLIC BLOOD PRESSURE: 152 MMHG

## 2018-04-03 RX ADMIN — SODIUM CHLORIDE SCH MG: 900 INJECTION INTRAVENOUS at 09:00

## 2018-04-03 RX ADMIN — SODIUM CHLORIDE SCH GM: 9 INJECTION, SOLUTION INTRAVENOUS at 20:43

## 2018-04-03 RX ADMIN — ENOXAPARIN SODIUM SCH MG: 40 INJECTION SUBCUTANEOUS at 17:00

## 2018-04-03 RX ADMIN — SODIUM CHLORIDE SCH GM: 9 INJECTION, SOLUTION INTRAVENOUS at 08:00

## 2018-04-03 RX ADMIN — TEMAZEPAM PRN MG: 15 CAPSULE ORAL at 22:42

## 2018-04-03 NOTE — PROGRESS NOTE
DATE:  April 02, 2018 



TIME:  6 a.m.



OVERNIGHT:  No events.



REVIEW OF SYSTEMS:  Denies any dizziness.



PHYSICAL EXAMINATION 

VITAL SIGNS:  Reviewed.

GENERAL:  A tired-appearing woman resting in bed.

HEENT:  Anicteric.  

CARDIOVASCULAR:  Normal S1 and S2.  A 3/6 systolic murmur. 

LUNGS:  Moderate breath sounds.  

ABDOMEN:  Soft, nontender and nondistended.  

EXTREMITIES:  No edema.

SKIN:  Dry.

PSYCHIATRIC:  Flat affect.

NEUROLOGICAL:  Alert and oriented times 3.  



LABS:  Reviewed.



MEDICATIONS:  Reviewed.



ASSESSMENT:  A 77-year-old woman with:

1.  Sepsis.

2.  Urinary tract infection. 

3.  Coronary artery disease.

4.  Atrial fibrillation.

5.  Normocytic anemia.

6.  Hypomagnesemia.

7.  Hypokalemia.

8.  Elevated troponin.



PLAN 

1.  This is not acute coronary syndrome.

2.  Continue antibiotics.

3.  Follow up cultures.

4.  Continue other medications for coronary artery disease and atrial 

fibrillation. 

5.  Continue to follow up blood counts.  









DD:  04/03/2018 07:10

DT:  04/03/2018 07:18

Job#:  [id number]

## 2018-04-03 NOTE — PROGRESS NOTE
DATE:  April 03, 2018 



TIME:  7:10 a.m.



OVERNIGHT:  No events.



REVIEW OF SYSTEMS:  Denies any dizziness.  



PHYSICAL EXAMINATION 

VITAL SIGNS:  Reviewed.

GENERAL:  A tired-appearing woman resting in bed. 

HEENT:  Anicteric.  

CARDIOVASCULAR:  Normal S1 and S2.  A 2-3/6 systolic murmur.  

LUNGS:  Moderate breath sounds.  

ABDOMEN:  Soft and nontender.  

EXTREMITIES:  No edema.

SKIN:  Dry.

PSYCHIATRIC:  Normal affect.



LABS:  Reviewed.



MEDICATIONS:  Reviewed.



ASSESSMENT:  A 77-year-old woman with:

1.  Sepsis.

2.  Urinary tract infection with gram-negative reginald.

3.  Coronary artery disease.

4.  Atrial fibrillation.

5.  Normocytic anemia.

6.  Hypomagnesemia.

7.  Hypokalemia.

8.  Elevated troponin without acute coronary syndrome.



PLAN 

1.  Continue antibiotics.  The patient remains on IV ceftriaxone.

2.  Leukocytosis, resolving.

3.  Gram-negative reginald in the urine.  Awaiting speciation and sensitivity.

4.  Hemoglobin has remained stable.  Continue to follow.

5.  Follow up labs.  

6.  Continue current care.  Discharge planning.  









DD:  04/03/2018 07:11

DT:  04/03/2018 07:23

Job#:  [id number]

## 2018-04-04 VITALS — DIASTOLIC BLOOD PRESSURE: 81 MMHG | SYSTOLIC BLOOD PRESSURE: 162 MMHG

## 2018-04-04 VITALS — SYSTOLIC BLOOD PRESSURE: 152 MMHG | DIASTOLIC BLOOD PRESSURE: 79 MMHG

## 2018-04-04 VITALS — SYSTOLIC BLOOD PRESSURE: 152 MMHG | DIASTOLIC BLOOD PRESSURE: 77 MMHG

## 2018-04-04 VITALS — SYSTOLIC BLOOD PRESSURE: 144 MMHG | DIASTOLIC BLOOD PRESSURE: 70 MMHG

## 2018-04-04 RX ADMIN — SODIUM CHLORIDE SCH MG: 900 INJECTION INTRAVENOUS at 09:00

## 2018-04-04 RX ADMIN — SODIUM CHLORIDE SCH GM: 9 INJECTION, SOLUTION INTRAVENOUS at 08:00

## 2018-04-05 NOTE — DISCHARGE SUMMARY
PRINCIPAL DIAGNOSES 

1.  Sepsis.

2.  Urinary tract infection with Escherichia coli.

3.  Coronary artery disease.

4.  Atrial fibrillation.

5.  Normocytic anemia.

6.  Hypomagnesemia.

7.  Hypokalemia.

8.  Elevated troponin without acute coronary syndrome.



SECONDARY DIAGNOSIS:  Atrial fibrillation.



CHIEF COMPLAINT:  Fever with low blood pressure.



HISTORY OF PRESENT ILLNESS:  This is a 77-year-old woman who developed 

fever and low blood pressure.  Refer to the H and P for further details.



HOSPITAL COURSE:  The patient was found to be septic.  She had a urinary 

tract infection with E. coli treated with antibiotics.  She will be 

discharged home with Keflex for an additional day.  She had coronary artery 

disease and atrial fibrillation treated with medications.  Will continue.  

Blood pressure has improved prior to being discharged home.  She had 

normocytic anemia.  Her hypomagnesemia and hypokalemia were both replaced.  

Her troponin was not evolving, and this did not represent acute coronary 

syndrome.  The patient was subsequently transitioned out of the hospital 

for continued care.



DISCHARGE MEDICATIONS:  Per electronic medical record and include Keflex.



FOLLOWUP:  Primary care doctor in 1 week.



CONDITION ON DISCHARGE:  Stable and improving.



DISCHARGE LOCATION:  Home. 



 



 _________________________________

FELECIA ALLISON MD



DD:  04/05/2018 07:14

DT:  04/05/2018 07:39

Job#:  [id number]

## 2018-05-29 ENCOUNTER — HOSPITAL ENCOUNTER (OUTPATIENT)
Dept: HOSPITAL 88 - ER | Age: 78
Setting detail: OBSERVATION
LOS: 2 days | Discharge: HOME | End: 2018-05-31
Attending: INTERNAL MEDICINE | Admitting: INTERNAL MEDICINE
Payer: MEDICARE

## 2018-05-29 VITALS — DIASTOLIC BLOOD PRESSURE: 78 MMHG | SYSTOLIC BLOOD PRESSURE: 145 MMHG

## 2018-05-29 VITALS — HEIGHT: 62 IN | WEIGHT: 118.56 LBS | BODY MASS INDEX: 21.82 KG/M2

## 2018-05-29 DIAGNOSIS — E78.5: ICD-10-CM

## 2018-05-29 DIAGNOSIS — K52.9: Primary | ICD-10-CM

## 2018-05-29 DIAGNOSIS — Z91.81: ICD-10-CM

## 2018-05-29 DIAGNOSIS — Z87.440: ICD-10-CM

## 2018-05-29 DIAGNOSIS — E87.6: ICD-10-CM

## 2018-05-29 DIAGNOSIS — D50.9: ICD-10-CM

## 2018-05-29 DIAGNOSIS — I10: ICD-10-CM

## 2018-05-29 DIAGNOSIS — I48.91: ICD-10-CM

## 2018-05-29 DIAGNOSIS — Z79.01: ICD-10-CM

## 2018-05-29 LAB
ALBUMIN SERPL-MCNC: 2.7 G/DL (ref 3.5–5)
ALBUMIN/GLOB SERPL: 0.8 {RATIO} (ref 0.8–2)
ALP SERPL-CCNC: 69 IU/L (ref 40–150)
ALT SERPL-CCNC: 7 IU/L (ref 0–55)
ANION GAP SERPL CALC-SCNC: 12.8 MMOL/L (ref 8–16)
BACTERIA URNS QL MICRO: (no result) /HPF
BASOPHILS # BLD AUTO: 0.1 10*3/UL (ref 0–0.1)
BASOPHILS NFR BLD AUTO: 0.8 % (ref 0–1)
BILIRUB UR QL: NEGATIVE
BUN SERPL-MCNC: 12 MG/DL (ref 7–26)
BUN/CREAT SERPL: 16 (ref 6–25)
CALCIUM SERPL-MCNC: 7.9 MG/DL (ref 8.4–10.2)
CHLORIDE SERPL-SCNC: 105 MMOL/L (ref 98–107)
CK MB SERPL-MCNC: 1.1 NG/ML (ref 0–5)
CK SERPL-CCNC: 47 IU/L (ref 29–168)
CLARITY UR: CLEAR
CO2 SERPL-SCNC: 24 MMOL/L (ref 22–29)
COLOR UR: YELLOW
DEPRECATED NEUTROPHILS # BLD AUTO: 4.7 10*3/UL (ref 2.1–6.9)
DEPRECATED RBC URNS MANUAL-ACNC: (no result) /HPF (ref 0–5)
EGFRCR SERPLBLD CKD-EPI 2021: > 60 ML/MIN (ref 60–?)
EOSINOPHIL # BLD AUTO: 0.1 10*3/UL (ref 0–0.4)
EOSINOPHIL NFR BLD AUTO: 0.7 % (ref 0–6)
EPI CELLS URNS QL MICRO: (no result) /LPF
ERYTHROCYTE [DISTWIDTH] IN CORD BLOOD: 16.2 % (ref 11.7–14.4)
GLOBULIN PLAS-MCNC: 3.3 G/DL (ref 2.3–3.5)
GLUCOSE SERPLBLD-MCNC: 90 MG/DL (ref 74–118)
HCT VFR BLD AUTO: 33.4 % (ref 34.2–44.1)
HGB BLD-MCNC: 10.5 G/DL (ref 12–16)
KETONES UR QL STRIP.AUTO: NEGATIVE
LEUKOCYTE ESTERASE UR QL STRIP.AUTO: NEGATIVE
LIPASE SERPL-CCNC: 12 U/L (ref 8–78)
LYMPHOCYTES # BLD: 1.5 10*3/UL (ref 1–3.2)
LYMPHOCYTES NFR BLD AUTO: 21.1 % (ref 18–39.1)
MCH RBC QN AUTO: 26.7 PG (ref 28–32)
MCHC RBC AUTO-ENTMCNC: 31.4 G/DL (ref 31–35)
MCV RBC AUTO: 85 FL (ref 81–99)
MONOCYTES # BLD AUTO: 0.8 10*3/UL (ref 0.2–0.8)
MONOCYTES NFR BLD AUTO: 11.6 % (ref 4.4–11.3)
MUCOUS THREADS URNS QL MICRO: (no result)
NEUTS SEG NFR BLD AUTO: 65.4 % (ref 38.7–80)
NITRITE UR QL STRIP.AUTO: NEGATIVE
PLATELET # BLD AUTO: 320 X10E3/UL (ref 140–360)
POTASSIUM SERPL-SCNC: 2.8 MMOL/L (ref 3.5–5.1)
PROT UR QL STRIP.AUTO: NEGATIVE
RBC # BLD AUTO: 3.93 X10E6/UL (ref 3.6–5.1)
SODIUM SERPL-SCNC: 139 MMOL/L (ref 136–145)
SP GR UR STRIP: 1 (ref 1.01–1.02)
UROBILINOGEN UR STRIP-MCNC: 0.2 MG/DL (ref 0.2–1)
WBC #/AREA URNS HPF: (no result) /HPF (ref 0–5)

## 2018-05-29 PROCEDURE — 84484 ASSAY OF TROPONIN QUANT: CPT

## 2018-05-29 PROCEDURE — 82550 ASSAY OF CK (CPK): CPT

## 2018-05-29 PROCEDURE — 93005 ELECTROCARDIOGRAM TRACING: CPT

## 2018-05-29 PROCEDURE — 87045 FECES CULTURE AEROBIC BACT: CPT

## 2018-05-29 PROCEDURE — 85025 COMPLETE CBC W/AUTO DIFF WBC: CPT

## 2018-05-29 PROCEDURE — 80053 COMPREHEN METABOLIC PANEL: CPT

## 2018-05-29 PROCEDURE — 84132 ASSAY OF SERUM POTASSIUM: CPT

## 2018-05-29 PROCEDURE — 81001 URINALYSIS AUTO W/SCOPE: CPT

## 2018-05-29 PROCEDURE — 83690 ASSAY OF LIPASE: CPT

## 2018-05-29 PROCEDURE — 87328 CRYPTOSPORIDIUM AG IA: CPT

## 2018-05-29 PROCEDURE — 36415 COLL VENOUS BLD VENIPUNCTURE: CPT

## 2018-05-29 PROCEDURE — 74177 CT ABD & PELVIS W/CONTRAST: CPT

## 2018-05-29 PROCEDURE — 97161 PT EVAL LOW COMPLEX 20 MIN: CPT

## 2018-05-29 PROCEDURE — 87177 OVA AND PARASITES SMEARS: CPT

## 2018-05-29 PROCEDURE — 99284 EMERGENCY DEPT VISIT MOD MDM: CPT

## 2018-05-29 PROCEDURE — 87493 C DIFF AMPLIFIED PROBE: CPT

## 2018-05-29 PROCEDURE — 97116 GAIT TRAINING THERAPY: CPT

## 2018-05-29 PROCEDURE — 82553 CREATINE MB FRACTION: CPT

## 2018-05-29 RX ADMIN — SODIUM CHLORIDE SCH MLS/HR: 9 INJECTION, SOLUTION INTRAVENOUS at 23:43

## 2018-05-29 RX ADMIN — SODIUM CHLORIDE SCH MLS/HR: 9 INJECTION, SOLUTION INTRAVENOUS at 16:58

## 2018-05-29 RX ADMIN — METRONIDAZOLE SCH MLS/HR: 500 INJECTION, SOLUTION INTRAVENOUS at 22:54

## 2018-05-29 RX ADMIN — CIPROFLOXACIN SCH MLS/HR: 2 INJECTION, SOLUTION INTRAVENOUS at 21:39

## 2018-05-29 RX ADMIN — TEMAZEPAM PRN MG: 15 CAPSULE ORAL at 23:30

## 2018-05-29 NOTE — CONSULTATION
DATE OF CONSULTATION:  May 29, 2018 



GASTROENTEROLOGY CONSULTATION



REASON FOR CONSULTATION:  Gastroenteritis.



HISTORY OF PRESENT ILLNESS:  Ms. Justice is a pleasant 77-year-old woman 

with a history of chronic ileitis and recurrent gastroenteritis.  She is 

followed with Dr. Wilcox.  She has been accused of having Crohn's disease 

in the past, but most recently says Dr. Wilcox did not think that she 

actually had Crohn's.  She is not on any chronic medications for this.  She 

does report several recent episodes and that she gets better when she is on 

antibiotics.  The abdominal pain is primarily in the right lower 

midquadrant and nonradiating aside for some generalization of nausea and 

vomiting.  She has had diarrhea with multiple episodes.  She has some 

subjective fever.  She has not had any travel or sick exposures nor any 

unusual foods as a trigger.  



PAST MEDICAL HISTORY:  Enteritis, E. coli urinary tract infections, history 

of hyperkalemia, gastrointestinal bleeding, history of atrial fibrillation, 

diarrhea, anemia, coronary artery disease with non-ST-elevation MI, 

depression, history of C. diff, and iron deficiency anemia.  Outpatient 

chart from Dr. Wilcox is reviewed.



SURGICAL HISTORY:  None significant.



MEDICATIONS:  Reviewed.  Please see MAR medication reconciliation form.



ALLERGIES:  REVIEWED.



SOCIAL HISTORY:  Prior alcohol.  No tobacco or illicit substances.  She is 

.



REVIEW OF SYSTEMS:  Reviewed and positive for that mentioned in HPI, as 

well as generalized fatigue, malaise and weakness.   

PHYSICAL EXAMINATION 

GENERAL:  Pleasant, alert and in no acute distress.  Laying in bed.   

HEENT:  Pupils equal, round and reactive to light. 

NECK:  Supple.

LUNGS:  Clear.

CARDIOVASCULAR:  S1 and S2.  She has a systolic ejection murmur.

ABDOMEN:  Soft.  Is tender in the right lower quadrant.  Rest of the 

abdomen reviewed.  No guarding or mass.   

EXTREMITIES:  No clubbing, cyanosis or edema.  

PSYCH:  Calm and cooperative. 

NEUROLOGIC:  Nonfocal.  

HEM/ONC:  No adenopathy.  



Electronic health records were reviewed for laboratory and radiological 

data, as well as history. 



ASSESSMENT

1.  Diarrhea.

2.  Right lower quadrant pain.

3.  Enteritis.  

4.  Possible history of Crohn's disease, but with negative pathology on 

prior colonoscopy in 2015. Apparently, had a history of Clostridium 

difficile per some of the chart.  



Will go ahead and order stool studies.  Agree with enteric antibodies.  

Here at least the last 2 C. diff have been checked, including one in 

January and were negative.  It may be that she would benefit from 

mesalamine or steroids.  For now, will go ahead and rule out infectious and 

treat with antibiotics and see how she progresses.





Thank you very much for asking me to see Ms. Justice.  Any questions or 

concerns, please do not hesitate to contact me.  I will follow with you.



 





DD:  05/29/2018 19:48

DT:  05/29/2018 20:04

Job#:  [id number]

## 2018-05-29 NOTE — XMS REPORT
Continuity of Care Document

 Created on: 2018



JUSTICEALTONIA

External Reference #: [id number]

: 1940

Sex: Female



Demographics







 Address  [address] [place]  [address]

 

 Home Phone  [phone]

 

 Preferred Language  Unknown

 

 Marital Status  Unknown

 

 Taoism Affiliation  Unknown

 

 Race  Other

 

 Ethnic Group  Unknown





Author







 Author  [facility]

 

 Organization  [facility]

 

 Address  [address]

[place]  [address]



 

 Phone  Unavailable







Support







 Name  Relationship  Address  Phone

 

 FELECIA ALLISON MD  Caregiver  [address] [place]  [address]  Unavailable

 

 FELECIA ALLISON MD  Caregiver  [address] [place]  [address]  Unavailable

 

 DESIREE ROWE (NON STAFF)  Caregiver  [address]  [phone]

 

 DESIREE ROWE (NON STAFF)  Caregiver  [address]  [phone]

 

 SCAR GIRON MD  Caregiver  [address]

[place]  [address]  Unavailable

 

 JONI JUSTICE  Next Of Kin  [address]

[place], [place]  [address] [phone]







Care Team Providers







 Care Team Member Name  Role  Phone

 

 DESIREE ROWE (NON STAFF)  PCP  [phone]







Insurance Providers







 Guarantor  [employer]

 

 Address  [address] [place] [address]

 

 Phone  [phone]

 

 Email  PTDECLINED











 Payer  [employer] [facility]

 

 Policy Number  [id number]

 

 Subscriber's Name  Blanca Justice

 

 Relationship  18 Self / Same As Patient

 

 Group Name  [employer] [facility]

 

 Effective Date  16











 Payer  Medicare A & B

 

 Policy Number  [id number]

 

 Subscriber's Name  Blanca Justice

 

 Relationship  18 Self / Same As Patient

 

 Group Number  [id number]

 

 Group Name  RETIRED

 

 Effective Date  04







Advance Directives







 Directive  Response  Recorded Date/Time

 

 Does the patient have an advance directive?  No  18 2:38am

 

 If yes, is advance directive [facility] with  [facility]?  No  18 2:38am

 

 If not on file with [facility] will patient provide a copy?  No  18 2:38am

 

 Do you have a Directive to Physician?  No  18 7:16pm

 

 Do you have a Medical Power of ?  No  18 7:16pm

 

 Do you have an out of hospital Do Not Resuscitate Order?  No  18 7:16pm

 

 Do you have any special needs we should be aware of?  No  18 7:16pm

 

 Do you have a support person here with you today?  Yes  18 7:16pm

 

 Did patient receive Notice of Privacy Practices?  Yes  18 7:16pm

 

 Did patient receive patient rights and responsibilities?  Yes  18 7:16pm







Problems







 Medical Problem  Onset Date  Status

 

 Abdominal pain  2015  Acute

 

 Chest pain  2016  Acute

 

 Chronic anemia  Unknown   

 

 Colitis  2015  Acute

 

 Elevated troponin  2015  Acute

 

 Enteritis  2015  Acute

 

 Fall  Unknown   

 

 Fever  2015  Acute

 

 Fever  2016  Acute

 

 Guaiac positive stools  Unknown   

 

 Non-STEMI (non-ST elevated myocardial infarction)  Unknown   

 

 UTI (urinary tract infection)  2016  Acute

 

 Volume depletion  2016  Acute

 

 Vomiting  2016  Acute

 

 Vomiting and diarrhea  Unknown   

 

 Weakness  2015  Acute







Medications





Current Home Medications





 Medication  Dose  Units  Route  Directions  Days  Qty  Instructions  Start Date

 

 Alendronate Sodium 70 Mg Tablet  70  Mg  Oral  Use As Directed as needed for 
Mild Pain (1-3)        take 1 TABLET EVERY WEEK FOR BONES.   

 

 Apixaban 2.5 Mg Tablet  5  Mg  Oral  Every 12 Hours  30 Days        06/10/16

 

 Aspirin (Aspir 81) 81 Mg Tablet.  81  Mg  Oral  Daily            

 

 Calcium Citrate/Vitamin D3 (Citracal + D Maximum Caplet) 1 Each Tablet  1  Tab
  Oral  Daily            

 

 Cephalexin Monohydrate (Keflex) 500 Mg Capsule  500  Mg  Oral  Every 12 Hours 
    10      18

 

 Cetirizine Hcl 10 Mg Tablet  10  Mg  Oral  Daily            

 

 Cholestyramine (With Sugar) (Questran Packet) 4 Gm Packet  4  Gm  Oral  Twice 
A Day            

 

 Dicyclomine Hcl (Bentyl) 20 Mg Tablet  20  Mg  Oral  Four Times Daily     90 
Tab     09/09/15

 

 Escitalopram Oxalate 10 Mg Tablet  10  Mg  Oral  Daily            

 

 Famotidine 20 Mg Tab  20  Mg  Oral  Twice A Day     30 Tab      

 

 Ferrous Sulfate 325 Mg Tablet.  325  Mg  Oral  Daily            

 

 Hydralazine Hcl 25 Mg Tab  25  Mg  Oral  Every 8 Hours  30 Days        18

 

 Lansoprazole (Prevacid) 30 Mg Capsule.  30  Mg  Oral  Daily            

 

 Lisinopril 10 Mg Tablet  10  Mg  Oral  Daily     30 Tab      

 

 Loperamide Hcl (Loperamide) 2 Mg Tablet  2  Mg  Oral  Before Meals as needed 
for Diarrhea            

 

 Loratadine 10 Mg Tablet  10  Mg  Oral  Daily     30 Tab      

 

 Lovastatin 20 Mg Tablet  20  Mg  Oral  Daily            

 

 Mesalamine 400 Mg Cap  800  Mg  Oral  Three Times A Day  30 Days        

 

 Mesalamine (Pentasa) 500 Mg Capcr  1,000  Mg  Oral  Four Times Daily     30 
Tab     09/09/15

 

 Metoprolol Tartrate 25 Mg Tablet  25  Mg  Oral  Twice A Day            

 

 Metronidazole (Flagyl) 500 Mg Tablet  500  Mg  Oral  Three Times A Day  7 Days
        18

 

 Nifedipine (Nifedipine Er) 30 Mg Tab.er.24  30  Mg  Oral  Every 12 Hours  30 
Days        18

 

 Ondansetron (Zofran Odt) 4 Mg Tab.rapdis  4  Mg  Oral  Twice A Day as needed 
for Nausea And Vomiting            

 

 Ondansetron (Zofran Odt) 4 Mg Tab.rapdis  4  Mg  Oral  Every 6 Hours  10 Days 
       18

 

 Ondansetron (Zofran Odt) 4 Mg Tab.rapdis  4  Mg  Oral  Q4-6H Prn     12      

 

 Pantoprazole Sodium (Protonix) 40 Mg Tablet.  40  Mg  Oral  Daily            

 

 Pantoprazole Sodium (Protonix) 40 Mg Tablet.  40  Mg  Oral  Every 12 Hours  
30 Days        18

 

 Potassium Chloride 20 Meq Tab.er.prt  20  Meq  Oral  Daily  10 Days        

 

 Potassium Gluconate 2.5 Meq Tablet  1  Cap  Oral  Twice A Day as needed for 
Diarrhea  7 Days         

 

 Temazepam (Restoril) 15 Mg Capsule  15  Mg  Oral  Bedtime     30 Tab      

 

 Tolterodine Tartrate (Detrol La) 4 Mg Cap.er.24h  4  Mg  Oral  Daily     30 
Cap      









Past Home Medications





 Medication  Directions  Ordered  Status

 

 Levofloxacin (Levaquin) 500 Mg Tablet, 500 Mg Oral  Daily  06/10/16  
Discontinued

 

 Levofloxacin (Levaquin) 500 Mg Tablet, 500 Mg Oral  Daily  09/09/15  
Discontinued

 

 Meloxicam (Mobic*) 7.5 Mg Tablet, 7.5 Mg Oral  Daily     Discontinued

 

 Methylprednisolone (Medrol Dose Pack) 4 Mg/Dose Pack Tab, 4 Mg Oral  Daily  06/
10/16  Discontinued

 

 Metronidazole 500 Mg Tablet, 500 Mg Oral  Three Times A Day  09/09/15  
Discontinued

 

 Pantoprazole , 40 Mg Intraven  Daily     Discontinued

 

 Tramadol Hcl (Ultram) 50 Mg Tablet, 50 Mg Oral  Twice A Day as needed for Pain
     Discontinued

 

 Tramadol Hcl/Acetaminophen (Acetaminophn-Tramadol 325-37.5) 1 Each Tablet, 37.5
-325 Mg Oral  Every 6 Hours     Discontinued

 

 Zolpidem Tartrate (Ambien) 5 Mg Tablet, 5 Mg Oral  As Needed as needed for 
Insomnia     Discontinued







Social History







 Social History Problem  Response  Recorded Date/Time  Onset Date  Status

 

 Hx Psychiatric Problems  No  2018 2:38am  Not Applicable  Not Applicable

 

 Hx Eating Disorder  No  2018 2:38am  Not Applicable  Not Applicable

 

 Hx Substance Use Disorder  No  2018 2:38am  Not Applicable  Not 
Applicable

 

 Hx Depression  Yes  2018 2:38am  Not Applicable  Not Applicable

 

 Hx Alcohol Use  No  2018 2:38am  Not Applicable  Not Applicable

 

 Hx Substance Use Treatment  No  2018 2:38am  Not Applicable  Not 
Applicable

 

 Hx Physical Abuse  No  2018 2:38am  Not Applicable  Not Applicable











 Smoking Status  Start Date  Stop Date

 

 Never Smoker      







Hospital Discharge Instructions

No hospital discharge instruction information available.



Plan of Care







 Discharge Date  18 9:50am

 

 Disposition  HOME, SELF-CARE

 

 Instructions/Education Provided  Urinary Tract Infection - Women

 

 Prescriptions  See Medication Section

 

 Referrals  pcp (Internal Medicine)

Order Date: 5-7 Days

Entered Date: 2018 8:00am







Functional Status







 Query  Response  Date Recorded

 

 Assistive Devices  None

  2018 2:48am

 

 Ambulation Ability  Independent

  2018 2:48am

 

 Toileting Ability  Independent

  April 3, 2018 6:42pm







Allergies, Adverse Reactions, Alerts

No known allergies.



Immunizations

No immunization information available.



Vital Signs





Acute Vital Signs





 Vital  Response  Date/Time

 

 Temperature (Fahrenheit)  96.9 degrees F (97.6 - 99.5)  2018 7:46am

 

 Pulse      

 

    Pulse Rate (adult)  72 bpm (60 - 90)  2018 7:46am

 

 Respiratory Rate  18 bpm (12 - 24)  2018 7:46am

 

 Blood Pressure  162/81 mm Hg  2018 7:46am

 

 Height  5 ft 2 in  2018 6:48pm

 

 Weight  125.19 lb  2018 12:20am

 

 Body Mass Index  22.9 kg/m^2  2018 12:20am







Results





Laboratory Results





 Test Name  Result  Units  Flags  Reference  Collection Date/Time  Result Date/
Time  Comments

 

 Poikilocytosis  MODERATE           2018 7:44pm  2018 9:02pm   

 

 Tear Drop Cells  FEW           2018 7:44pm  2018 9:02pm   

 

 Elliptocytes  MODERATE           2018 7:44pm  2018 9:02pm   

 

 Erythrocyte Sedimentation Rate  16  mm/hr     0-20  2018 12:23pm  2018 1:10pm   

 

 Urine Opiates Screen  NEGATIVE        NEGATIVE  01/10/2018 6:00am  01/10/2018 8
:37am   

 

 Urine Barbiturates Screen  NEGATIVE        NEGATIVE  01/10/2018 6:00am  01/10/
2018 8:37am   

 

 Urine Phencyclidine Screen  POSITIVE      H  NEGATIVE  01/10/2018 6:00am  01/10
/2018 8:37am  This test provides only a screen. Positive results should be 

repeated by a confirmatory test.

 

 Urine Amphetamines Screen  NEGATIVE        NEGATIVE  01/10/2018 6:00am  01/10/
2018 8:37am   

 

 Urine Benzodiazepines Screen  POSITIVE      H  NEGATIVE  01/10/2018 6:00am  01/
10/2018 8:37am  This test provides only a screen. Positive results should be 

repeated by a confirmatory test.

 

 Urine Cocaine Screen  NEGATIVE        NEGATIVE  01/10/2018 6:00am  01/10/2018 8
:37am   

 

 Urine Cannabinoids Screen  NEGATIVE        NEGATIVE  01/10/2018 6:00am  01/10/
2018 8:37am  ***THESE RESULTS ARE FOR MEDICAL TREATMENT ONLY***



*THIS REPORT CONTAINS UNCONFIRMED SCREENING RESULTS*



POSITIVE RESULTS WILL BE CONFIRMED BY REFERENCE LAB UPON 

REQUEST







                           CUT-OFF



DRUG CLASS              CONCENTRATION



                           ng/mL







Amphetamines               1000



Methamphetamines           1000



Cocaine                     300



Opiate                      300



Phencyclidine                25



Cannabinoid                  50



Barbiturates                300



Benzodiazepine              300



Methadone                   300

 

 B-Type Natriuretic Peptide  95.6  pg/mL     0-100  01/10/2018 4:00am  01/10/
2018 4:55am   

 

 Amylase Level  51  U/L       01/10/2018 4:00am  01/10/2018 4:51am   

 

 Lipase  17  U/L     8-78  01/10/2018 4:00am  01/10/2018 4:51am   

 

 p-ANCA Titer  <1:20  titer     Neg:<1:20  01/10/2018 8:37am  01/15/2018 9:20pm
  The presence of positive fluorescence exhibiting P-ANCA or



C-ANCA patterns alone is not specific for the diagnosis of



Wegener's Granulomatosis (WG) or microscopic polyangiitis.



Decisions about treatment should not be based solely on



ANCA IFA results.  The International ANCA Group Consensus



recommends follow up testing of positive sera with both MA-



 3 and MPO-ANCA enzyme immunoassays. As many as 5% serum



samples are positive only by EIA. Ref. AM J Clin Pathol



 1999;111:507-513.



 

 c-ANCA Titer  <1:20  titer     Neg:<1:20  01/10/2018 8:37am  01/15/2018 9:20pm
   

 

 Atypical p-ANCA  <1:20  titer     Neg:<1:20  01/10/2018 8:37am  01/15/2018 9:
20pm  The atypical pANCA pattern has been observed in a



significant percentage of patients with ulcerative colitis,



primary sclerosing cholangitis and autoimmune hepatitis.



Performed at:   - [facility]  [id number]



: Ruddy Stout MD, Phone:  [phone]



 

 C-Reactive Protein  110.5  mg/L   H  0.0-4.9  2018 8:07am  2018 10:
22am  Performed at:   - [facility]  [id number]



: Ha Mcgregor MD, Phone:  [phone]



 

 Stool Occult Blood  POSITIVE      H  NEGATIVE  01/10/2018 5:10am  01/10/2018 5:
48am   

 

 Clostridium Difficile Toxin A & B  NEGATIVE        NEGATIVE  01/10/2018 2:40pm
  2018 1:01pm  Testing on stool aspirate specimens is outside 
 

claims since specimen type not validated on this assay.

 

 Basophils % (Manual)  1  %     0-1.5  2018 10:12am  2018 11:20am   

 

 Reactive Lymphocytes  1           2018 10:12am  2018 11:20am   

 

 Urine Uric Acid Crystals  MANY      H  FEW  2018 10:01am  2018 11:
06am   

 

 White Blood Count  7.48  x10e3/uL     4.8-10.8  2018 8:37am  2018 9
:39am   

 

 Red Blood Count  3.31  x10e6/uL   L  3.6-5.1  2018 8:37am  2018 9:
39am   

 

 Hemoglobin  9.2  g/dL   L  12.0-16.0  2018 8:37am  2018 9:39am   

 

 Hematocrit  28.9  %   L  34.2-44.1  2018 8:37am  2018 9:39am   

 

 Mean Corpuscular Volume  87.3  fL     81-99  2018 8:37am  2018 9:
39am   

 

 Mean Corpuscular Hemoglobin  27.8  pg   L  28-32  2018 8:37am  2018 9:39am   

 

 Mean Corpuscular Hemoglobin Concent  31.8  g/dL     31-35  2018 8:37am  
2018 9:39am   

 

 Red Cell Distribution Width  16.6  %   H  11.7-14.4  2018 8:37am  2018 9:39am   

 

 Platelet Count  267  x10e3/uL     140-360  2018 8:37am  2018 9:
39am   

 

 Neutrophils (%) (Auto)  71.1  %     38.7-80.0  2018 8:37am  2018 9:
39am   

 

 Lymphocytes (%) (Auto)  15.8  %   L  18.0-39.1  2018 8:37am  2018 9
:39am   

 

 Monocytes (%) (Auto)  10.3   %     4.4-11.3  2018 8:37am  2018 9:
39am   

 

 Eosinophils (%) (Auto)  1.2  %     0.0-6.0  2018 8:37am  2018 9:
39am   

 

 Basophils (%) (Auto)  0.5  %     0.0-1.0  2018 8:37am  2018 9:39am
   

 

 IM GRANULOCYTES %  1.1  %   H  0.0-1.0  2018 8:372018 9:39am   

 

 Neutrophils # (Auto)  5.3        2.1-6.9  2018 8:37am  2018 9:39am
   

 

 Lymphocytes # (Auto)  1.2        1.0-3.2  2018 8:37am  2018 9:39am
   

 

 Monocytes # (Auto)  0.8        0.2-0.8  2018 8:37am  2018 9:39am   

 

 Eosinophils # (Auto)  0.1        0.0-0.4  2018 8:37am  2018 9:39am
   

 

 Basophils # (Auto)  0.0        0.0-0.1  2018 8:372018 9:39am   

 

 Absolute Immature Granulocyte (auto  0.08  x10e3/uL     0-0.1  2018 8:
372018 9:39am   

 

 Differential Total Cells Counted  100           2018 8:37am  2018 
11:11am   

 

 Neutrophils % (Manual)  50  %     40-74  2018 8:372018 11:11am
   

 

 Band Neutrophils %  24  %        2018 8:37am  2018 11:11am   

 

 Lymphocytes % (Manual)  19  %     19-48  2018 8:37am  2018 11:11am
   

 

 Monocytes % (Manual)  5  %     3.4-9.0  2018 8:37am  2018 11:11am 
  

 

 Eosinophils % (Manual)  2  %     0-7  2018 8:372018 11:11am   

 

 Platelet Estimate  ADEQUATE           2018 8:37am  2018 11:11am   

 

 Platelet Morphology Comment  NORMAL           2018 8:37am  2018 11:
11am   

 

 Anisocytosis  SLIGHT           2018 6:50pm  2018 8:35pm   

 

 Red Cell Morphology Comment  NORMAL           2018 8:37am  2018 11:
11am   

 

 Prothrombin Time  15.5  seconds   H  11.9-14.5  2018 11:25pm  2018 
11:40pm   

 

 Prothromb Time International Ratio  1.33           2018 11:25pm  2018 11:40pm  Oral Anticoagulant Therapy INR Values:



 1. Low Intensity Therapy        1.5 - 2.0



 2. Moderate Intensity Therapy   2.0 - 3.0



 3. High Intensity Therapy(1)    2.5 - 3.5



 4. High Intensity Therapy(2)    3.0 - 4.0



 5. Panic Value INR              > 5.0

 

 Activated Partial Thromboplast Time  33.2  seconds     23.8-35.5  2018 11
:25pm  2018 11:40pm   

 

 Urine Color  YELLOW        YELLOW  2018 7:53pm  2018 8:25pm   

 

 Urine Clarity  CLOUDY      H  CLEAR  2018 7:53pm  2018 8:25pm   

 

 Urine Specific Gravity  1.015        1.010-1.025  2018 7:53pm  2018 8:25pm   

 

 Urine pH  5        5 - 7  2018 7:53pm  2018 8:25pm   

 

 Urine Leukocyte Esterase  1+      H  NEGATIVE  2018 7:53pm  2018 8:
25pm   

 

 Urine Nitrite  POSITIVE      H  NEGATIVE  2018 7:53pm  2018 8:25pm
   

 

 Urine Protein  TRACE      H  NEGATIVE  2018 7:53pm  2018 8:25pm   

 

 Urine Glucose (UA)  NEGATIVE        NEGATIVE  2018 7:53pm  2018 8:
25pm   

 

 Urine Ketones  NEGATIVE        NEGATIVE  2018 7:53pm  2018 8:25pm 
  

 

 Urine Urobilinogen  0.2  mg/dL     0.2 - 1  2018 7:53pm  2018 8:
25pm   

 

 Urine Bilirubin  NEGATIVE        NEGATIVE  2018 7:53pm  2018 8:
25pm   

 

 Urine Blood  4+      H  NEGATIVE  2018 7:53pm  2018 8:25pm   

 

 Urine WBC  >50  /HPF   H  0-5  2018 7:53pm  2018 8:44pm   

 

 Urine RBC  21-50  /HPF   H  0-5  2018 7:53pm  2018 8:44pm   

 

 Urine Bacteria  MANY  /HPF   H  NONE  2018 7:53pm  2018 8:44pm   

 

 Urine Epithelial Cells  MANY  /LPF     NONE  2018 7:53pm  2018 8:
44pm   

 

 Sodium Level  139  mmol/L     136-145  2018 10:49am  2018 11:26am 
  

 

 Potassium Level  4.1  mmol/L     3.5-5.1  2018 10:49am  2018 11:
26am   

 

 Chloride Level  111  mmol/L   H    2018 10:49am  2018 11:
26am   

 

 Influenza Virus Types A,B Antigen  NEGATIVE        NEGATIVE  2018 6:50pm
  2018 7:43pm   

 

 Carbon Dioxide Level  21  mmol/L   L  22-29  2018 10:49am  2018 11:
26am   

 

 Anion Gap  11.1  mmol/L     8-16  2018 10:49am  2018 11:26am   

 

 Blood Urea Nitrogen  5  mg/dL   L  7-2018 10:49am  2018 11:
26am   

 

 Creatinine  0.61  mg/dL     0.57-1.11  2018 10:49am  2018 11:26am 
  

 

 BUN/Creatinine Ratio  8        6-25  2018 10:49am  2018 11:26am   

 

 Estimat Glomerular Filtration Rate  > 60  ML/MIN     60-  2018 10:49am  
2018 11:26am  Ranges were taken from the National Kidney Disease 
Education 

Program and the National Kid[employer] Foundation literature.







Reference ranges:



 60 or greater: Normal



 16-59 (for 3 consecutive months): Chronic kidney disease 



 15 or less: Kidney failure

 

 Glucose Level  111  mg/dL       2018 10:49am  2018 11:26am   

 

 Calcium Level  7.8  mg/dL   L  8.4-10.2  2018 10:49am  2018 11:
26am   

 

 Lactic Acid Level  17.4  MG/DL     4.5-19.8  2018 11:25pm  2018 11:
44pm   

 

 Phosphorus Level  2.5  MG/DL     2.3-4.7  2018 10:49am  2018 11:
26am   

 

 Magnesium Level  2.0  MG/DL     1.3-2.1  2018 10:49am  2018 11:
26am   

 

 Total Bilirubin  0.2  mg/dL     0.2-1.2  2018 8:37am  2018 9:39am 
  

 

 Aspartate Amino Transf (AST/SGOT)  16  IU/L     5-34  2018 8:37am  2018 9:39am   

 

 Alanine Aminotransferase (ALT/SGPT)  8  IU/L     0-55  2018 8:37am   9:39am   

 

 Total Protein  4.7  g/dL   L  6.5-8.1  2018 8:37am  2018 9:39am   

 

 Albumin  2.2  g/dL   L  3.5-5.0  2018 8:37am  2018 9:39am   

 

 Globulin  2.5  g/dL     2.3-3.5  2018 8:37am  2018 9:39am   

 

 Albumin/Globulin Ratio  0.9        0.8-2.0  2018 8:37am  2018 9:
39am   

 

 Alkaline Phosphatase  81  IU/L       2018 8:37am  2018 9:
39am   

 

 Creatine Kinase  136  IU/L       2018 12:20am  2018 3:57am   

 

 Creatine Kinase MB  3.30  ng/mL     0-5.0  2018 12:20am  2018 4:
04am   

 

 Troponin I  0.355  ng/mL   H  0-0.300  2018 12:20am  2018 4:04am   

 

 Thyroid Stimulating Hormone (TSH)  1.924  uIU/mL     0.350-4.940  2018 11
:05pm  2018 12:03am   









Microbiology Results





 Procedure  Source  Organism/Result  Collection Date/Time  Result Date/Time  
Result Status

 

 Urine Culture  Urine,Clean Catch   ESCHERICHIA COLI  01/10/2018 6:00am  2018 8:22am  Final

 

 Urine Culture  Urine,Catheterized   ESCHERICHIA COLI  2018 7:53pm  2018 7:48am  Final

 

 Blood Culture  Blood                               NO GROWTH AFTER 72 HOURS   7:40pm  2018 8:02pm  Preliminary







Procedures







 Procedure  Status  Date  Provider(s)

 

 EXCISION OF ILEUM, ENDO, DIAGN  Completed  18  JACQUELINE JOHNSON MD

 

 EXCISION OF LARGE INTESTINE, ENDO, DIAGN  Completed  18  JACQUELINE JOHNSON MD

 

 EXCISION OF DUODENUM, ENDO, DIAGN  Completed  18  JACQUELINE JOHNSON MD

 

 EXCISION OF STOMACH, PYLORUS, ENDO, DIAGN  Completed  18  JACQUELINE JOHNSON MD

 

 EXCISION OF LOWER ESOPHAGUS, ENDO, DIAGN  Completed  18  JACQUELINE JOHNSON MD

 

 INTRODUCTION OF VASOPRESSOR INTO PERIPH VEIN, PERC APPROACH  Completed  01/10/
18  SCAR GIRON MD

 

 INSERTION OF INFUSION DEVICE INTO R ATRIUM, PERC APPROACH  Completed  01/10/18
  BOB LEON MD

 

 Computed tomography of brain without radiopaque contrast  Active  01/10/18  
SCAR GIRON MD

 

 Computed tomography of abdomen and pelvis with contrast  Active  01/10/18  
SCAR GIRON MD

 

 X-ray of chest, two views  Active  01/10/18  SCAR GIRON MD

 

 Computed tomography of brain without radiopaque contrast  Active  18  RADHA MCKEON NP

 

 X-ray of chest, two views  Active  18  RADHA QUIROZ NP

 

 Computed tomography of brain without radiopaque contrast  Active  18  
ANGEL PERLA NP







Encounters







 Encounter  Location  Arrival/Admit Date  Discharge/Depart Date  Attending 
Provider

 

 Discharged Inpatient  [facility]  18 8:11pm  18 
9:50am  FELECIA ALLISON MD

 

 Departed Emergency Room  [facility]  18 9:58am   2:15pm  MELY HAN MD

 

 Discharged Inpatient  [facility]  01/10/18 6:39am  18 
7:01pm  FELECIA ALLISON MD

## 2018-05-29 NOTE — DIAGNOSTIC IMAGING REPORT
PROCEDURE: CT ABDOMEN AND PELVIS WITH CONTRAST

 

TECHNIQUE: 

The abdomen and pelvis were scanned utilizing a multidetector helical 

scanner from the diaphragm to the lesser trochanter after the IV 

administration of 100 cc of Isovue 370 and the oral administration of 

water.  Coronal and sagittal multiplanar reformations were obtained.

 

COMPARISON: [facility], CT, CT ABDOMEN/PELVIS W, 

7/16/2015, 23:02.  [facility], CT, CT ABDOMEN/PELVIS W, 

1/10/2018, 5:09.

 

INDICATIONS:   ABDOMEN PAIN 

 

FINDINGS:

LOWER THORAX: Stable moderate hiatal hernia. Mild atelectatic changes 

in bilateral lower lobes. Mild cardiomegaly.

 

HEPATOBILIARY: No focal hepatic lesions. Stable mild central 

intrahepatic biliary ductal dilatation and mild dilation of the common 

bile duct. No intraluminal filling defects. Cholelithiasis. No wall 

thickening or pericholecystic fluid..

SPLEEN: No splenomegaly.

PANCREAS: No focal masses or ductal dilatation. 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 

diverticulum between the second portion of the duodenum and pancreatic 

head (series 2, image 35).

 

ADRENALS: No adrenal nodules.

KIDNEYS/URETERS: No hydronephrosis, stones, or solid mass lesions.

PELVIC ORGANS/BLADDER: Moderately distended bladder, without focal 

lesions. Uterus is not visualized. No adnexal masses.

 

PERITONEUM / RETROPERITONEUM: Trace free fluid in the pelvic 

cul-de-sac.

LYMPH NODES: Enlarged mesenteric node, measuring 1.1 cm in short axis 

(series 2, image 52). There are multiple prominent mesenteric nodes, 

which measure 0.8-0.9 cm in short axis (for example series 2, images 

55, 51, 57 and, 59). 

VESSELS: Moderate calcification of the aorta and iliac vessels. Celiac 

trunk, superior and inferior mesenteric, and bilateral renal arteries 

are patent. Portal, superior mesenteric, and splenic veins are patent.

 

GI TRACT: Multiple loops of distal ileum, extending to the terminal 

ileum show moderate to marked wall thickening with mucosal enhancement, 

increased vascularity and mild surrounding stranding, similar in 

appearance to prior CT dated 1/10/2018, 7/16/2015 and 1/8/2016. No foci 

of extraluminal air or well-defined enhancing fluid collections. 

Duodenum and jejunum show no wall thickening. Large bowel is 

unremarkable. No bowel obstruction or dilation.

 

BONES AND SOFT TISSUES: Unremarkable.

 

IMPRESSION:

 

1. Findings consistent with enteritis involving the ilium and extending 

to the terminal ileum. Given the recurrent nature of the findings, as 

noted on prior CTs, inflammatory bowel disease (particularly Crohn's) 

is a primary consideration, despite the patient's advanced age. No 

evidence of perforation or abscess formation.

2. Prominent and single enlarged mesenteric nodes, likely reactive.

3. Stable mild central intrahepatic biliary ductal dilation and mild 

dilation of the common bile duct. Cholelithiasis, without CT evidence 

of cholecystitis.

4. Stable moderate hiatal hernia.

 

 

 

Maxwell Barnett M.D.  

Dictated by:  Maxwell Barnett M.D. on 5/29/2018 at 15:00     

Electronically approved by:  Maxwell Barnett M.D. on 5/29/2018 at 

15:00

## 2018-05-30 VITALS — DIASTOLIC BLOOD PRESSURE: 72 MMHG | SYSTOLIC BLOOD PRESSURE: 142 MMHG

## 2018-05-30 VITALS — DIASTOLIC BLOOD PRESSURE: 70 MMHG | SYSTOLIC BLOOD PRESSURE: 130 MMHG

## 2018-05-30 VITALS — DIASTOLIC BLOOD PRESSURE: 72 MMHG | SYSTOLIC BLOOD PRESSURE: 147 MMHG

## 2018-05-30 VITALS — DIASTOLIC BLOOD PRESSURE: 62 MMHG | SYSTOLIC BLOOD PRESSURE: 125 MMHG

## 2018-05-30 VITALS — SYSTOLIC BLOOD PRESSURE: 121 MMHG | DIASTOLIC BLOOD PRESSURE: 69 MMHG

## 2018-05-30 VITALS — SYSTOLIC BLOOD PRESSURE: 125 MMHG | DIASTOLIC BLOOD PRESSURE: 62 MMHG

## 2018-05-30 LAB
ALBUMIN SERPL-MCNC: 2.1 G/DL (ref 3.5–5)
ALBUMIN/GLOB SERPL: 0.8 {RATIO} (ref 0.8–2)
ALP SERPL-CCNC: 60 IU/L (ref 40–150)
ALT SERPL-CCNC: 6 IU/L (ref 0–55)
ANION GAP SERPL CALC-SCNC: 11.5 MMOL/L (ref 8–16)
BASOPHILS # BLD AUTO: 0 10*3/UL (ref 0–0.1)
BASOPHILS NFR BLD AUTO: 0.6 % (ref 0–1)
BUN SERPL-MCNC: 9 MG/DL (ref 7–26)
BUN/CREAT SERPL: 15 (ref 6–25)
CALCIUM SERPL-MCNC: 7.5 MG/DL (ref 8.4–10.2)
CHLORIDE SERPL-SCNC: 114 MMOL/L (ref 98–107)
CO2 SERPL-SCNC: 20 MMOL/L (ref 22–29)
DEPRECATED NEUTROPHILS # BLD AUTO: 2.8 10*3/UL (ref 2.1–6.9)
EGFRCR SERPLBLD CKD-EPI 2021: > 60 ML/MIN (ref 60–?)
EOSINOPHIL # BLD AUTO: 0.1 10*3/UL (ref 0–0.4)
EOSINOPHIL NFR BLD AUTO: 1.9 % (ref 0–6)
ERYTHROCYTE [DISTWIDTH] IN CORD BLOOD: 16.5 % (ref 11.7–14.4)
GLOBULIN PLAS-MCNC: 2.6 G/DL (ref 2.3–3.5)
GLUCOSE SERPLBLD-MCNC: 88 MG/DL (ref 74–118)
HCT VFR BLD AUTO: 28.6 % (ref 34.2–44.1)
HGB BLD-MCNC: 8.9 G/DL (ref 12–16)
LYMPHOCYTES # BLD: 1.1 10*3/UL (ref 1–3.2)
LYMPHOCYTES NFR BLD AUTO: 22.7 % (ref 18–39.1)
MCH RBC QN AUTO: 26.6 PG (ref 28–32)
MCHC RBC AUTO-ENTMCNC: 31.1 G/DL (ref 31–35)
MCV RBC AUTO: 85.4 FL (ref 81–99)
MONOCYTES # BLD AUTO: 0.7 10*3/UL (ref 0.2–0.8)
MONOCYTES NFR BLD AUTO: 15.2 % (ref 4.4–11.3)
NEUTS SEG NFR BLD AUTO: 59.2 % (ref 38.7–80)
PLATELET # BLD AUTO: 286 X10E3/UL (ref 140–360)
POTASSIUM SERPL-SCNC: 3.5 MMOL/L (ref 3.5–5.1)
RBC # BLD AUTO: 3.35 X10E6/UL (ref 3.6–5.1)
SODIUM SERPL-SCNC: 142 MMOL/L (ref 136–145)

## 2018-05-30 RX ADMIN — FAMOTIDINE SCH MG: 20 TABLET, FILM COATED ORAL at 10:39

## 2018-05-30 RX ADMIN — SODIUM CHLORIDE SCH MLS/HR: 9 INJECTION, SOLUTION INTRAVENOUS at 21:00

## 2018-05-30 RX ADMIN — MESALAMINE SCH MG: 500 CAPSULE ORAL at 10:39

## 2018-05-30 RX ADMIN — CIPROFLOXACIN SCH MLS/HR: 2 INJECTION, SOLUTION INTRAVENOUS at 10:38

## 2018-05-30 RX ADMIN — MESALAMINE SCH MG: 500 CAPSULE ORAL at 14:04

## 2018-05-30 RX ADMIN — POTASSIUM CHLORIDE SCH MEQ: 1500 TABLET, EXTENDED RELEASE ORAL at 10:38

## 2018-05-30 RX ADMIN — MESALAMINE SCH MG: 500 CAPSULE ORAL at 21:00

## 2018-05-30 RX ADMIN — HYDRALAZINE HYDROCHLORIDE SCH MG: 25 TABLET ORAL at 21:51

## 2018-05-30 RX ADMIN — METRONIDAZOLE SCH MLS/HR: 500 INJECTION, SOLUTION INTRAVENOUS at 14:04

## 2018-05-30 RX ADMIN — METOPROLOL TARTRATE SCH MG: 25 TABLET, FILM COATED ORAL at 10:39

## 2018-05-30 RX ADMIN — Medication SCH MG: at 10:38

## 2018-05-30 RX ADMIN — LISINOPRIL SCH MG: 10 TABLET ORAL at 10:39

## 2018-05-30 RX ADMIN — TEMAZEPAM PRN MG: 15 CAPSULE ORAL at 23:28

## 2018-05-30 RX ADMIN — METRONIDAZOLE SCH MLS/HR: 500 INJECTION, SOLUTION INTRAVENOUS at 06:37

## 2018-05-30 RX ADMIN — SODIUM CHLORIDE SCH MLS/HR: 9 INJECTION, SOLUTION INTRAVENOUS at 07:43

## 2018-05-30 RX ADMIN — METOPROLOL TARTRATE SCH MG: 25 TABLET, FILM COATED ORAL at 17:43

## 2018-05-30 RX ADMIN — SODIUM CHLORIDE SCH MLS/HR: 9 INJECTION, SOLUTION INTRAVENOUS at 14:10

## 2018-05-30 RX ADMIN — METRONIDAZOLE SCH MLS/HR: 500 INJECTION, SOLUTION INTRAVENOUS at 21:51

## 2018-05-30 RX ADMIN — FAMOTIDINE SCH MG: 20 TABLET, FILM COATED ORAL at 17:43

## 2018-05-30 RX ADMIN — MESALAMINE SCH MG: 500 CAPSULE ORAL at 17:43

## 2018-05-30 RX ADMIN — CIPROFLOXACIN SCH MLS/HR: 2 INJECTION, SOLUTION INTRAVENOUS at 20:11

## 2018-05-30 RX ADMIN — PANTOPRAZOLE SODIUM SCH MG: 40 TABLET, DELAYED RELEASE ORAL at 10:39

## 2018-05-30 RX ADMIN — HYDRALAZINE HYDROCHLORIDE SCH MG: 25 TABLET ORAL at 14:04

## 2018-05-30 NOTE — HISTORY AND PHYSICAL
PRIMARY CARE PHYSICIAN:  Dr. Barrios.



CHIEF COMPLAINT:  Diarrhea, nausea and vomiting.



HISTORY OF PRESENT ILLNESS:  This 77-year-old woman, with a history of C. 

difficile colitis and possible Crohn's disease, is now developing nausea, 

vomiting, and diarrhea, ongoing for about 3 days.  Therefore, she came to 

the hospital.  She described the diarrhea as watery.  Had mild abdominal 

discomfort.  



PAST MEDICAL HISTORY

1. E. coli urinary tract infection.

2. Severe hypokalemia. 

3. Acute gastroenteritis.

4. Recurrent intestinal bleeding.  

5. Severe sepsis.

6. Atrial fibrillation.

7. Diarrhea. 

8. Normocytic anemia.  

9. Non-ST-elevation MI.

10. Depression.

11. C. difficile colitis. 

12. Iron deficiency anemia.

13. Sepsis.

14. Hypomagnesemia.

15. Hypokalemia.



PAST SURGICAL HISTORY:  None.



ALLERGIES:  PER ELECTRONIC MEDICAL RECORD.



FAMILY HISTORY AND SOCIAL HISTORY:  History of alcohol, but quit.  No 

illicits or cigarettes.  She lives alone and is .



MEDICATIONS:  Per electronic medical record.



REVIEW OF SYSTEMS:  Denies any dizziness or chest pain.



PHYSICAL EXAMINATION 

VITAL SIGNS:  Reviewed. 

GENERAL:  A tired-appearing woman resting in bed.  

HEENT:  Anicteric.  Pupils are responsive to light.  No oral lesions. 

CARDIOVASCULAR:  Normal S1 and S2.  

LUNGS:  Moderate breath sounds. 

ABDOMEN:  Soft, nondistended.  Lower abdominal discomfort.  No rebound or 

guarding.

EXTREMITIES:  No edema.

SKIN:  Dry. 

PSYCHIATRIC:  Flat affect. 



LABS:  Reviewed.



MEDICATIONS:  Reviewed. 



ASSESSMENT AND PLAN:  A 77-year-old woman.

1. Acute enteritis.  Continue Flagyl and Levaquin and rehydration.

2. Hypokalemia.  Replace and recheck.

3. Atrial fibrillation.  Rate control.

4. Iron deficiency anemia.  Transfusion, ferrous sulfate.

5. Hypertension.  Add antihypertensive medication.

6. Prophylaxis:  Use SCDs and Pepcid.



7. Disposition:  Obtain stool for C. diff.  Continue with Flagyl and 

Cipro.  Follow up GI recommendations.

  







DD:  05/30/2018 07:08

DT:  05/30/2018 07:13

Job#:  [id number]

## 2018-05-31 VITALS — DIASTOLIC BLOOD PRESSURE: 79 MMHG | SYSTOLIC BLOOD PRESSURE: 154 MMHG

## 2018-05-31 VITALS — DIASTOLIC BLOOD PRESSURE: 75 MMHG | SYSTOLIC BLOOD PRESSURE: 151 MMHG

## 2018-05-31 VITALS — DIASTOLIC BLOOD PRESSURE: 58 MMHG | SYSTOLIC BLOOD PRESSURE: 110 MMHG

## 2018-05-31 VITALS — DIASTOLIC BLOOD PRESSURE: 69 MMHG | SYSTOLIC BLOOD PRESSURE: 146 MMHG

## 2018-05-31 VITALS — DIASTOLIC BLOOD PRESSURE: 67 MMHG | SYSTOLIC BLOOD PRESSURE: 147 MMHG

## 2018-05-31 RX ADMIN — METRONIDAZOLE SCH MLS/HR: 500 INJECTION, SOLUTION INTRAVENOUS at 13:25

## 2018-05-31 RX ADMIN — Medication SCH MG: at 09:30

## 2018-05-31 RX ADMIN — METRONIDAZOLE SCH MLS/HR: 500 INJECTION, SOLUTION INTRAVENOUS at 05:25

## 2018-05-31 RX ADMIN — CIPROFLOXACIN SCH MLS/HR: 2 INJECTION, SOLUTION INTRAVENOUS at 09:30

## 2018-05-31 RX ADMIN — HYDRALAZINE HYDROCHLORIDE SCH MG: 25 TABLET ORAL at 05:25

## 2018-05-31 RX ADMIN — PANTOPRAZOLE SODIUM SCH MG: 40 TABLET, DELAYED RELEASE ORAL at 09:31

## 2018-05-31 RX ADMIN — SODIUM CHLORIDE SCH MLS/HR: 9 INJECTION, SOLUTION INTRAVENOUS at 09:30

## 2018-05-31 RX ADMIN — LISINOPRIL SCH MG: 10 TABLET ORAL at 09:31

## 2018-05-31 RX ADMIN — MESALAMINE SCH MG: 500 CAPSULE ORAL at 13:25

## 2018-05-31 RX ADMIN — METOPROLOL TARTRATE SCH MG: 25 TABLET, FILM COATED ORAL at 09:32

## 2018-05-31 RX ADMIN — MESALAMINE SCH MG: 500 CAPSULE ORAL at 09:30

## 2018-05-31 RX ADMIN — POTASSIUM CHLORIDE SCH MEQ: 1500 TABLET, EXTENDED RELEASE ORAL at 09:30

## 2018-05-31 RX ADMIN — FAMOTIDINE SCH MG: 20 TABLET, FILM COATED ORAL at 09:31

## 2018-05-31 RX ADMIN — HYDRALAZINE HYDROCHLORIDE SCH MG: 25 TABLET ORAL at 13:25

## 2018-05-31 NOTE — DISCHARGE SUMMARY
PRINCIPAL DIAGNOSES 

1.  Acute enteritis.

2.  Hypokalemia.

3.  Atrial fibrillation.

4.  Iron deficiency anemia.

5.  Hypertension.



SECONDARY DIAGNOSIS:  Hypertension. 



CHIEF COMPLAINT:  Nausea, vomiting and diarrhea.



HISTORY OF PRESENT ILLNESS:  A 77-year-old woman with nausea, vomiting and 

diarrhea.  Please refer to the H and P for further details.



HOSPITAL COURSE:  The patient was found to have acute enteritis treated 

with Flagyl, Levaquin and steroids.  She had hypokalemia, which was 

replaced.  Atrial fibrillation with rate control.  Iron deficiency anemia 

treated with ferrous sulfate.  She had hypertension.  Blood pressure 

controlled with medication regimen.  The patient is doing well.  No 

diarrhea overnight.  Will recheck the potassium this morning, and discharge 

the patient later on today.



DISCHARGE MEDICATIONS:  Per electronic medical records.





FOLLOWUP

1.  Primary care doctor in 1 week.  

2.  GI service in 2 weeks. 







 _________________________________

FELECIA ALLISON MD



DD:  05/31/2018 07:18

DT:  05/31/2018 07:26

Job#:  [id number]

## 2018-06-30 NOTE — DIAGNOSTIC IMAGING REPORT
Non-tunneled Central Venous Catheter Revision 4/2/2018



Pre-Procedure Diagnosis: Malpositioned right subclavian central venous

catheter.

Post-procedure Diagnosis:Malpositioned right subclavian central venous catheter



: GUSTAVO Stout

Assistant: None

Sedation: None. 1% lidocaine local anesthesia.



Radiation Dose:1.12 mGy (cumulative air kerma)

Fluoroscopy time:0.3 minutes



Estimate blood loss: <5 mL Blood administered: None

Complications: None

Implants/Grafts: 16 cm 7-Romanian 3 lumen CVC

Specimen: None





Procedure:

Informed consent was obtained and the patient positioned supine in the

fluoroscopy suite. The indwelling subclavian central venous catheter was

prepped and draped in standard fashion.



The indwelling catheter was removed over an 035 wire. The wire was repositioned

from the right internal jugular vein to the superior vena cava under

fluoroscopic guidance. A new 7-Romanian 16 cm 3 lm central venous catheter was

positioned at the low SVC/superior atrial-caval junction under fluoroscopy.



At the end of the procedure the catheter was flushed, secured to the skin and a

sterile dressing applied. The patient tolerated the procedure well and without

immediate complication.



Findings:

Initial radiograph demonstrated the previously placed subclavian central venous

catheter was malpositioned in the right internal jugular vein.



Impression:

Successful repositioning/exchange of a non-tunneled right subclavian central

venous catheter using fluoroscopic guidance.







This report was generated with voice-recognition technology. Errors in

transcription can occur. Please interpret accordingly and contact a radiologist

if there are any questions regarding the report. 



Signed by: Dr. Oren Stout M.D. on 4/2/2018 10:59 AM [facility]    NAME: Dana Evans is a 27 y o  male  : 1986    MRN: [id number]  DATE: 2018  TIME: 2:37 PM    Assessment and Plan   Puncture wound of right foot, initial encounter [S91 331A]  1  Puncture wound of right foot, initial encounter  TDAP Vaccine greater than or equal to 6yo    ciprofloxacin (CIPRO) 500 mg tablet   2  Cellulitis of left upper extremity  ciprofloxacin (CIPRO) 500 mg tablet    predniSONE 10 mg tablet       Patient Instructions     Patient Instructions   Patient was given tetanus booster today  Start antibiotic intake as directed  Can start prednisone in 24 hours  If not improving over the next 7-10 days, follow up with PCP  Chief Complaint     Chief Complaint   Patient presents with    Hand Swelling     left    Puncture Wound     right foot        History of Present Illness   65-year-old male here with complaint of left hand swelling and redness  Patient states symptoms started yesterday  He does have bad psoriasis on his hands and there was cracked  He is concerned that he may have an infection  Yesterday he also stepped on to alonso nails  Nails did go through his sneaker  He is not up-to-date on his tetanus  Review of Systems   Review of Systems   Constitutional: Negative for activity change, appetite change, chills, diaphoresis, fatigue, fever and unexpected weight change  HENT: Negative for congestion, ear pain, hearing loss, sinus pressure, sneezing, sore throat and tinnitus  Eyes: Negative for photophobia, redness and visual disturbance  Respiratory: Negative for apnea, cough, chest tightness, shortness of breath, wheezing and stridor  Cardiovascular: Negative for chest pain, palpitations and leg swelling  Gastrointestinal: Negative for abdominal distention, abdominal pain, blood in stool, constipation, diarrhea, nausea and vomiting     Endocrine: Negative for cold intolerance, heat intolerance, polydipsia, polyphagia and polyuria  Genitourinary: Negative for difficulty urinating, dysuria, flank pain, hematuria and urgency  Musculoskeletal: Negative for arthralgias, back pain, gait problem, myalgias, neck pain and neck stiffness  Skin: Positive for rash and wound  Neurological: Negative for dizziness, tremors, seizures, syncope, weakness, light-headedness, numbness and headaches  Hematological: Negative for adenopathy  Does not bruise/bleed easily  Psychiatric/Behavioral: Negative for agitation, behavioral problems, confusion and decreased concentration  The patient is not nervous/anxious  All other systems reviewed and are negative  Current Medications     Current Outpatient Prescriptions:     ciprofloxacin (CIPRO) 500 mg tablet, Take 1 tablet (500 mg total) by mouth every 12 (twelve) hours for 7 days, Disp: 14 tablet, Rfl: 0    predniSONE 10 mg tablet, Take 3 tabs BID X 2 days, 2 tabs BID X 2 days, 1 tab BID X 2 days, 1 tab daily X 2 days  Start on 7/1/18, Disp: 26 tablet, Rfl: 0    Current Allergies     Allergies as of 06/30/2018    (No Known Allergies)          The following portions of the patient's history were reviewed and updated as appropriate: allergies, current medications, past family history, past medical history, past social history, past surgical history and problem list    History reviewed  No pertinent past medical history  History reviewed  No pertinent surgical history  History reviewed  No pertinent family history  Social History     Social History    Marital status: Single     Spouse name: N/A    Number of children: N/A    Years of education: N/A     Occupational History    Not on file       Social History Main Topics    Smoking status: Never Smoker    Smokeless tobacco: Never Used    Alcohol use Not on file    Drug use: Unknown    Sexual activity: Not on file     Other Topics Concern    Not on file     Social History Narrative    No narrative on file     Medications have been verified  Objective   /88   Pulse 104   Temp (!) 97 3 °F (36 3 °C)   Resp 16   Ht 5' 10" (1 778 m)   Wt 107 kg (235 lb)   SpO2 99%   BMI 33 72 kg/m²      Physical Exam   Physical Exam   Constitutional: He appears well-developed and well-nourished  No distress  HENT:   Head: Normocephalic  Right Ear: External ear normal    Left Ear: External ear normal    Nose: Nose normal    Mouth/Throat: Oropharynx is clear and moist  No oropharyngeal exudate  Neck: Normal range of motion  Neck supple  Cardiovascular: Normal rate, regular rhythm and normal heart sounds  No murmur heard  Pulmonary/Chest: Effort normal and breath sounds normal  No respiratory distress  He has no wheezes  He has no rales  Abdominal: Soft  Bowel sounds are normal  There is no tenderness  Musculoskeletal: Normal range of motion  Lymphadenopathy:     He has no cervical adenopathy  Skin: Skin is warm  No rash noted  There is erythema (Erythema and swelling over dorsal aspect of left hand and over middle and 4th finger  Slightly tender to palpation with increased warmth  Puncture wounds noted right foot)  Vitals reviewed

## 2018-07-12 ENCOUNTER — HOSPITAL ENCOUNTER (INPATIENT)
Dept: HOSPITAL 88 - ER | Age: 78
LOS: 3 days | Discharge: HOME | DRG: 386 | End: 2018-07-15
Attending: INTERNAL MEDICINE | Admitting: INTERNAL MEDICINE
Payer: MEDICARE

## 2018-07-12 VITALS — BODY MASS INDEX: 22.46 KG/M2 | WEIGHT: 122.06 LBS | HEIGHT: 62 IN

## 2018-07-12 VITALS — SYSTOLIC BLOOD PRESSURE: 145 MMHG | DIASTOLIC BLOOD PRESSURE: 68 MMHG

## 2018-07-12 DIAGNOSIS — E87.6: ICD-10-CM

## 2018-07-12 DIAGNOSIS — I48.91: ICD-10-CM

## 2018-07-12 DIAGNOSIS — K50.90: Primary | ICD-10-CM

## 2018-07-12 DIAGNOSIS — F32.9: ICD-10-CM

## 2018-07-12 DIAGNOSIS — B96.20: ICD-10-CM

## 2018-07-12 DIAGNOSIS — E87.2: ICD-10-CM

## 2018-07-12 DIAGNOSIS — D64.9: ICD-10-CM

## 2018-07-12 DIAGNOSIS — K80.20: ICD-10-CM

## 2018-07-12 LAB
ALBUMIN SERPL-MCNC: 3 G/DL (ref 3.5–5)
ALBUMIN/GLOB SERPL: 0.9 {RATIO} (ref 0.8–2)
ALP SERPL-CCNC: 60 IU/L (ref 40–150)
ALT SERPL-CCNC: < 6 IU/L (ref 0–55)
ANION GAP SERPL CALC-SCNC: 15.5 MMOL/L (ref 8–16)
BACTERIA URNS QL MICRO: (no result) /HPF
BASOPHILS # BLD AUTO: 0.1 10*3/UL (ref 0–0.1)
BASOPHILS NFR BLD AUTO: 0.4 % (ref 0–1)
BILIRUB UR QL: NEGATIVE
BUN SERPL-MCNC: 10 MG/DL (ref 7–26)
BUN/CREAT SERPL: 13 (ref 6–25)
CALCIUM SERPL-MCNC: 8.7 MG/DL (ref 8.4–10.2)
CHLORIDE SERPL-SCNC: 101 MMOL/L (ref 98–107)
CLARITY UR: (no result)
CO2 SERPL-SCNC: 24 MMOL/L (ref 22–29)
COLOR UR: YELLOW
DEPRECATED NEUTROPHILS # BLD AUTO: 9.7 10*3/UL (ref 2.1–6.9)
DEPRECATED RBC URNS MANUAL-ACNC: (no result) /HPF (ref 0–5)
EGFRCR SERPLBLD CKD-EPI 2021: > 60 ML/MIN (ref 60–?)
EOSINOPHIL # BLD AUTO: 0 10*3/UL (ref 0–0.4)
EOSINOPHIL NFR BLD AUTO: 0.3 % (ref 0–6)
EPI CELLS URNS QL MICRO: (no result) /LPF
ERYTHROCYTE [DISTWIDTH] IN CORD BLOOD: 17.2 % (ref 11.7–14.4)
GLOBULIN PLAS-MCNC: 3.3 G/DL (ref 2.3–3.5)
GLUCOSE SERPLBLD-MCNC: 164 MG/DL (ref 74–118)
HCT VFR BLD AUTO: 38.4 % (ref 34.2–44.1)
HGB BLD-MCNC: 12.2 G/DL (ref 12–16)
KETONES UR QL STRIP.AUTO: NEGATIVE
LEUKOCYTE ESTERASE UR QL STRIP.AUTO: NEGATIVE
LIPASE SERPL-CCNC: 10 U/L (ref 8–78)
LYMPHOCYTES # BLD: 0.8 10*3/UL (ref 1–3.2)
LYMPHOCYTES NFR BLD AUTO: 7.2 % (ref 18–39.1)
MCH RBC QN AUTO: 27.9 PG (ref 28–32)
MCHC RBC AUTO-ENTMCNC: 31.8 G/DL (ref 31–35)
MCV RBC AUTO: 87.7 FL (ref 81–99)
MONOCYTES # BLD AUTO: 0.9 10*3/UL (ref 0.2–0.8)
MONOCYTES NFR BLD AUTO: 7.8 % (ref 4.4–11.3)
NEUTS SEG NFR BLD AUTO: 84 % (ref 38.7–80)
NITRITE UR QL STRIP.AUTO: NEGATIVE
PLATELET # BLD AUTO: 259 X10E3/UL (ref 140–360)
POTASSIUM SERPL-SCNC: 3.5 MMOL/L (ref 3.5–5.1)
PROT UR QL STRIP.AUTO: NEGATIVE
RBC # BLD AUTO: 4.38 X10E6/UL (ref 3.6–5.1)
SODIUM SERPL-SCNC: 137 MMOL/L (ref 136–145)
SP GR UR STRIP: 1.03 (ref 1.01–1.02)
URATE CRY URNS QL MICRO: (no result)
UROBILINOGEN UR STRIP-MCNC: 0.2 MG/DL (ref 0.2–1)
WBC #/AREA URNS HPF: (no result) /HPF (ref 0–5)

## 2018-07-12 PROCEDURE — 86256 FLUORESCENT ANTIBODY TITER: CPT

## 2018-07-12 PROCEDURE — 81001 URINALYSIS AUTO W/SCOPE: CPT

## 2018-07-12 PROCEDURE — 85025 COMPLETE CBC W/AUTO DIFF WBC: CPT

## 2018-07-12 PROCEDURE — 87340 HEPATITIS B SURFACE AG IA: CPT

## 2018-07-12 PROCEDURE — 80053 COMPREHEN METABOLIC PANEL: CPT

## 2018-07-12 PROCEDURE — 36415 COLL VENOUS BLD VENIPUNCTURE: CPT

## 2018-07-12 PROCEDURE — 84550 ASSAY OF BLOOD/URIC ACID: CPT

## 2018-07-12 PROCEDURE — 74022 RADEX COMPL AQT ABD SERIES: CPT

## 2018-07-12 PROCEDURE — 99284 EMERGENCY DEPT VISIT MOD MDM: CPT

## 2018-07-12 PROCEDURE — 74174 CTA ABD&PLVS W/CONTRAST: CPT

## 2018-07-12 PROCEDURE — 83690 ASSAY OF LIPASE: CPT

## 2018-07-12 PROCEDURE — 82784 ASSAY IGA/IGD/IGG/IGM EACH: CPT

## 2018-07-12 PROCEDURE — 96361 HYDRATE IV INFUSION ADD-ON: CPT

## 2018-07-12 PROCEDURE — 83605 ASSAY OF LACTIC ACID: CPT

## 2018-07-12 PROCEDURE — 74177 CT ABD & PELVIS W/CONTRAST: CPT

## 2018-07-12 PROCEDURE — 85651 RBC SED RATE NONAUTOMATED: CPT

## 2018-07-12 PROCEDURE — 83516 IMMUNOASSAY NONANTIBODY: CPT

## 2018-07-12 PROCEDURE — 86140 C-REACTIVE PROTEIN: CPT

## 2018-07-12 RX ADMIN — DICYCLOMINE HYDROCHLORIDE SCH MG: 10 CAPSULE ORAL at 21:00

## 2018-07-12 RX ADMIN — SODIUM CHLORIDE SCH MLS/HR: 9 INJECTION, SOLUTION INTRAVENOUS at 15:25

## 2018-07-12 RX ADMIN — DICYCLOMINE HYDROCHLORIDE SCH MG: 10 CAPSULE ORAL at 15:17

## 2018-07-12 RX ADMIN — TEMAZEPAM SCH MG: 15 CAPSULE ORAL at 21:00

## 2018-07-12 RX ADMIN — METHYLPREDNISOLONE SODIUM SUCCINATE SCH MG: 40 INJECTION, POWDER, LYOPHILIZED, FOR SOLUTION INTRAMUSCULAR; INTRAVENOUS at 22:00

## 2018-07-12 RX ADMIN — FAMOTIDINE SCH MG: 10 INJECTION, SOLUTION INTRAVENOUS at 17:38

## 2018-07-12 NOTE — DIAGNOSTIC IMAGING REPORT
PROCEDURE:X-RAY ABDOMEN, ACUTE SERIES

 

COMPARISON:CT abdomen/pelvis 5/29/18.

 

INDICATIONS:EPIGASTRIC PAIN

 

FINDINGS: There are dilated small bowel loops which measure up to 3.7 

cm. There is air within nondilated large bowel loops. Multiple 

air-fluid levels are seen on the upright radiograph. Findings are 

consistent with partial small bowel obstruction. No evidence of free 

air.

 

Calcifications in the right lower quadrant represent calcified lymph 

nodes as seen on prior CT. Single AP radiograph of the chest 

demonstrates clear lungs and unremarkable cardiomediastinal silhouette. 

Atherosclerotic calcifications are present in the aortic arch. No acute 

bony findings.

 

CONCLUSION:

Findings consistent with partial small bowel obstruction. No evidence 

of free air. 

 

Clear lungs.

 

Dictated by:  KORIN CONDE M.D. on 7/12/2018 at 10:08     

Electronically approved by:  KORIN CONDE M.D. on 7/12/2018 at 10:08

## 2018-07-12 NOTE — DIAGNOSTIC IMAGING REPORT
PROCEDURE: CT ABDOMEN AND PELVIS WITH CONTRAST

 

TECHNIQUE: 

The abdomen and pelvis were scanned utilizing a multidetector helical 

scanner from the diaphragm to the lesser trochanter after the IV 

administration of 100 cc of Isovue 370 and the oral administration of 

dilute Gastrografin.  Coronal and sagittal multiplanar reformations 

were obtained.

 

COMPARISON: [facility], CT, CT ABDOMEN/PELVIS W, 

6/06/2016, 11:37.  [facility], CT, CT ABDOMEN/PELVIS W, 

1/10/2018, 5:09.  [facility], CT, CT ABDOMEN/PELVIS W, 

5/29/2018, 13:48.

 

INDICATIONS:   ABDOMINAL PAIN. R/O OBSTRUCTION

 

FINDINGS:

LOWER THORAX: Linear atelectatic changes in the posterior right lower 

lobe (series 2, image 11). Stable moderate hiatal hernia. Mild 

cardiomegaly.

 

HEPATOBILIARY: No focal hepatic lesions. Decreased prominence of the 

central intrahepatic bile ducts. Stable mild dilation of the common 

bile duct, which measures approximately 7 mm at the bradley hepatis. 

Stable gallstones, with the largest measuring approximate 7 mm in the 

dependent portion of the gallbladder lumen. No wall thickening.

SPLEEN: No splenomegaly.

PANCREAS: No focal masses or ductal dilatation. Stable duodenal 

diverticulum between the second portion of the duodenum and pancreatic 

head (series 2 image 33).

 

ADRENALS: No adrenal nodules.

KIDNEYS/URETERS: No hydronephrosis, stones, or solid mass lesions.

PELVIC ORGANS/BLADDER: Bladder is moderately distended, without focal 

lesions. Uterus is not visualized. No adnexal masses.

 

PERITONEUM / RETROPERITONEUM: Small amount of free fluid in the 

posterior pelvis (series 2, image 71). No free air.

LYMPH NODES: Multiple enlarged lymph nodes are again noted in the 

central mesentery, which measure 1.1 cm in short axis (series 2, images 

51, 53). There are multiple prominent mesenteric lymph nodes which 

measure approximately 8-9 mm, similar to prior exam, particularly along 

the ileocolic vessel distribution.

No retroperitoneal, pelvic, or inguinal adenopathy.

VESSELS: Moderate calcification of the aorta and iliac vessels. Celiac 

trunk, superior and inferior mesenteric, and bilateral renal arteries 

are patent. Portal, superior mesenteric, and splenic veins are patent.

 

GI TRACT: Multiple loops of moderately dilated small bowel involving 

predominantly the ileum are noted in the anterior peritoneum, with a 

maximal measurement of approximately 3.2 cm (for example series 2, 

images 41 and 53).

Several loops of distal ileum show moderate wall thickening, prominent 

wall enhancement and increased vascularity/prominence of the vasa 

recta, extending to the terminal ileum (for example, series 2, images 

65, 70 and sagittal image 47 and coronal image

 35). 

No obstructing mass.

Jejunum, stomach, and duodenum are unremarkable.

Large bowel is mostly decompressed and unremarkable. 

 

BONES AND SOFT TISSUES: No aggressive lytic lesions. Soft tissues are 

grossly unremarkable..

 

IMPRESSION:

 

1. Findings consistent with enteritis involving the ilium and extending 

to the terminal ileum, similar in appearance to prior exams dated 

5/29/2018, 6/6/2016 and 1/10/2018. Given the recurrent nature of the 

findings, inflammatory bowel disease is a primary consideration 

(particularly Crohn's disease) despite the patient's age. Infectious 

enteritis is less likely. No evidence of perforation or adjacent 

abscess formation.

2. Multiple loops of moderately dilated small bowel, without a definite 

transition point, likely representing a reactive ileus.

3. Prominent mesenteric nodes, likely reactive.

4. Small amount of free fluid in the pelvis, likely reactive.

5. Stable cholelithiasis and mild dilation of the common bile duct. No 

CT evidence of cholecystitis.

6. Stable moderate hiatal hernia. 

 

 

Maxwell Barnett M.D.  

Dictated by:  Maxwell Barnett M.D. on 7/12/2018 at 13:11     

Electronically approved by:  Maxwell Barnett M.D. on 7/12/2018 at 

13:11

## 2018-07-13 VITALS — DIASTOLIC BLOOD PRESSURE: 69 MMHG | SYSTOLIC BLOOD PRESSURE: 149 MMHG

## 2018-07-13 VITALS — DIASTOLIC BLOOD PRESSURE: 76 MMHG | SYSTOLIC BLOOD PRESSURE: 150 MMHG

## 2018-07-13 VITALS — SYSTOLIC BLOOD PRESSURE: 152 MMHG | DIASTOLIC BLOOD PRESSURE: 70 MMHG

## 2018-07-13 VITALS — DIASTOLIC BLOOD PRESSURE: 70 MMHG | SYSTOLIC BLOOD PRESSURE: 152 MMHG

## 2018-07-13 VITALS — SYSTOLIC BLOOD PRESSURE: 139 MMHG | DIASTOLIC BLOOD PRESSURE: 72 MMHG

## 2018-07-13 LAB
ALBUMIN SERPL-MCNC: 2.6 G/DL (ref 3.5–5)
ALBUMIN/GLOB SERPL: 1 {RATIO} (ref 0.8–2)
ALP SERPL-CCNC: 54 IU/L (ref 40–150)
ALT SERPL-CCNC: < 6 IU/L (ref 0–55)
ANION GAP SERPL CALC-SCNC: 11.4 MMOL/L (ref 8–16)
BASOPHILS # BLD AUTO: 0 10*3/UL (ref 0–0.1)
BASOPHILS NFR BLD AUTO: 0.1 % (ref 0–1)
BUN SERPL-MCNC: 9 MG/DL (ref 7–26)
BUN/CREAT SERPL: 13 (ref 6–25)
CALCIUM SERPL-MCNC: 7.8 MG/DL (ref 8.4–10.2)
CHLORIDE SERPL-SCNC: 108 MMOL/L (ref 98–107)
CO2 SERPL-SCNC: 21 MMOL/L (ref 22–29)
DEPRECATED NEUTROPHILS # BLD AUTO: 7.7 10*3/UL (ref 2.1–6.9)
EGFRCR SERPLBLD CKD-EPI 2021: > 60 ML/MIN (ref 60–?)
EOSINOPHIL # BLD AUTO: 0 10*3/UL (ref 0–0.4)
EOSINOPHIL NFR BLD AUTO: 0 % (ref 0–6)
ERYTHROCYTE [DISTWIDTH] IN CORD BLOOD: 17.2 % (ref 11.7–14.4)
GLOBULIN PLAS-MCNC: 2.7 G/DL (ref 2.3–3.5)
GLUCOSE SERPLBLD-MCNC: 181 MG/DL (ref 74–118)
HCT VFR BLD AUTO: 32.4 % (ref 34.2–44.1)
HGB BLD-MCNC: 10.2 G/DL (ref 12–16)
LYMPHOCYTES # BLD: 0.5 10*3/UL (ref 1–3.2)
LYMPHOCYTES NFR BLD AUTO: 5.5 % (ref 18–39.1)
MCH RBC QN AUTO: 27.7 PG (ref 28–32)
MCHC RBC AUTO-ENTMCNC: 31.5 G/DL (ref 31–35)
MCV RBC AUTO: 88 FL (ref 81–99)
MONOCYTES # BLD AUTO: 0.3 10*3/UL (ref 0.2–0.8)
MONOCYTES NFR BLD AUTO: 3.3 % (ref 4.4–11.3)
NEUTS SEG NFR BLD AUTO: 90.9 % (ref 38.7–80)
PLATELET # BLD AUTO: 249 X10E3/UL (ref 140–360)
POTASSIUM SERPL-SCNC: 4.4 MMOL/L (ref 3.5–5.1)
RBC # BLD AUTO: 3.68 X10E6/UL (ref 3.6–5.1)
SODIUM SERPL-SCNC: 136 MMOL/L (ref 136–145)

## 2018-07-13 RX ADMIN — RIFAXIMIN SCH MG: 200 TABLET ORAL at 21:48

## 2018-07-13 RX ADMIN — SODIUM CHLORIDE SCH MLS/HR: 9 INJECTION, SOLUTION INTRAVENOUS at 00:50

## 2018-07-13 RX ADMIN — METHYLPREDNISOLONE SODIUM SUCCINATE SCH MG: 40 INJECTION, POWDER, LYOPHILIZED, FOR SOLUTION INTRAMUSCULAR; INTRAVENOUS at 21:48

## 2018-07-13 RX ADMIN — FAMOTIDINE SCH MG: 10 INJECTION, SOLUTION INTRAVENOUS at 17:46

## 2018-07-13 RX ADMIN — DICYCLOMINE HYDROCHLORIDE SCH MG: 10 CAPSULE ORAL at 21:48

## 2018-07-13 RX ADMIN — DICYCLOMINE HYDROCHLORIDE SCH MG: 10 CAPSULE ORAL at 08:37

## 2018-07-13 RX ADMIN — FAMOTIDINE SCH MG: 10 INJECTION, SOLUTION INTRAVENOUS at 08:36

## 2018-07-13 RX ADMIN — METRONIDAZOLE SCH MG: 500 TABLET ORAL at 21:48

## 2018-07-13 RX ADMIN — TEMAZEPAM SCH MG: 15 CAPSULE ORAL at 21:48

## 2018-07-13 RX ADMIN — MESALAMINE SCH MG: 400 TABLET, DELAYED RELEASE ORAL at 08:37

## 2018-07-13 RX ADMIN — SODIUM CHLORIDE SCH MLS/HR: 9 INJECTION, SOLUTION INTRAVENOUS at 16:16

## 2018-07-13 RX ADMIN — METHYLPREDNISOLONE SODIUM SUCCINATE SCH MG: 40 INJECTION, POWDER, LYOPHILIZED, FOR SOLUTION INTRAMUSCULAR; INTRAVENOUS at 14:21

## 2018-07-13 RX ADMIN — DICYCLOMINE HYDROCHLORIDE SCH MG: 10 CAPSULE ORAL at 14:21

## 2018-07-13 RX ADMIN — SODIUM CHLORIDE SCH MLS/HR: 9 INJECTION, SOLUTION INTRAVENOUS at 10:50

## 2018-07-13 RX ADMIN — METRONIDAZOLE SCH MG: 500 TABLET ORAL at 14:21

## 2018-07-13 RX ADMIN — METHYLPREDNISOLONE SODIUM SUCCINATE SCH MG: 40 INJECTION, POWDER, LYOPHILIZED, FOR SOLUTION INTRAMUSCULAR; INTRAVENOUS at 06:00

## 2018-07-13 NOTE — HISTORY AND PHYSICAL
PRIMARY CARE PHYSICIAN:  Dr. Barrios



CHIEF COMPLAINT:  Abdominal pain.



HISTORY OF PRESENT ILLNESS:  A 77-year-old woman who developed midabdominal 

pain prompting visit to the hospital.  She admits to being constipated as 

well.  Here she was found to have a partial small-bowel obstruction/ileus 

and enteritis.  She is admitted for further evaluation and management.



PAST MEDICAL HISTORY:  E. coli urinary tract infection, severe hypokalemia, 

acute gastroenteritis, recurrent intestinal bleeding, severe sepsis, atrial 

fibrillation, diarrhea, normocytic anemia, non-ST-elevation MI, depression, 

C. difficile colitis, iron deficiency anemia, sepsis, hypomagnesemia, 

hypokalemia, acute enteritis.



PAST SURGICAL HISTORY:  None.



ALLERGIES:  PER ELECTRONIC MEDICAL RECORD.



FAMILY HISTORY/SOCIAL HISTORY:  Patient lives alone and is .  

History of alcohol but quit.  No illicits or cigarettes.



MEDICATIONS:  Per electronic medical record.



REVIEW OF SYSTEMS:  Denies any dizziness, chest pain, shortness of breath, 

fever, chills, sweats, nausea, vomiting, diarrhea, leg pain, or back pain.



PHYSICAL EXAMINATION 

VITAL SIGNS:  Have been reviewed. 

GENERAL:  A tired-appearing woman resting in bed. 

HEENT:  Anicteric.  Pupils respond to light.  No oral lesions.

CARDIOVASCULAR:  Normal S1 and S2.  

LUNGS:  Moderate breath sounds.  

ABDOMEN:  Soft and nondistended.  Minimal tenderness in the midabdomen.  

EXTREMITIES:  No edema or calf tenderness.

NEUROLOGICAL:  Alert and oriented times 3.  Moving all extremities.

SKIN:  Dry.

PSYCHIATRIC:  Normal affect.



LABS:  Reviewed.



MEDICATIONS:  Reviewed.



ASSESSMENT:  A 77-year-old woman with:

1.  Partial small-bowel obstruction/ileus.

2.  Acute enteritis.

3.  Cholelithiasis.

4.  Atrial fibrillation. 

5.  Depression.

6.  Normocytic anemia.

7.  Metabolic acidosis.  



PLAN 

1.  Will treat with Flagyl and IV fluids.

2.  Continue steroid treatment.

3.  Consultation with Dr. Kline.

4.  Mesalamine.

5.  Follow up labs.



6.  Use SCD and Pepcid for prophylaxis. 









DD:  07/13/2018 08:12

DT:  07/13/2018 08:14

Job#:  [id number]

## 2018-07-13 NOTE — CONSULTATION
DATE OF CONSULTATION:  July 13, 2018 



GASTROENTEROLOGY CONSULTATION



REASON FOR CONSULTATION:  Chronic midabdominal with recurrent lower 

abdominal pain for more than 4 years.  



HISTORY OF PRESENT ILLNESS:  A 77-year-old female who has been admitted in 

this hospital several times in the last 4 years with midabdominal pain.  

Abdominal pain is 6/10 in intensity, mostly in the lower quadrant, 

nonradiating and intermittent in nature.  Has no exacerbation, exacerbating 

or relieving factor.  It has some relation with meals.  She has noticed 

with some meals she gets more pain than when she is fasting.  She has had 

upper endoscopy, as well as colonoscopy in January this year.  Upper 

endoscopy was unremarkable.  Small bowel biopsy and gastric biopsies were 

also nonspecific.  Small bowel biopsy was normal.  Colonoscopy with 

terminal ileal intubation showed normal colonic mucosa.  Biopsy from the 

terminal ileum was normal.  Random colon biopsies were normal.  On this 

admission, the patient presented with the same screening complaint of 

abdominal pain, which sort of became more recurrent in the last couple of 

days, more intensified.  CT of the abdomen on this admission showed 

terminal ileal dilated loops with some enteritis and ileal wall thickening 

concerning for inflammatory versus ischemic enteritis.  Blood work 

unremarkable with white count of 11.61.  She is not anemic.  Hemoglobin is 

12.2.  Hematocrit 38.4.  She denies any extra intestinal complains.  On 

anticoagulant.



REVIEW OF SYSTEMS:  A 12-point system reviewed.  Symptomatology is limited 

to the GI system.   



PAST SURGICAL HISTORY:  None.





PAST MEDICAL HISTORY:  Recurrent UTI, atrial fibrillation (not on any 

anticoagulants), intermittent diarrhea, coronary artery disease history, 

prior MRI, depression, history of C. difficile colitis.



FAMILY HISTORY:  Negative for any GI or GYN malignancies.



SOCIAL HISTORY:  Lives by herself and .  No smoking, alcohol or any 

illicit drug use. 

ALLERGIES:  NO KNOWN DRUG ALLERGIES. 



HOME MEDICATIONS:  Alendronate, aspirin, calcium, sertraline, 

cholestyramine, ciprofloxacin, dicyclomine, citalopram, famotidine, ferrous 

sulfate, hydralazine, lansoprazole, lisinopril, loperamide, loratadine, 

lovastatin, meloxicam, mesalamine, metronidazole, nifedipine, Zofran, 

pantoprazole, potassium chloride, prednisone, temazepam, and apixaban.



PHYSICAL EXAMINATION

VITAL SIGNS:  Temperature 96.4, pulse 76, respirations 18, blood pressure 

150/76, oxygen saturation 95% on room air. 

GENERAL:  Not in any acute distress.  

HEENT:  Oral mucosa is moist.  Anicteric sclerae. 

NECK:  No neck or axillary adenopathy.  

CV:  S1 and S2 regular. 

LUNGS:  Bilaterally grossly clear. 

ABDOMEN:  Soft.  Mild lower quadrant tenderness on deep palpation without 

rebound, rigidity or guarding.  No palpable mass or hernia. 

EXTREMITIES:  Warm.  No leg edema. 



LABS:  WBC 8.5, hemoglobin 10.2, hematocrit 32.4, MCV 88, platelet count 

249,000.  Sodium 136, potassium 4.4, chloride 108, bicarb 21, BUN 9, 

creatinine 0.67, glucose 181.  Liver enzymes showed total bilirubin 0.6, 

AST 10, ALT 6, alkaline phosphatase 54.  CT of the abdomen and pelvis with 

IV and oral contrast showed finding consistent with:

1.  Enteritis involving the ileum and extending to the terminal ileum, 

similar in appearance to prior exam dated May 29, 2018, June 6, 2018, and 

January 10, 2018.  Given the recurrent nature of the findings, inflammatory 

bowel disease is a primary consideration (particularly Crohn's disease), 

despite the patient's age.  Infectious enteritis is less likely.  No 

evidence of perforation or and adjacent abscess.  

2.  Multiple loops of moderately dilated small bowel without definite 

transition point, likely representing reactive ileus.

3.  Prominent mesenteric lymph node, likely reactive.

4.  Small amount of free fluid in the pelvis, likely reactive.

5.  Stable cholelithiasis and mild dilatation of common bile duct.  No CT 

evidence of cholecystitis.

6.  Stable moderate hiatal hernia.



IMPRESSION:  Chronic abdominal pain, which is intermittent in nature with 

off and on diarrhea.  Computerized tomography finding which described above 

is quite chronic.



PLAN:  Continue present medical management.  Supportive care.  Check 

inflammatory marker, such as sed rate or C-reactive protein.  If the 

patient develops any diarrhea, then send the stool studies for culture, 

wbcs lactoferrin, calprotectin, rule out C. diff as well.  I strongly 

suspect the patient may be having chronic mesenteric ischemia causing this 

abdominal pain.  However, her symptoms are not classic.  Usually, a patient 

develops postprandial abdominal pain in mesenteric ischemia with a profound 

weight loss.  Patient's are usually afraid of eating.  However, given the 

chronicity without much diarrhea, I will entertain CT angiogram of the 

abdomen to demonstrate the anatomy of the vascularity of the distal 

structure.  Outpatient small bowel capsule exam that was also done will 

also give a better picture of small bowel mucosa.  Surgical consult will 

also be helpful.



Will continue to monitor her clinically.  





I thank Dr. Josh Camara for allowing me to participate in the care of this 

patient.



 





DD:  07/13/2018 19:22

DT:  07/13/2018 19:39

Job#:  [id number]

## 2018-07-13 NOTE — PROGRESS NOTE
DATE:  July 13, 2018 



SUBJECTIVE:  Patient reports diffuse lower abdominal pain.  She has had a 

few loose stools today.  No blood in the stool.



REVIEW OF SYSTEMS   

GENERAL:  No fever or chills. 

RESPIRATORY:  No cough or expectoration. 

CVS:  No chest pain, palpitations. 



MEDICATIONS:  Reviewed MAR.  She is being given Solu-Medrol 40 mg IV q.8 h.



PHYSICAL EXAMINATION   

VITAL SIGNS:  Temperature 96.4, pulse 76, respiration 18, blood pressure 

150/76, oxygen saturation 95% on room air. 

GENERAL:  Not in any acute distress. 

HEENT:  Oral mucosa is moist.  Anicteric sclerae. 

CVS:  S1, S2 regular. 

LUNGS:  Bilaterally grossly clear. 

ABDOMEN:  Soft, mild lower quadrant tenderness on deep palpation, without 

rebound, rigidity or guarding.  Positive bowel sound. 

EXTREMITIES:  Warm.  No leg edema. 



LABS:  WBC 10.2 down from 12.2, hemoglobin 8.5 from 11.61, hematocrit 32.4, 

platelet count 249,000.  Chemistry, sodium 136, potassium 4.4, chloride 

108, bicarb 21, BUN 9, creatinine 0.67, glucose 181.  Liver enzymes normal.



ASSESSMENT

1. Chronic lower abdominal pain, with off and on diarrhea.  Lower 

abdominal bloating especially after eating.  CT showing distal small 

bowel thickening, which is chronic.  Prometheus test has been done in 

the office by Dr. Kline that is highly suspicious for inflammatory 

bowel disease.  C-reactive protein is pending.  Patient is empirically 

being treated with the steroid on the line of acute exacerbation of 

Crohn's disease.

2. I suspect possibility of chronic mesenteric ischemia; therefore, CT 

angiogram of the abdomen and pelvis is being ordered.

3. There is a possibility of bacterial overgrowth.  Therefore, give her 

a trial of Xifaxan.



PLAN:  Continue present care.  Obtain surgical consult.  Add sed rate.



My partner, Dr. Duarte, will follow the patient tomorrow.









DD:  07/13/2018 20:12

DT:  07/13/2018 20:19

Job#:  [id number]

## 2018-07-13 NOTE — CONSULTATION
DATE OF CONSULTATION:  July 13, 2018 



CHIEF COMPLAINT:  Abdominal distention.



HISTORY OF PRESENT ILLNESS:  The patient is 77-year-old female with history 

of recurrent abdominal distention with constipations in the last 3 weeks, 

worsening in the last few days.  She denies vomiting, fever, chills, or 

diarrhea.



PAST MEDICAL HISTORY:  Significant for atrial fibrillation, chronic anemia, 

hyperlipidemia, hypertension, history of MI, depression.



SURGICAL HISTORY:  Positive for hysterectomy.



SOCIAL HABITS:  No smoking or alcohol abuse.



REVIEW OF SYSTEMS:  No chest pain, shortness of breath, or cough.



ALLERGIES:  NO DRUG ALLERGIES.



PHYSICAL EXAMINATION:

VITAL SIGNS:  Stable, afebrile.

GENERAL:  Patient is awake, alert, in mild discomfort.

HEENT:  Sclerae anicteric.

NECK:  Supple.

LUNGS:  Clear.

HEART:  Irregular rate and rhythm.  No murmurs.

ABDOMEN:  Mild to moderate distention with mild guarding in the 

periumbilical area without rebound.

EXTREMITIES:  Without cyanosis or edema.



LABORATORY DATA:  White cell count is 8.5, creatinine is 0.7.  Liver 

function tests within normal limits.  CT of the abdomen showed evidence of 

enteritis involving the terminal ileum.  There are multiple loops of 

moderately dilated small bowel without transitional point suggesting ileus. 





ASSESSMENT:  Chronic recurrent abdominal distention with small bowel 

dilatation without obvious transitional point.  This could represent a 

partial obstruction from adhesions.



PLAN:  Monitor abdominal exam and x-ray.  Repeat CT scan if symptoms 

worsen.



Thank you for consultation.







DD:  07/13/2018 20:46

DT:  07/13/2018 20:50

Job#:  [id number]

## 2018-07-14 VITALS — DIASTOLIC BLOOD PRESSURE: 84 MMHG | SYSTOLIC BLOOD PRESSURE: 176 MMHG

## 2018-07-14 VITALS — DIASTOLIC BLOOD PRESSURE: 82 MMHG | SYSTOLIC BLOOD PRESSURE: 156 MMHG

## 2018-07-14 VITALS — DIASTOLIC BLOOD PRESSURE: 80 MMHG | SYSTOLIC BLOOD PRESSURE: 166 MMHG

## 2018-07-14 VITALS — SYSTOLIC BLOOD PRESSURE: 166 MMHG | DIASTOLIC BLOOD PRESSURE: 80 MMHG

## 2018-07-14 VITALS — SYSTOLIC BLOOD PRESSURE: 176 MMHG | DIASTOLIC BLOOD PRESSURE: 84 MMHG

## 2018-07-14 VITALS — SYSTOLIC BLOOD PRESSURE: 140 MMHG | DIASTOLIC BLOOD PRESSURE: 67 MMHG

## 2018-07-14 VITALS — DIASTOLIC BLOOD PRESSURE: 83 MMHG | SYSTOLIC BLOOD PRESSURE: 179 MMHG

## 2018-07-14 RX ADMIN — TEMAZEPAM SCH MG: 15 CAPSULE ORAL at 22:00

## 2018-07-14 RX ADMIN — HYDRALAZINE HYDROCHLORIDE SCH MG: 25 TABLET ORAL at 16:45

## 2018-07-14 RX ADMIN — METHYLPREDNISOLONE SODIUM SUCCINATE SCH MG: 40 INJECTION, POWDER, LYOPHILIZED, FOR SOLUTION INTRAMUSCULAR; INTRAVENOUS at 13:37

## 2018-07-14 RX ADMIN — RIFAXIMIN SCH MG: 200 TABLET ORAL at 21:00

## 2018-07-14 RX ADMIN — DICYCLOMINE HYDROCHLORIDE SCH MG: 10 CAPSULE ORAL at 08:57

## 2018-07-14 RX ADMIN — SODIUM CHLORIDE SCH MLS/HR: 9 INJECTION, SOLUTION INTRAVENOUS at 02:50

## 2018-07-14 RX ADMIN — FAMOTIDINE SCH MG: 10 INJECTION, SOLUTION INTRAVENOUS at 16:47

## 2018-07-14 RX ADMIN — FAMOTIDINE SCH MG: 10 INJECTION, SOLUTION INTRAVENOUS at 08:57

## 2018-07-14 RX ADMIN — METRONIDAZOLE SCH MG: 500 TABLET ORAL at 13:37

## 2018-07-14 RX ADMIN — METRONIDAZOLE SCH MG: 500 TABLET ORAL at 05:28

## 2018-07-14 RX ADMIN — Medication SCH MG: at 16:47

## 2018-07-14 RX ADMIN — DICYCLOMINE HYDROCHLORIDE SCH MG: 10 CAPSULE ORAL at 14:13

## 2018-07-14 RX ADMIN — DICYCLOMINE HYDROCHLORIDE SCH MG: 10 CAPSULE ORAL at 21:00

## 2018-07-14 RX ADMIN — HYDRALAZINE HYDROCHLORIDE SCH MG: 25 TABLET ORAL at 21:00

## 2018-07-14 RX ADMIN — RIFAXIMIN SCH MG: 200 TABLET ORAL at 08:57

## 2018-07-14 RX ADMIN — METHYLPREDNISOLONE SODIUM SUCCINATE SCH MG: 40 INJECTION, POWDER, LYOPHILIZED, FOR SOLUTION INTRAMUSCULAR; INTRAVENOUS at 22:00

## 2018-07-14 RX ADMIN — MESALAMINE SCH MG: 400 TABLET, DELAYED RELEASE ORAL at 08:57

## 2018-07-14 RX ADMIN — METRONIDAZOLE SCH MG: 500 TABLET ORAL at 22:00

## 2018-07-14 RX ADMIN — SODIUM CHLORIDE SCH MLS/HR: 9 INJECTION, SOLUTION INTRAVENOUS at 16:45

## 2018-07-14 RX ADMIN — METHYLPREDNISOLONE SODIUM SUCCINATE SCH MG: 40 INJECTION, POWDER, LYOPHILIZED, FOR SOLUTION INTRAMUSCULAR; INTRAVENOUS at 05:28

## 2018-07-14 NOTE — DIAGNOSTIC IMAGING REPORT
EXAM: CTA ABD/PELVIS

DATE: 7/14/2018 2:52 AM  

INDICATION:  \S\CT Angio of ABD/Pelvis r/o mesenteric ischemia \S\[id number]

\S\0350 \S\Y

COMPARISON:  7.12.18

TECHNIQUE: The abdomen and pelvis were scanned using a multidetector helical

scanner. Coronal and sagittal reformations were obtained. CT angiogram

mesenteric ischemia protocol was performed.

IV Contrast: 100 ml Isovue 300/370



FINDINGS:

Evaluation of abdominal pelvic organs is somewhat degraded due to arterial

phase imaging.

LOWER THORAX: Increased small effusions with associated atelectasis.



LIVER/BILIARY:  No masses.  No ductal dilatation.



GALLBLADDER: Cholelithiasis.

SPLEEN: Unremarkable

PANCREAS: Unremarkable



ADRENALS: No nodules

KIDNEYS: Symmetric perfusion. No enhancing masses. No hydronephrosis.



GI TRACT: Stable moderate hiatal hernia. Persistent distal/ileal small bowel

wall segmental thickening and hyperemia, seen over multiple priors. Interval

improvement in small bowel dilatation.



VESSELS: Aorta, iliac and visualized femoral arteries are patent, normal

caliber. Moderate calcified and noncalcified atherosclerotic plaque of the

abdominal aorta and common iliac arteries, mild of the external and internal

iliac arteries. Celiac axis, SMA, bilateral renal arteries, and SHIV are patent,

normal caliber.

PERITONEUM/RETROPERITONEUM: Mild abdominal and mesenteric edema/free fluid,

decreased from prior. No free air.

LYMPH NODES: Scattered prominent mesenteric lymph nodes, likely reactive.



REPRODUCTIVE ORGANS/BLADDER: Hysterectomy. Otherwise unremarkable.



SOFT TISSUES: Unremarkable

BONES: No suspicious bone lesions.



IMPRESSION:

1. Enteritis, similar in appearance and location over multiple priors from

2014.  Favor inflammatory etiology such as Crohn's.  Improved small bowel

dilation and free fluid/edema from most recent prior.

2. Central mesenteric arteries are patent, normal caliber.



Signed by: Dr Ansley Lu MD on 7/14/2018 5:02 AM

## 2018-07-15 VITALS — DIASTOLIC BLOOD PRESSURE: 78 MMHG | SYSTOLIC BLOOD PRESSURE: 170 MMHG

## 2018-07-15 VITALS — SYSTOLIC BLOOD PRESSURE: 156 MMHG | DIASTOLIC BLOOD PRESSURE: 76 MMHG

## 2018-07-15 VITALS — DIASTOLIC BLOOD PRESSURE: 77 MMHG | SYSTOLIC BLOOD PRESSURE: 159 MMHG

## 2018-07-15 VITALS — SYSTOLIC BLOOD PRESSURE: 138 MMHG

## 2018-07-15 RX ADMIN — METHYLPREDNISOLONE SODIUM SUCCINATE SCH MG: 40 INJECTION, POWDER, LYOPHILIZED, FOR SOLUTION INTRAMUSCULAR; INTRAVENOUS at 14:00

## 2018-07-15 RX ADMIN — METRONIDAZOLE SCH MG: 500 TABLET ORAL at 14:00

## 2018-07-15 RX ADMIN — METRONIDAZOLE SCH MG: 500 TABLET ORAL at 05:23

## 2018-07-15 RX ADMIN — SODIUM CHLORIDE SCH MLS/HR: 9 INJECTION, SOLUTION INTRAVENOUS at 12:50

## 2018-07-15 RX ADMIN — METHYLPREDNISOLONE SODIUM SUCCINATE SCH MG: 40 INJECTION, POWDER, LYOPHILIZED, FOR SOLUTION INTRAMUSCULAR; INTRAVENOUS at 05:23

## 2018-07-15 RX ADMIN — HYDRALAZINE HYDROCHLORIDE SCH MG: 25 TABLET ORAL at 08:28

## 2018-07-15 RX ADMIN — SODIUM CHLORIDE SCH MLS/HR: 9 INJECTION, SOLUTION INTRAVENOUS at 02:50

## 2018-07-15 RX ADMIN — RIFAXIMIN SCH MG: 200 TABLET ORAL at 08:43

## 2018-07-15 RX ADMIN — DICYCLOMINE HYDROCHLORIDE SCH MG: 10 CAPSULE ORAL at 08:28

## 2018-07-15 RX ADMIN — MESALAMINE SCH MG: 400 TABLET, DELAYED RELEASE ORAL at 08:29

## 2018-07-15 RX ADMIN — FAMOTIDINE SCH MG: 10 INJECTION, SOLUTION INTRAVENOUS at 08:27

## 2018-07-15 RX ADMIN — Medication SCH MG: at 08:30

## 2018-07-15 NOTE — DISCHARGE SUMMARY
PRINCIPAL DIAGNOSES 

1.  Acute enteritis.

2.  Cholelithiasis.

3.  Atrial fibrillation.

4.  Depression.

5.  Normocytic anemia.

6.  Metabolic acidosis.  



SECONDARY DIAGNOSES 

1.  Escherichia coli urinary tract infection. 

2.  Severe hypokalemia.

3.  Acute gastroenteritis.

4.  Recurrent intestinal bleeding.

5.  Severe sepsis.

6.  Atrial fibrillation. 

7.  Diarrhea.

8.  Normocytic anemia.

9.  Non-ST-segment elevation myocardial infarction.

10. Depression.

11. Clostridium difficile colitis.

12. Iron deficiency anemia.

13. Sepsis.

14. Hypomagnesemia. 

15. Hypokalemia.

16. Acute enteritis.



CHIEF COMPLAINT:  This 77-year-old woman developed midabdominal pain on the 

day she was admitted.  The patient presented with report of constipation as 

well.  



HOSPITAL COURSE:  Upon admission, the patient was found to have partial 

small-bowel obstruction, ileus and enteritis, and was admitted for workup 

with IV fluids, Flagyl, steroid treatments, GI consult, and mesalamine, and 

followup labs.  Consult was obtained the same day at which time GI obtained 

surgical consult and sed rate, and a trial of Xifaxan.  On that day the 

evening of July 13, 2018, surgical consult was obtained at which time 

surgery recommended to monitor abdominal exam and x-ray.  Repeat CT scan if 

symptoms worsened.  CT was performed on the early a.m. of July 14, 2018.  

She was found to have positive enteritis similar in appearance and location 

as prior imaging all the way back to 2014.  Positive inflammatory etiology 

indicative of Crohn.  However, had improved small bowel dilation and 

improved free fluid edema from previous imaging.  Of note, the central 

mesenteric arteries were patent with normal caliber.  Thus, surgery 

deferred further intervention depending on abdominal exam.  Over the course 

of the next 24 hours following imaging, symptoms improved dramatically per 

the patient and per provider exams.  GI continued with recommendation of 

anti-infectives and considered enteritis most likely Crohn disease.  On the 

day, July 15, 2018, the patient was found to have no fever, marked 

reduction in abdominal symptoms, ambulating ad kayode, and advancing diet as 

tolerated with flatus and 1 small bowel movement this day per RN.  



DISCHARGE MEDICATIONS:  New prescriptions include:

1.  Dicyclomine 10 mg p.o. t.i.d. 

2.  Rifaximin 200 mg tablets 2.5 tablets or 500 mg p.o. q.12 h. 

3.  Mesalamine 400 mg p.o. once daily. 

4.  The patient is to continue her home medications which include _______ 

sodium 70 mg p.o. once daily as needed for mild pain.

5.  Aspirin 81 mg. 

6.  Citracal plus D-max once daily.

7.  Sertraline 10 mg p.o. daily.

8.  Questran packet twice daily.

9.  Escitalopram oxalate 10 mg tab once daily.

10. Pepcid 20 mg b.i.d. 

11. Ferrous sulfate 325 mg once daily.

12. Hydralazine 25 mg q.8 h.

13. Prevacid 30 mg p.o. daily.

14. Lisinopril 10 mg p.o. daily.

15. Loperamide as needed p.r.n. diarrhea.

16. Loratadine 10 mg p.o. daily.

17. Lovastatin 20 mg p.o. daily. 

18. Meloxicam 7.5 mg once daily.

19. Nifedipine 30 mg tabs ER once every 12 hours.

20. Zofran q.6 h. as needed.

21. Protonix 40 mg once daily.

22. Supplemental potassium chloride 20 mEq every day.

23. Prednisone 20 mg tablets once daily.

24. Restoril at night 50 mg.

25. Detrol LA 4 mg daily.

26. Eliquis 5 mg p.o. q.12 h. 



FOLLOWUP/REFERRALS:  The patient was to notify Dr. Julian Barrios, PCP, for 

followup with him in 5-7 days, and to notify gastroenterology, Dr. Manuel Fontaine, tomorrow on Monday for followup appointment within 3 days, or to seek 

care for fever or any other concerns.  



CONDITION ON DISCHARGE:  Improved.



DICTATED BY JOSSE SWAN, TANA



 



 _________________________________

FELECIA ALLISON MD



DD:  07/15/2018 12:45

DT:  07/15/2018 16:40

Job#:  [id number]

## 2019-03-18 ENCOUNTER — HOSPITAL ENCOUNTER (EMERGENCY)
Dept: HOSPITAL 88 - ER | Age: 79
Discharge: HOME | End: 2019-03-18
Payer: COMMERCIAL

## 2019-03-18 VITALS — WEIGHT: 122 LBS | BODY MASS INDEX: 22.45 KG/M2 | HEIGHT: 62 IN

## 2019-03-18 VITALS — DIASTOLIC BLOOD PRESSURE: 72 MMHG | SYSTOLIC BLOOD PRESSURE: 156 MMHG

## 2019-03-18 DIAGNOSIS — R05: Primary | ICD-10-CM

## 2019-03-18 DIAGNOSIS — E78.5: ICD-10-CM

## 2019-03-18 DIAGNOSIS — R00.2: ICD-10-CM

## 2019-03-18 DIAGNOSIS — J20.9: ICD-10-CM

## 2019-03-18 DIAGNOSIS — I10: ICD-10-CM

## 2019-03-18 DIAGNOSIS — R73.9: ICD-10-CM

## 2019-03-18 DIAGNOSIS — K21.9: ICD-10-CM

## 2019-03-18 LAB
ALBUMIN SERPL-MCNC: 3.2 G/DL (ref 3.5–5)
ALBUMIN/GLOB SERPL: 1 {RATIO} (ref 0.8–2)
ALP SERPL-CCNC: 45 IU/L (ref 40–150)
ALT SERPL-CCNC: 6 IU/L (ref 0–55)
ANION GAP SERPL CALC-SCNC: 13 MMOL/L (ref 8–16)
BASOPHILS # BLD AUTO: 0 10*3/UL (ref 0–0.1)
BASOPHILS NFR BLD AUTO: 0.6 % (ref 0–1)
BUN SERPL-MCNC: 8 MG/DL (ref 7–26)
BUN/CREAT SERPL: 11 (ref 6–25)
CALCIUM SERPL-MCNC: 8.4 MG/DL (ref 8.4–10.2)
CHLORIDE SERPL-SCNC: 108 MMOL/L (ref 98–107)
CK MB SERPL-MCNC: 0.8 NG/ML (ref 0–5)
CK SERPL-CCNC: 34 IU/L (ref 29–168)
CO2 SERPL-SCNC: 27 MMOL/L (ref 22–29)
DEPRECATED NEUTROPHILS # BLD AUTO: 2.3 10*3/UL (ref 2.1–6.9)
EGFRCR SERPLBLD CKD-EPI 2021: > 60 ML/MIN (ref 60–?)
EOSINOPHIL # BLD AUTO: 0 10*3/UL (ref 0–0.4)
EOSINOPHIL NFR BLD AUTO: 0.9 % (ref 0–6)
ERYTHROCYTE [DISTWIDTH] IN CORD BLOOD: 16.7 % (ref 11.7–14.4)
GLOBULIN PLAS-MCNC: 3.2 G/DL (ref 2.3–3.5)
GLUCOSE SERPLBLD-MCNC: 150 MG/DL (ref 74–118)
HCT VFR BLD AUTO: 37 % (ref 34.2–44.1)
HGB BLD-MCNC: 11.4 G/DL (ref 12–16)
LYMPHOCYTES # BLD: 0.7 10*3/UL (ref 1–3.2)
LYMPHOCYTES NFR BLD AUTO: 20.7 % (ref 18–39.1)
MCH RBC QN AUTO: 28.8 PG (ref 28–32)
MCHC RBC AUTO-ENTMCNC: 30.8 G/DL (ref 31–35)
MCV RBC AUTO: 93.4 FL (ref 81–99)
MONOCYTES # BLD AUTO: 0.4 10*3/UL (ref 0.2–0.8)
MONOCYTES NFR BLD AUTO: 10.3 % (ref 4.4–11.3)
NEUTS SEG NFR BLD AUTO: 67.2 % (ref 38.7–80)
PLATELET # BLD AUTO: 244 X10E3/UL (ref 140–360)
POTASSIUM SERPL-SCNC: 4 MMOL/L (ref 3.5–5.1)
RBC # BLD AUTO: 3.96 X10E6/UL (ref 3.6–5.1)
SODIUM SERPL-SCNC: 144 MMOL/L (ref 136–145)

## 2019-03-18 PROCEDURE — 84484 ASSAY OF TROPONIN QUANT: CPT

## 2019-03-18 PROCEDURE — 87400 INFLUENZA A/B EACH AG IA: CPT

## 2019-03-18 PROCEDURE — 82550 ASSAY OF CK (CPK): CPT

## 2019-03-18 PROCEDURE — 82553 CREATINE MB FRACTION: CPT

## 2019-03-18 PROCEDURE — 80053 COMPREHEN METABOLIC PANEL: CPT

## 2019-03-18 PROCEDURE — 36415 COLL VENOUS BLD VENIPUNCTURE: CPT

## 2019-03-18 PROCEDURE — 71046 X-RAY EXAM CHEST 2 VIEWS: CPT

## 2019-03-18 PROCEDURE — 85025 COMPLETE CBC W/AUTO DIFF WBC: CPT

## 2019-03-18 PROCEDURE — 99284 EMERGENCY DEPT VISIT MOD MDM: CPT

## 2019-03-18 PROCEDURE — 93005 ELECTROCARDIOGRAM TRACING: CPT

## 2019-03-18 NOTE — XMS REPORT
Summary of Care: 18 - 18

                             Created on: 2115



[employer]

External Reference #: [id number]

: 1940

Sex: Female



Demographics







                          Address                   [address] [place] [address]  [address]

 

                          Home Phone                [phone]

 

                          Preferred Language        English

 

                          Marital Status            

 

                          Taoism Affiliation     Religion

 

                          Race                      Other

 

                                        Additional Race(s)  

 

                          Ethnic Group              /Latin





Author







                          Author                    [facility]

 

                          Organization              [facility]

 

                          Address                   Unknown

 

                          Phone                     Unavailable







Encounter





HQ Encntr_alias(FIN) [id number] Date(s): 18 - 18

[facility] [address] [place] [phone]      [phone]
[phone]

Discharge Disposition: Home or Self Care

Attending Physician: Julian Barrios MD





Vital Signs





No data available for this section



Problem List





No data available for this section



Allergies, Adverse Reactions, Alerts







   



                 Substance       Reaction        Severity        Status

 

   



                           NKDA                      Active







Medications





No data available for this section



Results





No data available for this section



Immunizations





No data available for this section



Procedures





No data available for this section



Social History





No data available for this section



Assessment and Plan





No data available for this section

## 2019-03-18 NOTE — XMS REPORT
Encounter??CCD:??2011??to??2011

                             Created on: 2024



[employer]

External Reference #: [id number]

: 1940

Sex: Female



Demographics







                          Address                   [address] [place] [address]  [address]

 

                          Home Phone                [phone]

 

                          Preferred Language        Unknown

 

                          Marital Status            D

 

                          B[employer] Affiliation     [id number]

 

                          Race                      [id number]

 

                          Ethnic Group              /Latin





Author







                          Author                    Auto Generated

 

                          Organization              [facility]

 

                          Address                   Unknown

 

                          Phone                     Unavailable







Care Team Providers







                    Care Team Member Name    Role                Phone

 

                    [employer], Login    CP                  Unavailable

 

                    Elisa Chaudhari     CP                  [phone]







Allergies, Adverse Reactions, Alerts







  



                     Substance           Reaction            Status

 

  



                     NKDA                ??                  Active

## 2019-03-18 NOTE — XMS REPORT
Encounter??CCD:??2011??to??09/15/2011

                             Created on: 2069



[employer]

External Reference #: [id number]

: 1940

Sex: Female



Demographics







                          Address                   [address] [place] [address]  [address]

 

                          Home Phone                [phone]

 

                          Preferred Language        Unknown

 

                          Marital Status            D

 

                          [employer] Affiliation     [id number]

 

                          Race                      [id number]

 

                          Ethnic Group              /Latin





Author







                          Author                    Auto Generated

 

                          Organization              [facility]

 

                          Address                   Unknown

 

                          Phone                     Unavailable







Care Team Providers







                    Care Team Member Name    Role                Phone

 

                    Janette Garcia     CP                  [phone]

 

                    Rhea Chester EVAN    CP                  [phone]

 

                    Nadia Aguiar    CP                  [phone]

 

                    Meredith White    CP                  Unavailable

 

                    Codie See    CP                  [phone]

 

                    [employer], Login    CP                  Unavailable

 

                    Lincoln Abad    RP                  [phone]

 

                    Jovan Garza    CP                  [phone]

 

                    Callie Dasilva      CP                  Unavailable

 

                    Mega Hammer      CP                  Unavailable

 

                    [facility]         CP                  Unavailable

 

                    Elisa Chaudhari     CP                  [phone]

 

                    Jax Martel    CP                  [phone]







Allergies, Adverse Reactions, Alerts







  



                     Substance           Reaction            Status

 

  



                     NKDA                ??                  Active







Medications







    



              Medication     Instructions     Start Date     End Date     Status

 

    



                 acetaminophen-hydroc     1 tab, Route: PO, Drug Form: TAB,     09/15/2011      09/15/2011

                                         Completed



                           odone 500 mg-5 mg         ONCE, STAT, Start date: 09/15/11   



                           oral tablet               0:11:00, Stop date: 09/15/11   



                                         0:11:00   

 

    



                 morphine Sulfate     2 mg, Route: IVP, ONCE, Priority:     2011 

                                         Completed



                                         STAT, Start date: 11   



                                         22:35:00, Stop date: 11   



                                         22:35:00   

 

    



                 Vicodin 5/500 oral     1 tab, PO, Q4-6H, PRN, 30 tab, for     09/15/2011      2011

                                         Ordered



                           tablet                    Pain, Substitution Allowed,   



                                         Maintenance   







Vital Signs







                Most recent to oldest [Reference Range]:    1               2               3

 

                          Height                    157.48 cm 

                          (2011 22:05:00) ??    157.48 cm 

                          (2011 20:17:00) ??    ??

 

                          Temperature Oral [96.4-99.1 DegF]    97.1 DegF 

                          (09/15/2011 01:20:00) ??    98.0 DegF 

                          (2011 22:05:00) ??    ??

 

                          Systolic Blood Pressure [ mmHg]    119 mmHg 

                          (09/15/2011 01:20:00) ??    161 mmHg 

*HI*

                          (2011 22:05:00) ??    157 mmHg 

*HI*

                                        (2011 20:17:00) ??

 

                          Diastolic Blood Pressure [60-90 mmHg]    62 mmHg 

                          (09/15/2011 01:20:00) ??    81 mmHg 

                          (2011 22:05:00) ??    89 mmHg 

                                        (2011 20:17:00) ??

 

                          Respiratory Rate [14-20 BRMIN]    18 BRMIN 

                          (09/15/2011 01:20:00) ??    16 BRMIN 

                          (2011 22:05:00) ??    18 BRMIN 

                                        (2011 20:17:00) ??

 

                          Peripheral Pulse Rate [ bpm]    71 bpm 

                          (09/15/2011 01:20:00) ??    72 bpm 

                          (2011 22:05:00) ??    74 bpm 

                                        (2011 20:17:00) ??

 

                          Weight                    54.545 kg 

                          (2011 22:05:00) ??    54.545 kg 

                          (2011 20:17:00) ??    ??

## 2019-03-18 NOTE — XMS REPORT
Summary of Care: 10/24/16 - 10/24/16

                             Created on: 2041



[employer]

External Reference #: [id number]

: 1940

Sex: Female



Demographics







                          Address                   [address] [place] [address]  [address]

 

                          Home Phone                [phone]

 

                          Preferred Language        English

 

                          Marital Status            

 

                          Pentecostalism Affiliation     Samaritan

 

                          Race                      Other

 

                          Ethnic Group              /Latin





Author







                          Author                    [facility]

 

                          Organization              [facility]

 

                          Address                   Unknown

 

                          Phone                     Unavailable







Encounter





HQ Encntr_alias(FIN) [id number] Date(s): 10/24/16 - 10/24/16

[facility] [address] [place] [address]-      [phone]
[phone]

Discharge Disposition: Home or Self Care

Attending Physician: Julian Barrois MD





Vital Signs





No data available for this section



Problem List





No data available for this section



Allergies, Adverse Reactions, Alerts







   



                 Substance       Reaction        Severity        Status

 

   



                           NKDA                      Active







Medications





No data available for this section



Results





No data available for this section



Immunizations





No data available for this section



Procedures





No data available for this section



Social History





No data available for this section



Assessment and Plan





No data available for this section

## 2019-03-18 NOTE — XMS REPORT
Encounter??CCD:??2011??to??2011

                             Created on: 2016



[employer]

External Reference #: [id number]

: 1940

Sex: Female



Demographics







                          Address                   [id number] [place] [address]  [address]-

 

                          Home Phone                [phone]

 

                          Preferred Language        Unknown

 

                          Marital Status            D

 

                          Baptism Affiliation     [id number]

 

                          Race                      [id number]

 

                          Ethnic Group              /Latin





Author







                          Author                    Auto Generated

 

                          Organization              [facility]

 

                          Address                   Unknown

 

                          Phone                     Unavailable







Care Team Providers







                    Care Team Member Name    Role                Phone

 

                    Apple Mendiola     CP                  Unavailable

 

                    Bernadette Sullivan       CP                  Unavailable

 

                    Sherlyn Magallanes       CP                  [phone]

 

                    Aidan Nassar    CP                  Unavailable

 

                    Tess Luna    CP                  Unavailable

 

                    Peyton Carpio    CP                  [phone]

 

                    Aisha Gustafson    CP                  Unavailable

 

                    [employer], Login    CP                  Unavailable

 

                    Lincoln Abad    CP                  [phone]

 

                    Cris Noland     CP                  [phone]

 

                    Kyle Abraham         CP                  Unavailable

 

                    Behrens, Crystal    CP                  Unavailable

 

                    Court Kaur     CP                  Unavailable

 

                    SYSTEM, SYSTEM      CP                  Unavailable

 

                    Isidro Dempsey      CP                  Unavailable

 

                    Joseph Valentine       CP                  [phone]

 

                    Julian Barrios    CP                  [phone]

 

                    Aniya Walters    CP                  [phone]

 

                    Elisa Chaudhari     CP                  [phone]







Allergies, Adverse Reactions, Alerts







  



                     Substance           Reaction            Status

 

  



                     NKDA                ??                  Active







Medications







    



              Medication     Instructions     Start Date     End Date     Status

 

    



                 ondansetron 4 mg     1 tab, Route: PO, ONCE, Start date:     2011

                                         Completed



                           oral tablet,              11 17:25:00, Stop date:   



                           disintegrating            11 17:25:00   

 

    



                 morphine Sulfate     4 mg, Route: IM, ONCE, Start date:     2011

                                         Completed



                                         11 17:25:00, Stop date:   



                                         11 17:25:00   

 

    



              Unknown Home     Substitution Allowed     2011     ??           Ordered



                                         Medication    







Vital Signs







                Most recent to oldest [Reference Range]:    1               2               3

 

                          Height                    157.48 cm 

                    (2011 11:22:00) ??    ??                  ??

 

                          Temperature Oral [96.4-99.1 DegF]    97.8 DegF 

                          (2011 13:47:00) ??    97.6 DegF 

                          (2011 11:22:00) ??    ??

 

                          Systolic Blood Pressure [ mmHg]    171 mmHg 

*HI*

                          (2011 20:52:00) ??    148 mmHg 

*HI*

                          (2011 13:47:00) ??    148 mmHg 

*HI*

                                        (2011 11:22:00) ??

 

                          Diastolic Blood Pressure [60-90 mmHg]    73 mmHg 

                          (2011 20:52:00) ??    75 mmHg 

                          (2011 13:47:00) ??    80 mmHg 

                                        (2011 11:22:00) ??

 

                          Respiratory Rate [14-20 BRMIN]    16 BRMIN 

                          (2011 20:52:00) ??    18 BRMIN 

                          (2011 13:47:00) ??    17 BRMIN 

                                        (2011 11:22:00) ??

 

                          Peripheral Pulse Rate [ bpm]    79 bpm 

                          (2011 20:52:00) ??    73 bpm 

                          (2011 13:47:00) ??    83 bpm 

                                        (2011 11:22:00) ??

 

                          Weight                    55.000 kg 

                    (2011 11:22:00) ??    ??                  ??







Results





URINALYSIS





                          Most recent to oldest [Reference Range]:    1

 

                          UA Turbidity [>Clear]     Slight 

*ABN*

                                        (2011 20:45:00) ??

 

                          UA Color [>Yellow]        Yellow 

*NA*

                                        (2011 20:45:00) ??

 

                          UA pH [5.0-8.0]           6.5 

                                        (2011 20:45:00) ??

 

                          UA Spec Grav [<<=1.030]    1.020 

                                        (2011 20:45:00) ??

 

                          UA Glucose [>Negative mg/dL]    Negative mg/dL 

*NA*

                                        (2011 20:45:00) ??

 

                          UA Blood [>Negative]      Negative 

                                        (2011 20:45:00) ??

 

                          UA Ketones [>Negative mg/dL]    Negative mg/dL 

*NA*

                                        (2011 20:45:00) ??

 

                          UA Protein [>Negative mg/dL]    Negative mg/dL 

                                        (2011 20:45:00) ??

 

                          UA Urobilinogen [0.1-1.0 mg/dL]    <=1.0 mg/dL 

*NA*

                                        (2011 20:45:00) ??

 

                          UA Bili [>Negative]       Negative 

*NA*

                                        (2011 20:45:00) ??

 

                          UA Leuk Est [>Negative]    Small 

*ABN*

                                        (2011 20:45:00) ??

 

                          UA Nitrite [>Negative]    Negative 

                                        (2011 20:45:00) ??

 

                          UA WBC [0-5 /HPF]         4 /HPF 

                                        (2011 20:45:00) ??

 

                          UA RBC [0-2 /HPF]         1 /HPF 

                                        (2011 20:45:00) ??

 

                          UA Bacteria [>None Seen /HPF]    Occasional /HPF 

*NA*

                                        (2011 20:45:00) ??

 

                          UA Sq Epi [>Few /LPF]     Many /LPF 

*ABN*

                                        (2011 20:45:00) ??

 

                          UA Mucus [>None Seen /LPF]    Few /LPF 

*NA*

                                        (2011 20:45:00) ??







CHEMISTRY





                          Most recent to oldest [Reference Range]:    1

 

                          Sodium Lvl [135-145 mEq/L]    140 mEq/L 

                                        (2011 20:45:00) ??

 

                          Potassium Lvl [3.5-5.1 mEq/L]    3.6 mEq/L 

                                        (2011 20:45:00) ??

 

                          Chloride Lvl [ mEq/L]    106 mEq/L 

                                        (2011 20:45:00) ??

 

                          CO2 [24-32 mEq/L]         25 mEq/L 

                                        (2011 20:45:00) ??

 

                          AGAP [10.0-20.0 mEq/L]    12.6 mEq/L 

                                        (2011 20:45:00) ??

 

                          Creatinine Lvl [0.5-1.4 mg/dL]    0.7 mg/dL 

                                        (2011 20:45:00) ??

 

                          BUN [7-22 mg/dL]          9 mg/dL 

                                        (2011 20:45:00) ??

 

                          B/C Ratio [6-25]          13 

                                        (2011 20:45:00) ??

 

                          Glucose Lvl               124 mg/dL 1

*NA*

                                        (2011 20:45:00) ??

 

                          Total Protein [6.4-8.4 g/dL]    7.0 g/dL 

                                        (2011 20:45:00) ??

 

                          Albumin Lvl [3.5-5.0 g/dL]    3.0 g/dL 

*LOW*

                                        (2011 20:45:00) ??

 

                          Globulin [2.0-4.0 g/dL]    4.0 g/dL 

                                        (2011 20:45:00) ??

 

                          A/G Ratio [0.7-1.6]       0.8 

                                        (2011 20:45:00) ??

 

                          Calcium Lvl [8.5-10.5 mg/dL]    8.2 mg/dL 

*LOW*

                                        (2011 20:45:00) ??

 

                          ALT [0-65 U/L]            14 U/L 

                                        (2011 20:45:00) ??

 

                          AST [0-37 U/L]            12 U/L 

                                        (2011 20:45:00) ??

 

                          Alk Phos [ U/L]     55 U/L 

                                        (2011 20:45:00) ??

 

                          Bili Total [0.2-1.3 mg/dL]    0.3 mg/dL 

                                        (2011 20:45:00) ??







1Interpretive Data: Reference Ranges :     0 - 7 days  : 41 - 90 mg/dL7 days - 
150 yrs : 70 - 99 mg/dL (fasting), based on the clinical recommendations of the 
American Diabetes Association.



HEMATOLOGY





                          Most recent to oldest [Reference Range]:    1

 

                          WBC [3.7-10.4 K/CMM]      7.3 K/CMM 

                                        (2011 20:45:00) ??

 

                          RBC [4.20-5.40 M/CMM]     3.54 M/CMM 

*LOW*

                                        (2011 20:45:00) ??

 

                          Hgb [12.0-16.0 g/dL]      9.3 g/dL 

*LOW*

                                        (2011 20:45:00) ??

 

                          Hct [36.0-48.0 %]         28.7 % 

*LOW*

                                        (2011 20:45:00) ??

 

                          MCV [81.0-99.0 fL]        81.2 fL 

                                        (2011 20:45:00) ??

 

                          MCH [27.0-31.0 pg]        26.3 pg 

*LOW*

                                        (2011 20:45:00) ??

 

                          MCHC [32.0-36.0 g/dL]     32.4 g/dL 

                                        (2011 20:45:00) ??

 

                          RDW [11.5-14.5 %]         18.8 % 

*HI*

                                        (2011 20:45:00) ??

 

                          Platelet [133-450 K/CMM]    373 K/CMM 

                                        (2011 20:45:00) ??

 

                          MPV [7.4-10.4 fL]         7.6 fL 

                                        (2011 20:45:00) ??

 

                          Segs [45.0-75.0 %]        77.9 % 

*HI*

                                        (2011 20:45:00) ??

 

                          Lymphocytes [20.0-40.0 %]    15.0 % 

*LOW*

                                        (2011 20:45:00) ??

 

                          Monocytes [2.0-12.0 %]    5.7 % 

                                        (2011 20:45:00) ??

 

                          Eosinophils [0.0-4.0 %]    0.7 % 

                                        (2011 20:45:00) ??

 

                          Basophils [0.0-1.0 %]     0.7 % 

                                        (2011 20:45:00) ??

 

                          Segs-Bands # [1.5-8.1 K/CMM]    5.7 K/CMM 

                                        (2011 20:45:00) ??

 

                          Lymphocytes # [1.0-5.5 K/CMM]    1.1 K/CMM 

                                        (2011 20:45:00) ??

 

                          Monocytes # [0.0-0.8 K/CMM]    0.4 K/CMM 

                                        (2011 20:45:00) ??

 

                          Eosinophils # [0.0-0.5 K/CMM]    0.0 K/CMM 

                                        (2011 20:45:00) ??

 

                          Basophils # [0.0-0.2 K/CMM]    0.0 K/CMM 

                                        (2011 20:45:00) ??

 

                          PT [12.0-14.7 seconds]    13.6 seconds 

                                        (2011 20:45:00) ??

 

                          INR [0.85-1.17]           1.04 2

                                        (2011 20:45:00) ??

 

                          PTT [22.9-35.8 seconds]    27.5 seconds 3

                                        (2011 20:45:00) ??







2Interpretive Data: RECOMMENDED RANGES FOR PROTIME INR:   2.0-3.0 for most 
medical and surgical thromboembolic states.   2.5-3.5 for artificial heart 
valves and recurrent embolism.INR SHOULD BE USED ONLY FOR PATIENTS ON STABLE 
ANTICOAGULANT THERAPY.



3Interpretive Data: Heparin Therapeutic Range:  57 - 92 Seconds

## 2019-03-18 NOTE — XMS REPORT
Continuity of Care Document

                             Created on: 2018



PAINTING, MORENO

External Reference #: [id number]

: 1940

Sex: Female



Demographics







                          Address                   [address] [place] [address]  [id number]

 

                          Home Phone                [phone]

 

                          Preferred Language        Unknown

 

                          Marital Status            Unknown

 

                          Sikhism Affiliation     Unknown

 

                          Race                      Unknown

 

                          Ethnic Group              Unknown





Author







                          Author                    [facility]              Interface

 

                          Address                   Unknown

 

                          Phone                     Unavailable



                                                    



Problems

                    





                    Problem                            Status                            Onset Date     

                          Classification                            Date Reported       

                          Comments                            Source                    

 

                          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          

                    2018                                                           

                                                       [facility] [place]                

    

 

                          M25.561 - PAIN IN RIGHT KNEE                            Active                    

                    10/20/2016                                                                     

                                                       [facility] [place]                    

 

                    BENIGN THYROID CYST                            Active                            2012

                                                                                       

                                         [place]                    

 

                    FALL                            Active                            2011        

                                                                                       

                                        [facility]                    

 

                          CEREBRAL CONTUSION, BURTON ZYGOMATIC ARCH FX                            Active       

                     2011                                                        

                                                        [facility]      

              

 

                    CYST OF THYROID                            Active                                   

                                                                                       

                                        [facility]                    



                                                                                
                                                                                
      



Medications

                    





                    Medication                            Details                            Route      

                          Status                            Patient Instructions         

                          Ordering Provider                            Order Date           

                                        Source                    

 

                          Vicodin 5/500 oral tablet                            1 tab, PO, Q4-6H, PRN, 30 tab,

 for Pain, Substitution Allowed, Maintenance                            PO         

                    Active                                                        [facility] 

                           09/15/2011                            [facility]

                    

 

                          acetaminophen-hydrocodone 500 mg-5 mg oral tablet                            1 tab,

 Route: PO, Drug Form: TAB, ONCE, STAT, Start date: 09/15/11 0:11:00, Stop date:
09/15/11 0:11:00                            PO                            No Longer Active

                                                        Kayla                      

                          09/15/2011                            [facility]                  

  

 

                          morphine Sulfate                            2 mg, Route: IVP, ONCE, Priority: STAT,

 Start date: 11 22:35:00, Stop date: 11 22:35:00                    
                    IVP                            No Longer Active                                 

                          Christopher                            09/15/2011                   

                                        [facility]                    

 

                          Unknown Home Medication                            Substitution Allowed           

                                                Active                                    

                                                2011                            [facility]                    

 

                          ondansetron 4 mg oral tablet, disintegrating                            1 tab, Route:

 PO, ONCE, Start date: 11 17:25:00, Stop date: 11 17:25:00          
                          PO                            No Longer Active                      

                                                Nuzhat                            2011           

                                        [facility]                    

 

                          morphine Sulfate                            4 mg, Route: IM, ONCE, Start date: 11

 17:25:00, Stop date: 11 17:25:00                            IM              

                    No Longer Active                                                        Nuzhat

                            2011                            [facility]   

                 



                                                                                
                                                                                
      



Allergies, Adverse Reactions, Alerts

                    





                    Substance                            Category                            Reaction   

                          Severity                            Reaction type           

                          Status                            Date Reported                     

                          Comments                            Source                    



                                                                



Immunizations

                    





                    Immunization                            Date Given                            Site  

                          Status                            Last Updated             

                          Comments                            Source                    



                                                                        



Results

                    





                    Order Name                            Results                            Value      

                          Reference Range                            Date                

                          Interpretation                            Comments                       

                                        Source                    

 

                          Bone Density DXA Dual Energy MA                            Bone Density DXA Dual Energy

 MA                         



BONE DENSITY ASSESSMENT: 2018



CLINICAL DATA: Post menopausal.  Osteoporosis.  Osteoporosis/Osteoporosis



RISK FACTORS: .  



FINDINGS: 

Bone density evaluation was performed 2018 on the right femur neck using a
Hologic unit. The BMD average for the exam is 0.591  g/cm2. The T-score is -2.30
and the Z-score is -0.20.  This matches the World Health Organization's criteria
for osteopenia and places the patient at a medium risk for fracture.  



An additional bone density evaluation was performed 2018 on the left femur
neck using a Hologic unit. The BMD average for the exam is 0.586  g/cm2. The T-
score is -2.40 and the Z-score is -0.20.  This matches the World Health 
Organization's criteria for osteopenia and places the patient at a medium risk 
for fracture.  



An additional bone density evaluation was performed 2018 on the right hip 
using a Hologic unit. The BMD average for the exam is 0.606  g/cm2. The T-score 
is -2.80 and the Z-score is -0.70.  This matches the World Health Organization's
criteria for osteoporosis and places the patient at a high risk for fracture.  



An additional bone density evaluation was performed 2018 on the left hip 
using a Hologic unit. The BMD average for the exam is 0.627  g/cm2. The T-score 
is -2.60 and the Z-score is -0.50.  This matches the World Health Organization's
criteria for osteoporosis and places the patient at a high risk for fracture.  



An additional bone density evaluation was performed 2018 on the AP L1-L4 
region of spine using a Hologic unit. Complete risk assessment of this region 
was not determined.  



An additional bone density evaluation was performed 2018 on the AP L1-L4 
region of spine using a Hologic unit. The BMD average for the exam is 0.764  
g/cm2. The T-score is -2.60.  This matches the World Health Organization's 
criteria for osteoporosis and places the patient at a high risk for fracture.  



IMPRESSION: OSTEOPOROSIS

Patient is at high risk for fracture.    This exam was interpreted at [id number] 
for RUPERTO Craft 15.  



Kenn Dupree M.D., cm/carline:2018 11:51:51  



Imaging Technologist(s): Alexandra CARVALHO)(ELIZABETH), [facility]

                                                          2018                 

                                                      -

                                        -





Read by:  Nate Pop MD

Dictated Date/time:  18 11:51

Electronically Signed by:  Nate Pop MD                      18 
11:51

FINAL REPORT

                                        [facility]                    

 

                          Knee 1-2 Views unilateral DX                            Knee 1-2 Views unilateral 

DX                                      Right Knee x-ray 2 views 



History: 76-year-old female with right knee pain.



Comparison: None.



Findings:

Bones are osteopenic.

Bones are aligned and there is no fracture or subluxation.

Joint spaces are narrowed at all 3 compartments suggesting cartilage loss. There
are small osteophytes at posterior tibial plateau margin and at superior 
patellar contour.

A small knee effusion is present.

The patella appears eccentric and mild displaced lateral.

No soft tissue calcifications seen.



Impression:



No acute knee abnormality.



Bones are osteopenic with narrowing of 4

Narrowing of 3 compartments of knee with small tibial plateau osteophytes 
concordant with moderate osteoarthritis.



 



                                                          10/24/2016                 

                                                      -

                                        -





Read by:  Vasquez Cash MD

Dictated Date/time:  10/24/16 14:23

Electronically Signed by:  Vasquez Cash              10/24/16 
14:29

FINAL REPORT

                                         [facility]                    

 

                          Bone Density-Dual Energy Absorptionmetry                            Bone Density-Dual

 Energy Absorptionmetry                             - Bone Density-Dual Energy Absorptionmetry



 BONE DENSITY EVALUATION: 2013



CLINICAL DATA: Post menopausal.  



RISK FACTORS: .  



FINDINGS: 

Bone density evaluation was performed 2013 on the AP L1-L4 region of spine
using Lunar Dual Energy X-Ray Absorptiometry. The BMD average for the exam is 
0.850  g/cm2. The T-score is -2.70 and the Z-score is -0.80.  These values 
indicate 90.0% for age-matched controls.  This matches the World Health 
Organization's criteria for osteoporosis and places the patient at a high risk 
for fracture.  



An additional bone density evaluation was performed 2013 on the right 
femur neck using Lunar Dual Energy X-Ray Absorptiometry. The BMD average for the
exam is 0.800  g/cm2. The T-score is -1.70 and the Z-score is 0.30.  These 
values indicate 106.0% for age-matched controls.  This matches the World Health 
Organization's criteria for osteopenia and places the patient at a medium risk 
for fracture.  



An additional bone density evaluation was performed 2013 on the right hip 
using Lunar Dual Energy X-Ray Absorptiometry. The BMD average for the exam is 
0.756  g/cm2. The T-score is -2.00 and the Z-score is -0.20.  These values 
indicate 97.0% for age-matched controls.  This matches the World Health 
Organization's criteria for osteopenia and places the patient at a medium risk 
for fracture.  



An additional bone density evaluation was performed 2013 on the left femur
neck using Lunar Dual Energy X-Ray Absorptiometry. The BMD average for the exam 
is 0.783  g/cm2. The T-score is -1.80 and the Z-score is 0.20.  These values 
indicate 103.0% for age-matched controls.  This matches the World Health 
Organization's criteria for osteopenia and places the patient at a medium risk 
for fracture.  



An additional bone density evaluation was performed 2013 on the left hip 
using Lunar Dual Energy X-Ray Absorptiometry. The BMD average for the exam is 
0.738  g/cm2. The T-score is -2.10 and the Z-score is -0.30.  These values 
indicate 95.0% for age-matched controls.  This matches the World Health 
Organization's criteria for osteopenia and places the patient at a medium risk 
for fracture.  



IMPRESSION: OSTEOPOROSIS

Patient is at high risk for fracture.  Patient consult w/primary care provider 
is recommended.  



Mae mccormack/carline:2013 15:36:20  



Imaging Technologist: Reed Escalona [facility]

                                                          2013                 

                                                      -

                                        -





Read by:  Mae Wells

Dictated Date/time:  13 15:36

Electronically Signed by:  Mae Wells MD         13 
15:36

FINAL REPORT

                                         [facility] [place]                    

 

                          Digital Mammo Screening [facility]                            Digital Mammo Screening 

[facility] [place]                                   - DIGITAL MAMMO SCREENING [facility]TON [place]

BILATERAL DIGITAL SCREENING MAMMOGRAM WITH CAD: 2013

CLINICAL: Routine.  



Current study was evaluated with a Computer Aided Detection (CAD) system.  

No prior exams were available for comparison.  

There are scattered fibroglandular elements in both breasts that could obscure a
lesion on mammography.  

No significant masses, calcifications, or other findings are seen in either 
breast.  



IMPRESSION: NEGATIVE

There is no mammographic evidence of malignancy.  A screening mammogram in one 
year is recommended. 



Dr. Brady Gray M.D.          

eoc/penrad:2013 17:10:22  



Imaging Technologist: Apple LUGO(BEBETO)(ELIZABETH), [facility]

letter sent: Normal exam  

Mammogram BI-RADS: 1 Negative

                                                          2013                 

                                                      -

                                        -





Read by:  Brady Gray

Dictated Date/time:  13 17:10

Electronically Signed by:  Brady Gray MD         13 
17:10

FINAL REPORT

                                        [facility]                    

 

                    CHEMISTRY                            BUN                            9.0 mg/dL       

                          7 - 22                            09/15/2011                   

                    Normal                                                        [facility]

                    

 

                    CHEMISTRY                            Creatinine Lvl                            0.7 mg/dL

                             0.5 - 1.4                            09/15/2011         

                    Normal                                                        [facility]                    

 

                    CHEMISTRY                            Chloride Lvl                            106.0 meq/L

                             95 - 109                            09/15/2011          

                    Normal                                                        [facility]                    

 

                    CHEMISTRY                            Sodium Lvl                            140.0 meq/L

                             135 - 145                            09/15/2011         

                    Normal                                                        [facility]                    

 

                    CHEMISTRY                            Potassium Lvl                            3.6 meq/L

                             3.5 - 5.1                            09/15/2011         

                    Normal                                                        [facility]                    

 

                    CHEMISTRY                            Alk Phos                            55.0 U/L   

                          39 - 136                            09/15/2011             

                    Normal                                                        [facility]                    

 

                    CHEMISTRY                            Bili Total                            0.3 mg/dL

                             0.2 - 1.3                            09/15/2011         

                    Normal                                                        [facility]                    

 

                    CHEMISTRY                            Glucose Lvl                            124.0 mg/dL

                                                         09/15/2011                  

                          NA                            1Interpretive Data: Reference Ranges :     0 

- 7 days  : 41 - 90 mg/dL7 days - 150 yrs : 70 - 99 mg/dL (fasting), based on 
the clinical recommendations of the American Diabetes Association.              
                                        [facility]                    

 

                    CHEMISTRY                            AST                            12.0 U/L        

                          0 - 37                            09/15/2011                    

                    Normal                                                        [facility] 

                   

 

                    CHEMISTRY                            CO2                            25.0 meq/L      

                          24 - 32                            09/15/2011                 

                    Normal                                                        [facility]

                    

 

                    CHEMISTRY                            Albumin Lvl                            3.0 g/dL

                             3.5 - 5.0                            09/15/2011         

                    LOW                                                        [facility]                    

 

                    CHEMISTRY                            ALT                            14.0 U/L        

                          0 - 65                            09/15/2011                    

                    Normal                                                        [facility] 

                   

 

                    CHEMISTRY                            Calcium Lvl                            8.2 mg/dL

                             8.5 - 10.5                            09/15/2011        

                    LOW                                                        [facility]                    

 

                    CHEMISTRY                            Total Protein                            7.0 g/dL

                             6.4 - 8.4                            09/15/2011         

                    Normal                                                        [facility]                    

 

                    CHEMISTRY                            Globulin                            4.0 g/dL   

                          2.0 - 4.0                            09/15/2011            

                    Normal                                                        [facility]                    

 

                    CHEMISTRY                            A/G Ratio                            0.8       

                          0.7 - 1.6                            09/15/2011               

                    Normal                                                        [facility]                    

 

                    CHEMISTRY                            B/C Ratio                            13.0      

                          6 - 25                            09/15/2011                 

                    Normal                                                        [facility]

                    

 

                    CHEMISTRY                            AGAP                            12.6 meq/L     

                          10.0 - 20.0                            09/15/2011            

                    Normal                                                        [facility]                    

 

                    HEMATOLOGY                            INR                            1.04           

                          0.85 - 1.17                            09/15/2011                 

                          Normal                            2Interpretive Data: RECOMMENDED RANGES FOR

 PROTIME INR:   2.0-3.0 for most medical and surgical thromboembolic states.   
2.5-3.5 for artificial heart valves and recurrent embolism.INR SHOULD BE USED 
ONLY FOR PATIENTS ON STABLE ANTICOAGULANT THERAPY.                            [facility]                    

 

                    HEMATOLOGY                            PT                            13.6 s          

                          12.0 - 14.7                            09/15/2011                 

                    [facility]

                    

 

                    HEMATOLOGY                            PTT                            27.5 s         

                          22.9 - 35.8                            09/15/2011                

                          Normal                            3Interpretive Data: Heparin Therapeutic

 Range:  57 - 92 Seconds                            [facility]               

     

 

                    HEMATOLOGY                            Hgb                            9.3 g/dL       

                          12.0 - 16.0                            09/15/2011              

                    [facility]

                    

 

                    HEMATOLOGY                            MCV                            81.2 fL        

                          81.0 - 99.0                            09/15/2011               

                    Normal                                                        [facility]                    

 

                    HEMATOLOGY                            Hct                            28.7 %         

                          36.0 - 48.0                            09/15/2011                

                    [facility]

                    

 

                    HEMATOLOGY                            MCHC                            32.4 g/dL     

                          32.0 - 36.0                            09/15/2011            

                    Normal                                                        [facility]                    

 

                    HEMATOLOGY                            MCH                            26.3 pg        

                          27.0 - 31.0                            09/15/2011               

                    [facility]

                    

 

                    HEMATOLOGY                            RDW                            18.8 %         

                          11.5 - 14.5                            09/15/2011                

                    HI                                                        [facility] 

                   

 

                    HEMATOLOGY                            MPV                            7.6 fL         

                          7.4 - 10.4                            09/15/2011                 

                    Normal                                                        [facility]

                    

 

                    HEMATOLOGY                            Platelet                            373.0 K/CMM

                             133 - 450                            09/15/2011         

                    Normal                                                        [facility]                    

 

                    HEMATOLOGY                            WBC                            7.3 K/CMM      

                          3.7 - 10.4                            09/15/2011              

                    Normal                                                        [facility]                    

 

                    HEMATOLOGY                            RBC                            3.54 M/CMM     

                          4.20 - 5.40                            09/15/2011            

                    LOW                                                        [facility]                    

 

                    HEMATOLOGY                            Basophils #                            0.0 K/CMM

                             0.0 - 0.2                            09/15/2011         

                    Normal                                                        [facility]                    

 

                    HEMATOLOGY                            Monocytes #                            0.4 K/CMM

                             0.0 - 0.8                            09/15/2011         

                    Normal                                                        [facility]                    

 

                    HEMATOLOGY                            Eosinophils #                            0.0 K/CMM

                             0.0 - 0.5                            09/15/2011         

                    Normal                                                        [facility]                    

 

                    HEMATOLOGY                            Segs                            77.9 %        

                          45.0 - 75.0                            09/15/2011               

                    HI                                                        [facility]

                    

 

                    HEMATOLOGY                            Basophils                            0.7 %    

                          0.0 - 1.0                            09/15/2011             

                    Normal                                                        [facility]                    

 

                    HEMATOLOGY                            Segs-Bands #                            5.7 K/CMM

                             1.5 - 8.1                            09/15/2011         

                    Normal                                                        [facility]                    

 

                    HEMATOLOGY                            Eosinophils                            0.7 %  

                           0.0 - 4.0                            09/15/2011           

                    Normal                                                        [facility]                    

 

                    HEMATOLOGY                            Lymphocytes #                            1.1 K/CMM

                             1.0 - 5.5                            09/15/2011         

                    Normal                                                        [facility]                    

 

                    HEMATOLOGY                            Monocytes                            5.7 %    

                          2.0 - 12.0                            09/15/2011            

                    Normal                                                        [facility]                    

 

                    HEMATOLOGY                            Lymphocytes                            15.0 % 

                            20.0 - 40.0                            09/15/2011        

                    LOW                                                        [facility]                    

 

                    URINALYSIS                            UA Urobilinogen                            <=1.0

 mg/dL <br/>*NA*<br/>(2011 20:45:00) <sup>??</sup>                        
                    0.1 - 1.0                            09/15/2011                            NA     

                                                      [facility]                  

  

 

                    URINALYSIS                            UA Leuk Est                            Small 

*ABN*

                          (2011 20:45:00) ??                              >Negative                   

                    09/15/2011                            ABN                                     

                                        [facility]                    

 

                    URINALYSIS                            UA Nitrite                            Negative

 

                          (2011 20:45:00) ??                              >Negative                   

                    09/15/2011                            Normal                                  

                                        [facility]                    

 

                    URINALYSIS                            UA RBC                            1.0 /HPF    

                          0 - 2                            09/15/2011                 

                    Normal                                                        [facility]

                    

 

                    URINALYSIS                            UA WBC                            4.0 /HPF    

                          0 - 5                            09/15/2011                 

                    Normal                                                        [facility]

                    

 

                    URINALYSIS                            UA Sq Epi                            Many /LPF

 

*ABN*

                          (2011 20:45:00) ??                              >Few                        

                    09/15/2011                            ABN                                          

                                        [facility]                    

 

                    URINALYSIS                            UA Bacteria                            Occasional

 /HPF 

*NA*

                          (2011 20:45:00) ??                              >None Seen                  

                    09/15/2011                            NA                                     

                                        [facility]                    

 

                    URINALYSIS                            UA Mucus                            Few /LPF 

*NA*

                          (2011 20:45:00) ??                              >None Seen                  

                    09/15/2011                            NA                                     

                                        [facility]                    

 

                    URINALYSIS                            UA Bili                            Negative 

*NA*

                          (2011 20:45:00) ??                              >Negative                   

                    09/15/2011                            NA                                      

                                        [facility]                    

 

                    URINALYSIS                            UA Blood                            Negative 

                          (2011 20:45:00) ??                              >Negative                   

                    09/15/2011                            Normal                                  

                                        [facility]                    

 

                    URINALYSIS                            UA Ketones                            Negative

 mg/dL 

*NA*

                          (2011 20:45:00) ??                              >Negative                   

                    09/15/2011                            NA                                      

                                        [facility]                    

 

                    URINALYSIS                            UA Turbidity                            Slight

 

*ABN*

                          (2011 20:45:00) ??                              >Clear                      

                    09/15/2011                            ABN                                        

                                        [facility]                    

 

                    URINALYSIS                            UA Spec Grav                            1.02  

                            <<=1.030                            09/15/2011           

                    Normal                                                        [facility]                    

 

                    URINALYSIS                            UA Color                            Yellow 

*NA*

                          (2011 20:45:00) ??                              >Yellow                     

                    09/15/2011                            NA                                        

                                        [facility]                    

 

                    URINALYSIS                            UA pH                            6.5          

                          5.0 - 8.0                            09/15/2011                  

                    Normal                                                        [facility]

                    

 

                    URINALYSIS                            UA Protein                            Negative

 mg/dL 

                          (2011 20:45:00) ??                              >Negative                   

                    09/15/2011                            Normal                                  

                                        [facility]                    

 

                    URINALYSIS                            UA Glucose                            Negative

 mg/dL 

*NA*

                          (2011 20:45:00) ??                              >Negative                   

                    09/15/2011                            [facility]                    



                                                                                
                                                                                
                                                                                
                                                                                
                                                                                
                                                                                
                                                                                
                                                                                
                                                                                
                                                                                
                                                                                
                                                                                
                                                                                
                   



Vital Signs

                    





                    Vital Sign                            Value                            Date         

                          Comments                            Source                    

 

                    Temperature Oral (F)                            97.1 F                            09/15/2011

                                                        [facility]      

              

 

                    Systolic (mm Hg)                            119.0                             09/15/2011

                                                        [facility]      

              

 

                    Diastolic (mm Hg)                            62.0                             09/15/2011

                                                        [facility]      

              

 

                    Peripheral Pulse Rate                            71.0                             09/15/2011

                                                        [facility]      

              

 

                    Respitory Rate                            18.0                             09/15/2011

                                                        [facility]      

              

 

                    Height                            157.48 cm                            09/15/2011   

                                                      [facility]         

           

 

                    Weight                            54.545                             09/15/2011     

                                                      [facility]           

         

 

                    Respitory Rate                            16.0                             09/15/2011

                                                        [facility]      

              

 

                    Peripheral Pulse Rate                            72.0                             09/15/2011

                                                        [facility]      

              

 

                    Temperature Oral (F)                            98.0 F                            09/15/2011

                                                        [facility]      

              

 

                    Systolic (mm Hg)                            161.0                             09/15/2011

                                                        [facility]      

              

 

                    Diastolic (mm Hg)                            81.0                             09/15/2011

                                                        [facility]      

              

 

                    Respitory Rate                            16.0                             09/15/2011

                                                        [facility]             

       

 

                    Peripheral Pulse Rate                            79.0                             09/15/2011

                                                        [facility]             

       

 

                    Diastolic (mm Hg)                            73.0                             09/15/2011

                                                        [facility]             

       

 

                    Systolic (mm Hg)                            171.0                             09/15/2011

                                                        [facility]             

       

 

                    Weight                            54.545                             09/15/2011     

                                                      [facility]           

         

 

                    Height                            157.48 cm                            09/15/2011   

                                                      [facility]         

           

 

                    Diastolic (mm Hg)                            89.0                             09/15/2011

                                                        [facility]      

              

 

                    Systolic (mm Hg)                            157.0                             09/15/2011

                                                        [facility]      

              

 

                    Respitory Rate                            18.0                             09/15/2011

                                                        [facility]      

              

 

                    Peripheral Pulse Rate                            74.0                             09/15/2011

                                                        [facility]      

              

 

                    Respitory Rate                            18.0                             2011

                                                        [facility]             

       

 

                    Temperature Oral (F)                            97.8 F                            2011

                                                        [facility]             

       

 

                    Peripheral Pulse Rate                            73.0                             2011

                                                        [facility]             

       

 

                    Diastolic (mm Hg)                            75.0                             2011

                                                        [facility]             

       

 

                    Systolic (mm Hg)                            148.0                             2011

                                                        [facility]             

       

 

                    Weight                            55.0                             2011       

                                                      [facility]                    



 

                    Height                            157.48 cm                            2011   

                                                      [facility]                

    

 

                    Respitory Rate                            17.0                             2011

                                                        [facility]             

       

 

                    Peripheral Pulse Rate                            83.0                             2011

                                                        [facility]             

       

 

                    Temperature Oral (F)                            97.6 F                            2011

                                                        [facility]             

       

 

                    Diastolic (mm Hg)                            80.0                             2011

                                                        [facility]             

       

 

                    Systolic (mm Hg)                            148.0                             2011

                                                        [facility]             

       



                                                                                
                                                                                
                                                                                
                                                                                
                                                                                
                                                                                
                                                                                
                                                 



Encounters

                    





                    Location                            Location Details                            Encounter

 Type                            Encounter Number                            Reason For

 Visit                            Attending Provider                            ADM Date

                            DC Date                            Status                

                                        Source                    

 

                    [facility]                                                        Emergency   

                          [id number]                                                

                    KATIE MORENO                             2011                            Active                            [facility]                                                        Emergency

                            [id number]                                             

                          PALAK RUIZ                             2011                    

                    09/15/2011                            Active                            [facility]                    

 

                    Not Sent                                                        Outpatient          

                          [id number]                            BENIGN THYROID CYST        

                          NON PHYSICIAN                             10/01/2012             

                                                [place]                             [place]

                    

 

                          [facility] [place]                                                

                          Outpt Diag Services                            [phone]                  

                                                Julian Barrios                             10/24/2016

                            10/25/2016                                               

                                         [facility] [place]                    

 

                          [facility] [place]                                                

                          Outpt Diag Services                            [phone]                  

                                                Julian Barrios                             2018                                               

                                         [facility] [place]                    



                                                                                
                                                   



Procedures

                    





                    Procedure                            Code                            Date           

                          Perfomer                            Comments                        

                                        Source

## 2019-03-18 NOTE — DIAGNOSTIC IMAGING REPORT
EXAM: CHEST 2 VIEWS, PA and lateral

DATE: 3/18/2019 Time stamp on exam: 10:31 AM

INDICATION: Cough

COMPARISON: None



FINDINGS:

LINES/TUBES: None



LUNGS: No consolidations or edema. 



PLEURA: No effusions or pneumothorax.



HEART AND MEDIASTINUM: Normal size and contour. Calcification within the aorta.



BONES AND SOFT TISSUES: No acute findings. Large air-containing middle

mediastinal structure compatible with a hiatal hernia. 



IMPRESSION:

No acute thoracic abnormality.











Signed by: Dr. Brennan Karimi DO on 3/18/2019 11:59 AM

## 2019-03-18 NOTE — XMS REPORT
Encounter CCD: 2013 to 2013

                             Created on: 06/15/2016



[employer]

External Reference #: [id number]

: 1940

Sex: Female



Demographics







                          Address                   

[place] [address]-

 

                          Home Phone                [phone]

 

                          Preferred Language        Unknown

 

                          Marital Status            

 

                          Anabaptist Affiliation     Faith

 

                          Race                      Other

 

                          Ethnic Group              /Latin





Author







                          Author                    Auto Generated

 

                          Organization              [facility] [place]

 

                          Address                   Unknown

 

                          Phone                     Unavailable







Care Team Providers







                    Care Team Member Name    Role                Phone

 

                    Destinee Burger    CP                  [phone]







Allergies, Adverse Reactions, Alerts







  



                     Substance           Reaction            Status

 

  



                           NKDA                      Active

## 2019-07-17 ENCOUNTER — HOSPITAL ENCOUNTER (OUTPATIENT)
Dept: HOSPITAL 88 - ER | Age: 79
Setting detail: OBSERVATION
LOS: 2 days | Discharge: HOME | End: 2019-07-19
Attending: INTERNAL MEDICINE | Admitting: INTERNAL MEDICINE
Payer: MEDICARE

## 2019-07-17 VITALS — HEIGHT: 62 IN | WEIGHT: 125.12 LBS | BODY MASS INDEX: 23.02 KG/M2

## 2019-07-17 DIAGNOSIS — N30.01: Primary | ICD-10-CM

## 2019-07-17 DIAGNOSIS — Z91.040: ICD-10-CM

## 2019-07-17 DIAGNOSIS — E78.5: ICD-10-CM

## 2019-07-17 DIAGNOSIS — Z82.49: ICD-10-CM

## 2019-07-17 DIAGNOSIS — R51: ICD-10-CM

## 2019-07-17 DIAGNOSIS — K21.9: ICD-10-CM

## 2019-07-17 DIAGNOSIS — R10.84: ICD-10-CM

## 2019-07-17 DIAGNOSIS — K80.20: ICD-10-CM

## 2019-07-17 DIAGNOSIS — K52.9: ICD-10-CM

## 2019-07-17 DIAGNOSIS — Z87.440: ICD-10-CM

## 2019-07-17 DIAGNOSIS — R11.2: ICD-10-CM

## 2019-07-17 DIAGNOSIS — D64.9: ICD-10-CM

## 2019-07-17 DIAGNOSIS — Z88.8: ICD-10-CM

## 2019-07-17 DIAGNOSIS — M19.90: ICD-10-CM

## 2019-07-17 DIAGNOSIS — I10: ICD-10-CM

## 2019-07-17 DIAGNOSIS — I48.0: ICD-10-CM

## 2019-07-17 LAB
ALBUMIN SERPL-MCNC: 3.4 G/DL (ref 3.5–5)
ALBUMIN/GLOB SERPL: 1 {RATIO} (ref 0.8–2)
ALP SERPL-CCNC: 60 IU/L (ref 40–150)
ALT SERPL-CCNC: < 6 IU/L (ref 0–55)
AMYLASE SERPL-CCNC: 60 U/L (ref 25–125)
ANION GAP SERPL CALC-SCNC: 14.7 MMOL/L (ref 8–16)
BACTERIA URNS QL MICRO: (no result) /HPF
BASOPHILS # BLD AUTO: 0.1 10*3/UL (ref 0–0.1)
BASOPHILS NFR BLD AUTO: 0.6 % (ref 0–1)
BILIRUB UR QL: NEGATIVE
BUN SERPL-MCNC: 12 MG/DL (ref 7–26)
BUN/CREAT SERPL: 17 (ref 6–25)
CALCIUM SERPL-MCNC: 9.3 MG/DL (ref 8.4–10.2)
CHLORIDE SERPL-SCNC: 103 MMOL/L (ref 98–107)
CK MB SERPL-MCNC: 3.1 NG/ML (ref 0–5)
CK SERPL-CCNC: 50 IU/L (ref 29–168)
CLARITY UR: (no result)
CO2 SERPL-SCNC: 23 MMOL/L (ref 22–29)
COLOR UR: YELLOW
DEPRECATED NEUTROPHILS # BLD AUTO: 12.5 10*3/UL (ref 2.1–6.9)
DEPRECATED RBC URNS MANUAL-ACNC: (no result) /HPF (ref 0–5)
EGFRCR SERPLBLD CKD-EPI 2021: > 60 ML/MIN (ref 60–?)
EOSINOPHIL # BLD AUTO: 0 10*3/UL (ref 0–0.4)
EOSINOPHIL NFR BLD AUTO: 0.2 % (ref 0–6)
EPI CELLS URNS QL MICRO: (no result) /LPF
ERYTHROCYTE [DISTWIDTH] IN CORD BLOOD: 16.9 % (ref 11.7–14.4)
GLOBULIN PLAS-MCNC: 3.4 G/DL (ref 2.3–3.5)
GLUCOSE SERPLBLD-MCNC: 178 MG/DL (ref 74–118)
HCT VFR BLD AUTO: 35 % (ref 34.2–44.1)
HGB BLD-MCNC: 11.3 G/DL (ref 12–16)
KETONES UR QL STRIP.AUTO: NEGATIVE
LEUKOCYTE ESTERASE UR QL STRIP.AUTO: NEGATIVE
LIPASE SERPL-CCNC: 36 U/L (ref 8–78)
LYMPHOCYTES # BLD: 0.7 10*3/UL (ref 1–3.2)
LYMPHOCYTES NFR BLD AUTO: 4.8 % (ref 18–39.1)
MCH RBC QN AUTO: 29 PG (ref 28–32)
MCHC RBC AUTO-ENTMCNC: 32.3 G/DL (ref 31–35)
MCV RBC AUTO: 90 FL (ref 81–99)
MONOCYTES # BLD AUTO: 0.7 10*3/UL (ref 0.2–0.8)
MONOCYTES NFR BLD AUTO: 5 % (ref 4.4–11.3)
NEUTS SEG NFR BLD AUTO: 88.6 % (ref 38.7–80)
NITRITE UR QL STRIP.AUTO: NEGATIVE
PLATELET # BLD AUTO: 380 X10E3/UL (ref 140–360)
POTASSIUM SERPL-SCNC: 3.7 MMOL/L (ref 3.5–5.1)
PROT UR QL STRIP.AUTO: NEGATIVE
RBC # BLD AUTO: 3.89 X10E6/UL (ref 3.6–5.1)
SODIUM SERPL-SCNC: 137 MMOL/L (ref 136–145)
SP GR UR STRIP: 1.02 (ref 1.01–1.02)
UROBILINOGEN UR STRIP-MCNC: 0.2 MG/DL (ref 0.2–1)

## 2019-07-17 PROCEDURE — 85025 COMPLETE CBC W/AUTO DIFF WBC: CPT

## 2019-07-17 PROCEDURE — 70450 CT HEAD/BRAIN W/O DYE: CPT

## 2019-07-17 PROCEDURE — 82553 CREATINE MB FRACTION: CPT

## 2019-07-17 PROCEDURE — 83690 ASSAY OF LIPASE: CPT

## 2019-07-17 PROCEDURE — 84484 ASSAY OF TROPONIN QUANT: CPT

## 2019-07-17 PROCEDURE — 82150 ASSAY OF AMYLASE: CPT

## 2019-07-17 PROCEDURE — 81001 URINALYSIS AUTO W/SCOPE: CPT

## 2019-07-17 PROCEDURE — 93005 ELECTROCARDIOGRAM TRACING: CPT

## 2019-07-17 PROCEDURE — 82550 ASSAY OF CK (CPK): CPT

## 2019-07-17 PROCEDURE — 99284 EMERGENCY DEPT VISIT MOD MDM: CPT

## 2019-07-17 PROCEDURE — 36415 COLL VENOUS BLD VENIPUNCTURE: CPT

## 2019-07-17 PROCEDURE — 74177 CT ABD & PELVIS W/CONTRAST: CPT

## 2019-07-17 PROCEDURE — 80053 COMPREHEN METABOLIC PANEL: CPT

## 2019-07-17 NOTE — XMS REPORT
Continuity of Care Document

                             Created on: 2019



[facility]

External Reference #: [id number]

: 1940

Sex: Female



Demographics







                          Address                   [address] [place] [address]  [address]

 

                          Home Phone                [phone]

 

                          Preferred Language        English

 

                          Marital Status            Unknown

 

                          Scientologist Affiliation     Unknown

 

                          Race                      Unknown

 

                          Ethnic Group              Unknown





Author







                          Author                    [employer]

 

                          Organization              [employer]

 

                          Address                   Unknown

 

                          Phone                     Unavailable







Care Team Providers







                    Care Team Member Name    Role                Phone

 

                    [employer]    Unavailable         Unavailable



                                    



Problems

                    





                    Problem                            Status                            Onset Date     

                          Classification                            Date Reported       

                          Comments                            Source                    

 

                          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          

                    2018                                                           

                                                       [facility]                

    

 

                          M25.561 - PAIN IN RIGHT KNEE                            Active                    

                    10/20/2016                                                                     

                                                       [facility]                    

 

                    BENIGN THYROID CYST                            Active                            2012

                                                                                       

                                         [facility]                    

 

                    FALL                            Active                            2011        

                                                                                       

                                        [facility]                    

 

                          CEREBRAL CONTUSION, BURTON ZYGOMATIC ARCH FX                            Active       

                     2011                                                        

                                                        [facility]      

              

 

                    CYST OF THYROID                            Active                                   

                                                                                       

                                        [facility]                    



                                                                                
                                                                       



Medications

                    





                    Medication                            Details                            Route      

                          Status                            Patient Instructions         

                          Ordering Provider                            Order Date           

                                        Source                    

 

                          Vicodin 5/500 oral tablet                            1 tab, PO, Q4-6H, PRN, 30 tab,

 for Pain, Substitution Allowed, Maintenance                            PO         

                    Active                                                        [facility] 

                           09/15/2011                            [facility]

                    

 

                          acetaminophen-hydrocodone 500 mg-5 mg oral tablet                            1 tab,

 Route: PO, Drug Form: TAB, ONCE, STAT, Start date: 09/15/11 0:11:00, Stop date:
09/15/11 0:11:00                            PO                            No Longer Active

                                                        Kayla                      

                          09/15/2011                            [facility]                  

  

 

                          morphine Sulfate                            2 mg, Route: IVP, ONCE, Priority: STAT,

 Start date: 11 22:35:00, Stop date: 11 22:35:00                    
                    IVP                            No Longer Active                                 

                          Christopher                            09/15/2011                   

                                        [facility]                    

 

                          Unknown Home Medication                            Substitution Allowed           

                                                Active                                    

                                                2011                            [facility]                    

 

                          ondansetron 4 mg oral tablet, disintegrating                            1 tab, Route:

 PO, ONCE, Start date: 11 17:25:00, Stop date: 11 17:25:00          
                          PO                            No Longer Active                      

                                                Nuzhat                            2011           

                                        [facility]                    

 

                          morphine Sulfate                            4 mg, Route: IM, ONCE, Start date: 11

 17:25:00, Stop date: 11 17:25:00                            IM              

                    No Longer Active                                                        Nuzhat

                            2011                            [facility]   

                 



                                                                                
                                                                                
  



Allergies, Adverse Reactions, Alerts

        





                                        No Known Medication Allergies                      



                                                        



Immunizations

        





                                        No Data Provided for This Section



                                    



Results







                    Order Name                            Results                            Value      

                          Reference Range                            Date                

                          Interpretation                            Comments                       

                                        Source                    

 

                CHEMISTRY                            BUN             9.0                            7 - 22     

                          09/15/2011                            Normal                  

                                                      [facility]                    

 

                CHEMISTRY                            Creatinine Lvl    0.7                            

0.5 - 1.4                            09/15/2011                            Normal    

                                                      [facility]                 

   

 

                CHEMISTRY                            Chloride Lvl    106.0                            

95 - 109                            09/15/2011                            Normal     

                                                      [facility]                  

  

 

                CHEMISTRY                            Sodium Lvl      140.0                            135

 - 145                            09/15/2011                            Normal       

                                                      [facility]                    



 

                CHEMISTRY                            Potassium Lvl    3.6                            3.5

 - 5.1                            09/15/2011                            Normal       

                                                      [facility]                    



 

                CHEMISTRY                            Alk Phos        55.0                            39 - 

136                            09/15/2011                            Normal          

                                                      [facility]                    

 

                CHEMISTRY                            Bili Total      0.3                            0.2 

- 1.3                            09/15/2011                            Normal        

                                                      [facility]                    

 

                CHEMISTRY                            Glucose Lvl     124.0                             

                           09/15/2011                            NA                  

                                        <sup>1</sup>Interpretive Data: Reference Ranges :      0 - 7 days  : 41 -

 90 mg/dL 7 days - 150 yrs : 70 - 99 mg/dL (fasting), based on the clinical 
recommendations of the American Diabetes Association.                            [facility]                    

 

                CHEMISTRY                            AST             12.0                            0 - 37    

                          09/15/2011                            Normal                 

                                                      [facility]                    

 

                CHEMISTRY                            CO2             25.0                            24 - 32   

                          09/15/2011                            Normal                

                                                      [facility]                    

 

                CHEMISTRY                            Albumin Lvl     3.0                            3.5

 - 5.0                            09/15/2011                            LOW          

                                                      [facility]                    

 

                CHEMISTRY                            ALT             14.0                            0 - 65    

                          09/15/2011                            Normal                 

                                                      [facility]                    

 

                CHEMISTRY                            Calcium Lvl     8.2                            8.5

 - 10.5                            09/15/2011                            LOW         

                                                      [facility]                    

 

                CHEMISTRY                            Total Protein    7.0                            6.4

 - 8.4                            09/15/2011                            Normal       

                                                      [facility]                    



 

                CHEMISTRY                            Globulin        4.0                            2.0 - 

4.0                            09/15/2011                            Normal          

                                                      [facility]                    

 

                CHEMISTRY                            A/G Ratio       0.8                            0.7 -

 1.6                            09/15/2011                            Normal         

                                                      [facility]                    

 

                CHEMISTRY                            B/C Ratio       13.0                            6 - 

25                            09/15/2011                            Normal           

                                                      [facility]                    

 

                CHEMISTRY                            AGAP            12.6                            10.0 - 20.0

                            09/15/2011                            Normal             

                                                      [facility]                    

 

                HEMATOLOGY                            INR             1.04                            0.85 - 1.17

                            09/15/2011                            Normal             

                                        <sup>2</sup>Interpretive Data: RECOMMENDED RANGES FOR PROTIME INR:  

  2.0-3.0 for most medical and surgical thromboembolic states.    2.5-3.5 for 
artificial heart valves and recurrent embolism.  INR SHOULD BE USED ONLY FOR 
PATIENTS ON STABLE ANTICOAGULANT THERAPY.                            [facility]

                    

 

                HEMATOLOGY                            PT              13.6                            12.0 - 14.7

                            09/15/2011                            Normal             

                                                      [facility]                    

 

                HEMATOLOGY                            PTT             27.5                            22.9 - 35.8

                            09/15/2011                            Normal             

                                        <sup>3</sup>Interpretive Data: Heparin Therapeutic Range:  57 - 92 Seconds

                                        [facility]                    

 

                HEMATOLOGY                            Hgb             9.3                            12.0 - 16.0

                            09/15/2011                            [facility]                    

 

                HEMATOLOGY                            MCV             81.2                            81.0 - 99.0

                            09/15/2011                            Normal             

                                                      [facility]                    

 

                HEMATOLOGY                            Hct             28.7                            36.0 - 48.0

                            09/15/2011                            [facility]                    

 

                HEMATOLOGY                            MCHC            32.4                            32.0 - 36.0

                            09/15/2011                            Normal             

                                                      [facility]                    

 

                HEMATOLOGY                            MCH             26.3                            27.0 - 31.0

                            09/15/2011                            [facility]                    

 

                HEMATOLOGY                            RDW             18.8                            11.5 - 14.5

                            09/15/2011                            [facility]                    

 

                HEMATOLOGY                            MPV             7.6                            7.4 - 10.4

                            09/15/2011                            Normal             

                                                      [facility]                    

 

                HEMATOLOGY                            Platelet        373.0                            133

 - 450                            09/15/2011                            Normal       

                                                      [facility]                    



 

                HEMATOLOGY                            WBC             7.3                            3.7 - 10.4

                            09/15/2011                            Normal             

                                                      [facility]                    

 

                HEMATOLOGY                            RBC             3.54                            4.20 - 5.40

                            09/15/2011                            [facility]                    

 

                HEMATOLOGY                            Basophils #     0.0                            0.0

 - 0.2                            09/15/2011                            Normal       

                                                      [facility]                    



 

                HEMATOLOGY                            Monocytes #     0.4                            0.0

 - 0.8                            09/15/2011                            [facility]                    



 

                HEMATOLOGY                            Eosinophils #    0.0                            

0.0 - 0.5                            09/15/2011                            Normal    

                                                      [facility]                 

   

 

                HEMATOLOGY                            Segs            77.9                            45.0 - 75.0

                            09/15/2011                            [facility]                    

 

                HEMATOLOGY                            Basophils       0.7                            0.0 

- 1.0                            09/15/2011                            Normal        

                                                      [facility]                    

 

                HEMATOLOGY                            Segs-Bands #    5.7                            1.5

 - 8.1                            09/15/2011                            [facility]                    



 

                HEMATOLOGY                            Eosinophils     0.7                            0.0

 - 4.0                            09/15/2011                            [facility]                    



 

                HEMATOLOGY                            Lymphocytes #    1.1                            

1.0 - 5.5                            09/15/2011                            [facility]                 

   

 

                HEMATOLOGY                            Monocytes       5.7                            2.0 

- 12.0                            09/15/2011                            Normal       

                                                      [facility]                    



 

                HEMATOLOGY                            Lymphocytes     15.0                            20.0

 - 40.0                            09/15/2011                            LOW         

                                                      [facility]                    

 

                    URINALYSIS                            UA Urobilinogen     ??                         

                    0.1 - 1.0                            09/15/2011                            NA       

                                                      [facility]                    



 

                    URINALYSIS                            UA Leuk Est         Small 

*ABN*

                          (2011 20:45:00) ??                            >Negative                     

                    09/15/2011                            ABN                                       

                                        [facility]                    

 

                    URINALYSIS                            UA Nitrite          Negative 

                          (2011 20:45:00) ??                            >Negative                     

                    09/15/2011                            Normal                                    

                                        [facility]                    

 

                URINALYSIS                            UA RBC          1.0                            0 - 2  

                          09/15/2011                            Normal               

                                                      [facility]                    

 

                URINALYSIS                            UA WBC          4.0                            0 - 5  

                          09/15/2011                            Normal               

                                                      [facility]                    

 

                    URINALYSIS                            UA Sq Epi           Many /LPF 

*ABN*

                    (2011 20:45:00) ??                            >Few                            

09/15/2011                            ABN                                            

                                        [facility]                    

 

                    URINALYSIS                            UA Bacteria         Occasional /HPF 

*NA*

                          (2011 20:45:00) ??                            >None Seen                    

                    09/15/2011                            NA                                       

                                        [facility]                    

 

                    URINALYSIS                            UA Mucus            Few /LPF 

*NA*

                          (2011 20:45:00) ??                            >None Seen                    

                    09/15/2011                            [facility]                    

 

                    URINALYSIS                            UA Bili             Negative 

*NA*

                          (2011 20:45:00) ??                            >Negative                     

                    09/15/2011                            NA                                        

                                        [facility]                    

 

                    URINALYSIS                            UA Blood            Negative 

                          (2011 20:45:00) ??                            >Negative                     

                    09/15/2011                            Normal                                    

                                        [facility]                    

 

                    URINALYSIS                            UA Ketones          Negative mg/dL 

*NA*

                          (2011 20:45:00) ??                            >Negative                     

                    09/15/2011                            NA                                        

                                        [facility]                    

 

                    URINALYSIS                            UA Turbidity        Slight 

*ABN*

                          (2011 20:45:00) ??                            >Clear                        

                    09/15/2011                            ABN                                          

                                        [facility]                    

 

                URINALYSIS                            UA Spec Grav    1.02                            

<<=1.030                            09/15/2011                            Normal     

                                                      [facility]                  

  

 

                    URINALYSIS                            UA Color            Yellow 

*NA*

                          (2011 20:45:00) ??                            >Yellow                       

                    09/15/2011                            [facility]                    

 

                URINALYSIS                            UA pH           6.5                            5.0 - 8.0

                            09/15/2011                            Normal             

                                                      [facility]                    

 

                    URINALYSIS                            UA Protein          Negative mg/dL 

                          (2011 20:45:00) ??                            >Negative                     

                    09/15/2011                            Normal                                    

                                        [facility]                    

 

                    URINALYSIS                            UA Glucose          Negative mg/dL 

*NA*

                          (2011 20:45:00) ??                            >Negative                     

                    09/15/2011                            NA                                        

                                        [facility]                    



                                                                                
                                                                                
                                                                                
                                                                                
                                                                                
                                                                                
                                                



Pathology Reports







                                        No Data Provided for This Section                    



                                                



Diagnostic Reports

                    





                    Report                            Value                            Date             

                                        Source                    

 

                                        Bone Density DXA Dual Energy [place]                         

BONE DENSITY ASSESSMENT: 2018

CLINICAL DATA: Post menopausal.  Osteoporosis.  Osteoporosis/Osteoporosis

RISK FACTORS: .  

FINDINGS: 

Bone density evaluation was performed 2018 on the right femur neck using a
Hologic unit. The BMD average for the exam is 0.591  g/cm2. The T-score is -2.30
and the Z-score is -0.20.  This matches the World Health Organization's criteria
for osteopenia and places the patient at a medium risk for fracture.  

An additional bone density evaluation was performed 2018 on the left femur
neck using a Hologic unit. The BMD average for the exam is 0.586  g/cm2. The T-
score is -2.40 and the Z-score is -0.20.  This matches the World Health 
Organization's criteria for osteopenia and places the patient at a medium risk 
for fracture.  

An additional bone density evaluation was performed 2018 on the right hip 
using a Hologic unit. The BMD average for the exam is 0.606  g/cm2. The T-score 
is -2.80 and the Z-score is -0.70.  This matches the World Health Organization's
criteria for osteoporosis and places the patient at a high risk for fracture.  

An additional bone density evaluation was performed 2018 on the left hip 
using a Hologic unit. The BMD average for the exam is 0.627  g/cm2. The T-score 
is -2.60 and the Z-score is -0.50.  This matches the World Health Organization's
criteria for osteoporosis and places the patient at a high risk for fracture.  

An additional bone density evaluation was performed 2018 on the AP L1-L4 
region of spine using a Hologic unit. Complete risk assessment of this region 
was not determined.  

An additional bone density evaluation was performed 2018 on the AP L1-L4 
region of spine using a Hologic unit. The BMD average for the exam is 0.764  
g/cm2. The T-score is -2.60.  This matches the World Health Organization's 
criteria for osteoporosis and places the patient at a high risk for fracture.  

IMPRESSION: OSTEOPOROSIS

Patient is at high risk for fracture.    This exam was interpreted at [id number] 
for  RUPERTO Jimenez 15.  

Kenn Dupree M.D.  

cm/penrad:2018 11:51:51  

Imaging Technologist(s): Alexandra Guerrero RT(R)(M), [facility]

                            2018                             [facility]   

                 

 

                          Knee 1-2 Views unilateral DX                            Right Knee x-ray 2 views 

History: 76-year-old female with right knee pain.

Comparison: None.

Findings:

Bones are osteopenic.

Bones are aligned and there is no fracture or subluxation.

Joint spaces are narrowed at all 3 compartments suggesting cartilage loss. There
are small osteophytes at posterior tibial plateau margin and at superior 
patellar contour.

A small knee effusion is present.

The patella appears eccentric and mild displaced lateral.

No soft tissue calcifications seen.

Impression:

No acute knee abnormality.

Bones are osteopenic with narrowing of 4

Narrowing of 3 compartments of knee with small tibial plateau osteophytes 
concordant with moderate osteoarthritis.

 

                            10/24/2016                             [facility]   

                 

 

                          Bone Density-Dual Energy Absorptionmetry                            - Bone Density-

Dual Energy Absorptionmetry

 BONE DENSITY EVALUATION: 2013

CLINICAL DATA: Post menopausal.  

RISK FACTORS: .  

FINDINGS: 

Bone density evaluation was performed 2013 on the AP L1-L4 region of spine
using Lunar Dual Energy X-Ray Absorptiometry. The BMD average for the exam is 
0.850  g/cm2. The T-score is -2.70 and the Z-score is -0.80.  These values 
indicate 90.0% for age-matched controls.  This matches the World Health 
Organization's criteria for osteoporosis and places the patient at a high risk 
for fracture.  

An additional bone density evaluation was performed 2013 on the right 
femur neck using Lunar Dual Energy X-Ray Absorptiometry. The BMD average for the
exam is 0.800  g/cm2. The T-score is -1.70 and the Z-score is 0.30.  These 
values indicate 106.0% for age-matched controls.  This matches the World Health 
Organization's criteria for osteopenia and places the patient at a medium risk 
for fracture.  

An additional bone density evaluation was performed 2013 on the right hip 
using Lunar Dual Energy X-Ray Absorptiometry. The BMD average for the exam is 
0.756  g/cm2. The T-score is -2.00 and the Z-score is -0.20.  These values 
indicate 97.0% for age-matched controls.  This matches the World Health 
Organization's criteria for osteopenia and places the patient at a medium risk 
for fracture.  

An additional bone density evaluation was performed 2013 on the left femur
neck using Lunar Dual Energy X-Ray Absorptiometry. The BMD average for the exam 
is 0.783  g/cm2. The T-score is -1.80 and the Z-score is 0.20.  These values 
indicate 103.0% for age-matched controls.  This matches the World Health 
Organization's criteria for osteopenia and places the patient at a medium risk 
for fracture.  

An additional bone density evaluation was performed 2013 on the left hip 
using Lunar Dual Energy X-Ray Absorptiometry. The BMD average for the exam is 
0.738  g/cm2. The T-score is -2.10 and the Z-score is -0.30.  These values 
indicate 95.0% for age-matched controls.  This matches the World Health 
Organization's criteria for osteopenia and places the patient at a medium risk 
for fracture.  

IMPRESSION: OSTEOPOROSIS

Patient is at high risk for fracture.  Patient consult w/primary care provider 
is recommended.  

Mae Wells  

/penrad:2013 15:36:20  

Imaging Technologist: Reed Escalona [facility]

                            2013                            [facility]   

                 

 

                          Digital Mammo Screening [place] [facility]                            - DIGITAL MAMMO SCREENING

 [place]

BILATERAL DIGITAL SCREENING MAMMOGRAM WITH CAD: 2013

CLINICAL: Routine.  

Current study was evaluated with a Computer Aided Detection (CAD) system.  

No prior exams were available for comparison.  

There are scattered fibroglandular elements in both breasts that could obscure a
lesion on mammography.  

No significant masses, calcifications, or other findings are seen in either 
breast.  

IMPRESSION: NEGATIVE

There is no mammographic evidence of malignancy.  A screening mammogram in one 
year is recommended. 

Dr. Brady Gray M.D.          

eoc/penrad:2013 17:10:22  

Imaging Technologist: Apple Sawant RT(BEBETO)(M), [facility]

letter sent: Normal exam  

Mammogram BI-RADS: 1 Negative

                            2013                             [facility]   

                 



                                                                                
                                   



Consultation Notes

                    





                                        No Data Provided for This Section                    



                                                            



Discharge Summaries

                    





                                        No Data Provided for This Section                    



                                                            



History and Physicals

                    





                                        No Data Provided for This Section                    



                                                                



Vital Signs

                     





                    Vital Sign                            Value                            Date         

                          Comments                            Source                    

 

                    Temperature Oral (F)                            97.1 F                            09/15/2011

                                                        [facility]      

              

 

                    Systolic (mm Hg)                            119.0                             09/15/2011

                                                        [facility]      

              

 

                    Diastolic (mm Hg)                            62.0                             09/15/2011

                                                        [facility]      

              

 

                    Peripheral Pulse Rate                            71.0                             09/15/2011

                                                        [facility]      

              

 

                    Respitory Rate                            18.0                             09/15/2011

                                                        [facility]      

              

 

                    Height                            157.48 cm                            09/15/2011   

                                                      [facility]         

           

 

                    Weight                            54.545                             09/15/2011     

                                                      [facility]           

         

 

                    Respitory Rate                            16.0                             09/15/2011

                                                        [facility]      

              

 

                    Peripheral Pulse Rate                            72.0                             09/15/2011

                                                        [facility]      

              

 

                    Temperature Oral (F)                            98.0 F                            09/15/2011

                                                        [facility]      

              

 

                    Systolic (mm Hg)                            161.0                             09/15/2011

                                                        [facility]      

              

 

                    Diastolic (mm Hg)                            81.0                             09/15/2011

                                                        [facility]      

              

 

                    Respitory Rate                            16.0                             09/15/2011

                                                        [facility]             

       

 

                    Peripheral Pulse Rate                            79.0                             09/15/2011

                                                        [facility]             

       

 

                    Diastolic (mm Hg)                            73.0                             09/15/2011

                                                        [facility]             

       

 

                    Systolic (mm Hg)                            171.0                             09/15/2011

                                                        [facility]             

       

 

                    Weight                            54.545                             09/15/2011     

                                                      [facility]           

         

 

                    Height                            157.48 cm                            09/15/2011   

                                                      [facility]         

           

 

                    Diastolic (mm Hg)                            89.0                             09/15/2011

                                                        [facility]      

              

 

                    Systolic (mm Hg)                            157.0                             09/15/2011

                                                        [facility]      

              

 

                    Respitory Rate                            18.0                             09/15/2011

                                                        [facility]      

              

 

                    Peripheral Pulse Rate                            74.0                             09/15/2011

                                                        [facility]      

              

 

                    Respitory Rate                            18.0                             2011

                                                        [facility]             

       

 

                    Temperature Oral (F)                            97.8 F                            2011

                                                        [facility]             

       

 

                    Peripheral Pulse Rate                            73.0                             2011

                                                        [facility]             

       

 

                    Diastolic (mm Hg)                            75.0                             2011

                                                        [facility]             

       

 

                    Systolic (mm Hg)                            148.0                             2011

                                                        [facility]             

       

 

                    Weight                            55.0                             2011       

                                                      [facility]                    



 

                    Height                            157.48 cm                            2011   

                                                      [facility]                

    

 

                    Respitory Rate                            17.0                             2011

                                                        [facility]             

       

 

                    Peripheral Pulse Rate                            83.0                             2011

                                                        [facility]             

       

 

                    Temperature Oral (F)                            97.6 F                            2011

                                                        [facility]             

       

 

                    Diastolic (mm Hg)                            80.0                             2011

                                                        [facility]             

       

 

                    Systolic (mm Hg)                            148.0                             2011

                                                        [facility]             

       



                                                                                
                                                                                
                                                                                
                                                                                
                                                                                
                                                                                
                                                                                
                                                 



Encounters

                    





                    Location                            Location Details                            Encounter

 Type                            Encounter Number                            Reason For

 Visit                            Attending Provider                            ADM Date

                            DC Date                            Status                

                                        Source                    

 

                    [facility]                                                        Emergency   

                          [id number]                                                

                    KATIE TIFFANIE                             2011                            Discharged                            [facility]                                                        Emergency

                            [id number]                                             

                          PALAK RUIZ                             2011                    

                    09/15/2011                            Discharged                            [facility]                    

 

                    Not Sent                                                        Outpatient          

                          [id number]                            BENIGN THYROID CYST        

                          NON PHYSICIAN                             10/01/2012             

                                                [facility]                             [facility]

                    

 

                          [facility] [place]                                                

                          Outpt Di[facility] Services                            [id number]                  

                                                Julian Barrios                             10/24/2016

                            10/25/2016                                               

                                         [facility] [place]                    

 

                          [facility] [place]                                                

                          Outpt Diag Services                            [id number]                  

                                                Julian Barrios                             2018                                               

                                         [facility] [place]                    



                                                                                
                                       



Procedures

        





                                        No Data Provided for This Section



                                                    



Assessment and Plan

                    





                                        No Data Provided for This Section                    



                                     



Plan of Care







                                        No Data Provided for This Section                    



                                                                



Social History

                    





                    Social History                            Date                            Source    

                

 

                          No data available for this section                            2018          

                                         [facility] [place]                    



                                                                    



Family History

                    





                                        No Data Provided for This Section                    



                                                            



Advance Directives

                    





                                        No Data Provided for This Section                    



                                                            



Functional Status

                    





                                        No Data Provided for This Section

## 2019-07-18 VITALS — DIASTOLIC BLOOD PRESSURE: 61 MMHG | SYSTOLIC BLOOD PRESSURE: 135 MMHG

## 2019-07-18 VITALS — DIASTOLIC BLOOD PRESSURE: 58 MMHG | SYSTOLIC BLOOD PRESSURE: 110 MMHG

## 2019-07-18 VITALS — SYSTOLIC BLOOD PRESSURE: 110 MMHG | DIASTOLIC BLOOD PRESSURE: 58 MMHG

## 2019-07-18 VITALS — DIASTOLIC BLOOD PRESSURE: 65 MMHG | SYSTOLIC BLOOD PRESSURE: 144 MMHG

## 2019-07-18 VITALS — DIASTOLIC BLOOD PRESSURE: 59 MMHG | SYSTOLIC BLOOD PRESSURE: 125 MMHG

## 2019-07-18 VITALS — SYSTOLIC BLOOD PRESSURE: 144 MMHG | DIASTOLIC BLOOD PRESSURE: 65 MMHG

## 2019-07-18 VITALS — DIASTOLIC BLOOD PRESSURE: 57 MMHG | SYSTOLIC BLOOD PRESSURE: 114 MMHG

## 2019-07-18 VITALS — SYSTOLIC BLOOD PRESSURE: 131 MMHG | DIASTOLIC BLOOD PRESSURE: 67 MMHG

## 2019-07-18 RX ADMIN — CEFTRIAXONE SCH MLS/HR: 100 INJECTION, POWDER, FOR SOLUTION INTRAVENOUS at 01:33

## 2019-07-18 RX ADMIN — RIFAXIMIN SCH MG: 200 TABLET ORAL at 22:00

## 2019-07-18 RX ADMIN — SODIUM CHLORIDE SCH MLS/HR: 9 INJECTION, SOLUTION INTRAVENOUS at 12:36

## 2019-07-18 RX ADMIN — HYDRALAZINE HYDROCHLORIDE SCH MG: 25 TABLET ORAL at 16:51

## 2019-07-18 RX ADMIN — METRONIDAZOLE SCH MLS/HR: 500 INJECTION, SOLUTION INTRAVENOUS at 16:53

## 2019-07-18 RX ADMIN — CEFTRIAXONE SCH MLS/HR: 100 INJECTION, POWDER, FOR SOLUTION INTRAVENOUS at 00:56

## 2019-07-18 RX ADMIN — SODIUM CHLORIDE SCH MLS/HR: 9 INJECTION, SOLUTION INTRAVENOUS at 00:40

## 2019-07-18 RX ADMIN — HYDRALAZINE HYDROCHLORIDE SCH MG: 25 TABLET ORAL at 22:00

## 2019-07-18 RX ADMIN — Medication SCH MG: at 21:00

## 2019-07-18 RX ADMIN — SODIUM CHLORIDE SCH MLS/HR: 9 INJECTION, SOLUTION INTRAVENOUS at 08:31

## 2019-07-18 NOTE — DIAGNOSTIC IMAGING REPORT
EXAM:  CT of the abdomen and pelvis WITH contrast



HISTORY:  High blood pressure, abdominal pain, vomiting

COMPARISON:  CT of the pelvis July 14, 2018.



TECHNIQUE: 

The abdomen and pelvis were scanned utilizing a multidetector helical scanner. 

Coronal and sagittal reformats are provided.

            PROTOCOL:  Routine

            IV CONTRAST:  100 cc of Isovue-370.

            ORAL CONTRAST:  Water

            RADIATION DOSE: Total DLP: 209.34 mGy*cm

                      Estimated effective dose:  (DLP x 0.015 x size factor)

Dose modulation, iterative reconstruction, and/or weight based adjustment of

the mA/kV was utilized to reduce the radiation dose to as low as reasonably

achievable.

            COMPLICATIONS: None



FINDINGS:

LOWER THORAX: Interval resolution of the pleural effusions and adjacent

atelectasis.



LIVER/BILIARY:  No masses.  The common bile duct remains prominent, 1 cm in

diameter.



GALLBLADDER: Cholelithiasis.

SPLEEN: Unremarkable

PANCREAS: Unremarkable



ADRENALS: No nodules

KIDNEYS: Symmetric perfusion. No enhancing masses. No hydronephrosis.



GI TRACT: 

Stable moderate hiatal hernia containing the proximal stomach. 

Additional segmentals of small bowel wall thickening and hyperemia, for example

the right lower quadrant coronal image 19

Persistent distal/ileal small bowel wall segmental thickening and hyperemia,

seen over multiple priors. Multiple loops of fluid-filled more proximal small

bowel measuring up to 3.2 cm in diameter.



VESSELS: 

Diffuse scattered atherosclerotic vascular calcifications.



PERITONEUM/RETROPERITONEUM: Slightly increased free fluid within the pelvis. No

free air.

LYMPH NODES: Scattered prominent mesenteric lymph nodes, measuring up to 1 cm

in short axis, likely reactive.



REPRODUCTIVE ORGANS/BLADDER: Hysterectomy. The urinary bladder is predominantly

decompressed.



SOFT TISSUES: Unremarkable

BONES: Stable hemangioma and bone islands within the L5 vertebral body.



IMPRESSION:

1.  Findings remain compatible with multifocal enteritis, increased hyperemia,

bowel wall thickening and segmental narrowing since July 2018. Crohn's disease

remains a favored diagnosis.  Findings compatible with associated partial

functional distal small bowel obstruction. 

2.  Cholelithiasis with persistent prominence of the common bile duct, but no

CT findings to suggest acute cholecystitis.



Signed by: Dr. Adolfo Son D.O., M.M.M. on 7/18/2019 12:19 AM

## 2019-07-18 NOTE — NUR
PRIMARY CARE PHYSICIAN:  Dr. Barrios



CHIEF COMPLAINT:  Abdominal pain.



HISTORY OF PRESENT ILLNESS:  A 78-year-old woman with abdominal pain and 
diarrhea;  Hx of enteritis. 



PAST MEDICAL HISTORY:  E. coli urinary tract infection, severe hypokalemia, 

acute gastroenteritis, recurrent intestinal bleeding, severe sepsis, atrial 

fibrillation, diarrhea, normocytic anemia, non-ST-elevation MI, depression, 

C. difficile colitis, iron deficiency anemia, sepsis, hypomagnesemia, 

hypokalemia, acute enteritis.



PAST SURGICAL HISTORY:  None.



ALLERGIES:  PER ELECTRONIC MEDICAL RECORD.



FAMILY HISTORY/SOCIAL HISTORY:  Patient lives alone and is .  

History of alcohol but quit.  No illicits or cigarettes.



MEDICATIONS:  Per electronic medical record.



REVIEW OF SYSTEMS:  Denies any dizziness, chest pain, shortness of breath, 

fever, chills, sweats, nausea, vomiting, diarrhea, leg pain, or back pain.



PHYSICAL EXAMINATION 

VITAL SIGNS:  Have been reviewed. 

GENERAL:  A tired-appearing woman resting in bed. 

HEENT:  Anicteric.  Pupils respond to light.  No oral lesions.

CARDIOVASCULAR:  Normal S1 and S2.  

LUNGS:  Moderate breath sounds.  

ABDOMEN:  diffusely tender abdomen

EXTREMITIES:  No edema or calf tenderness.

NEUROLOGICAL:  Alert and oriented times 3.  Moving all extremities.

SKIN:  Dry.

PSYCHIATRIC:  Normal affect.



LABS:  Reviewed.



MEDICATIONS:  Reviewed.



ASSESSMENT:  A 78-year-old woman with:

UTI

Enteritis

Cholelithiasis- old

HTN

PAF



PLAN 

IV flagyl/levaquin; 

IV hydration

Resume steroids/mesalamine

F/u with GI outpt

scd

dipso: 



Josh Camara MD, PhD.

## 2019-07-18 NOTE — XMS REPORT
Continuity of Care Document

                             Created on: 2019



[place]

External Reference #: [id number]

: 1940

Sex: Female



Demographics







                          Address                   [address] [place] [address]  [address]

 

                          Home Phone                [phone]

 

                          Preferred Language        English

 

                          Marital Status            Unknown

 

                          Rastafarian Affiliation     Unknown

 

                          Race                      Unknown

 

                          Ethnic Group              Unknown





Author







                          Author                    [employer]

 

                          Organization              [employer]

 

                          Address                   Unknown

 

                          Phone                     Unavailable







Care Team Providers







                    Care Team Member Name    Role                Phone

 

                    [employer]    Unavailable         Unavailable



                                    



Problems

                    





                    Problem                            Status                            Onset Date     

                          Classification                            Date Reported       

                          Comments                            Source                    

 

                          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          

                    2018                                                           

                                                       [facility]                

    

 

                          M25.561 - PAIN IN RIGHT KNEE                            Active                    

                    10/20/2016                                                                     

                                                       [facility]                    

 

                    BENIGN THYROID CYST                            Active                            2012

                                                                                       

                                         [facility]                    

 

                    FALL                            Active                            2011        

                                                                                       

                                        [facility]                    

 

                          CEREBRAL CONTUSION, BURTON ZYGOMATIC ARCH FX                            Active       

                     2011                                                        

                                                        [facility]      

              

 

                    CYST OF THYROID                            Active                                   

                                                                                       

                                        [facility]                    



                                                                                
                                                                       



Medications

                    





                    Medication                            Details                            Route      

                          Status                            Patient Instructions         

                          Ordering Provider                            Order Date           

                                        Source                    

 

                          Vicodin 5/500 oral tablet                            1 tab, PO, Q4-6H, PRN, 30 tab,

 for Pain, Substitution Allowed, Maintenance                            PO         

                    Active                                                        [facility] 

                           09/15/2011                            [facility]

                    

 

                          acetaminophen-hydrocodone 500 mg-5 mg oral tablet                            1 tab,

 Route: PO, Drug Form: TAB, ONCE, STAT, Start date: 09/15/11 0:11:00, Stop date:
09/15/11 0:11:00                            PO                            No Longer Active

                                                        Kayla                      

                          09/15/2011                            [facility]                  

  

 

                          morphine Sulfate                            2 mg, Route: IVP, ONCE, Priority: STAT,

 Start date: 11 22:35:00, Stop date: 11 22:35:00                    
                    IVP                            No Longer Active                                 

                          Christopher                            09/15/2011                   

                                        [facility]                    

 

                          Unknown Home Medication                            Substitution Allowed           

                                                Active                                    

                                                2011                            [facility]                    

 

                          ondansetron 4 mg oral tablet, disintegrating                            1 tab, Route:

 PO, ONCE, Start date: 11 17:25:00, Stop date: 11 17:25:00          
                          PO                            No Longer Active                      

                                                Nuzhat                            2011           

                                        [facility]                    

 

                          morphine Sulfate                            4 mg, Route: IM, ONCE, Start date: 11

 17:25:00, Stop date: 11 17:25:00                            IM              

                    No Longer Active                                                        Nuzhat

                            2011                            [facility]   

                 



                                                                                
                                                                                
  



Allergies, Adverse Reactions, Alerts

        





                                        No Known Medication Allergies                      



                                                        



Immunizations

        





                                        No Data Provided for This Section



                                    



Results







                    Order Name                            Results                            Value      

                          Reference Range                            Date                

                          Interpretation                            Comments                       

                                        Source                    

 

                CHEMISTRY                            BUN             9.0                            7 - 22     

                          09/15/2011                            Normal                  

                                                      [facility]                    

 

                CHEMISTRY                            Creatinine Lvl    0.7                            

0.5 - 1.4                            09/15/2011                            Normal    

                                                      [facility]                 

   

 

                CHEMISTRY                            Chloride Lvl    106.0                            

95 - 109                            09/15/2011                            Normal     

                                                      [facility]                  

  

 

                CHEMISTRY                            Sodium Lvl      140.0                            135

 - 145                            09/15/2011                            Normal       

                                                      [facility]                    



 

                CHEMISTRY                            Potassium Lvl    3.6                            3.5

 - 5.1                            09/15/2011                            Normal       

                                                      [facility]                    



 

                CHEMISTRY                            Alk Phos        55.0                            39 - 

136                            09/15/2011                            Normal          

                                                      [facility]                    

 

                CHEMISTRY                            Bili Total      0.3                            0.2 

- 1.3                            09/15/2011                            Normal        

                                                      [facility]                    

 

                CHEMISTRY                            Glucose Lvl     124.0                             

                           09/15/2011                            NA                  

                                        <sup>1</sup>Interpretive Data: Reference Ranges :      0 - 7 days  : 41 -

 90 mg/dL 7 days - 150 yrs : 70 - 99 mg/dL (fasting), based on the clinical 
recommendations of the American Diabetes Association.                            [facility]                    

 

                CHEMISTRY                            AST             12.0                            0 - 37    

                          09/15/2011                            Normal                 

                                                      [facility]                    

 

                CHEMISTRY                            CO2             25.0                            24 - 32   

                          09/15/2011                            Normal                

                                                      [facility]                    

 

                CHEMISTRY                            Albumin Lvl     3.0                            3.5

 - 5.0                            09/15/2011                            LOW          

                                                      [facility]                    

 

                CHEMISTRY                            ALT             14.0                            0 - 65    

                          09/15/2011                            Normal                 

                                                      [facility]                    

 

                CHEMISTRY                            Calcium Lvl     8.2                            8.5

 - 10.5                            09/15/2011                            LOW         

                                                      [facility]                    

 

                CHEMISTRY                            Total Protein    7.0                            6.4

 - 8.4                            09/15/2011                            Normal       

                                                      [facility]                    



 

                CHEMISTRY                            Globulin        4.0                            2.0 - 

4.0                            09/15/2011                            Normal          

                                                      [facility]                    

 

                CHEMISTRY                            A/G Ratio       0.8                            0.7 -

 1.6                            09/15/2011                            Normal         

                                                      [facility]                    

 

                CHEMISTRY                            B/C Ratio       13.0                            6 - 

25                            09/15/2011                            Normal           

                                                      [facility]                    

 

                CHEMISTRY                            AGAP            12.6                            10.0 - 20.0

                            09/15/2011                            Normal             

                                                      [facility]                    

 

                HEMATOLOGY                            INR             1.04                            0.85 - 1.17

                            09/15/2011                            Normal             

                                        <sup>2</sup>Interpretive Data: RECOMMENDED RANGES FOR PROTIME INR:  

  2.0-3.0 for most medical and surgical thromboembolic states.    2.5-3.5 for 
artificial heart valves and recurrent embolism.  INR SHOULD BE USED ONLY FOR 
PATIENTS ON STABLE ANTICOAGULANT THERAPY.                            [facility]

                    

 

                HEMATOLOGY                            PT              13.6                            12.0 - 14.7

                            09/15/2011                            Normal             

                                                      [facility]                    

 

                HEMATOLOGY                            PTT             27.5                            22.9 - 35.8

                            09/15/2011                            Normal             

                                        <sup>3</sup>Interpretive Data: Heparin Therapeutic Range:  57 - 92 Seconds

                                        [facility]                    

 

                HEMATOLOGY                            Hgb             9.3                            12.0 - 16.0

                            09/15/2011                            [facility]                    

 

                HEMATOLOGY                            MCV             81.2                            81.0 - 99.0

                            09/15/2011                            Normal             

                                                      [facility]                    

 

                HEMATOLOGY                            Hct             28.7                            36.0 - 48.0

                            09/15/2011                            [facility]                    

 

                HEMATOLOGY                            MCHC            32.4                            32.0 - 36.0

                            09/15/2011                            Normal             

                                                      [facility]                    

 

                HEMATOLOGY                            MCH             26.3                            27.0 - 31.0

                            09/15/2011                            [facility]                    

 

                HEMATOLOGY                            RDW             18.8                            11.5 - 14.5

                            09/15/2011                            [facility]                    

 

                HEMATOLOGY                            MPV             7.6                            7.4 - 10.4

                            09/15/2011                            Normal             

                                                      [facility]                    

 

                HEMATOLOGY                            Platelet        373.0                            133

 - 450                            09/15/2011                            Normal       

                                                      [facility]                    



 

                HEMATOLOGY                            WBC             7.3                            3.7 - 10.4

                            09/15/2011                            Normal             

                                                      [facility]                    

 

                HEMATOLOGY                            RBC             3.54                            4.20 - 5.40

                            09/15/2011                            [facility]                    

 

                HEMATOLOGY                            Basophils #     0.0                            0.0

 - 0.2                            09/15/2011                            Normal       

                                                      [facility]                    



 

                HEMATOLOGY                            Monocytes #     0.4                            0.0

 - 0.8                            09/15/2011                            [facility]                    



 

                HEMATOLOGY                            Eosinophils #    0.0                            

0.0 - 0.5                            09/15/2011                            Normal    

                                                      [facility]                 

   

 

                HEMATOLOGY                            Segs            77.9                            45.0 - 75.0

                            09/15/2011                            [facility]                    

 

                HEMATOLOGY                            Basophils       0.7                            0.0 

- 1.0                            09/15/2011                            Normal        

                                                      [facility]                    

 

                HEMATOLOGY                            Segs-Bands #    5.7                            1.5

 - 8.1                            09/15/2011                            [facility]                    



 

                HEMATOLOGY                            Eosinophils     0.7                            0.0

 - 4.0                            09/15/2011                            [facility]                    



 

                HEMATOLOGY                            Lymphocytes #    1.1                            

1.0 - 5.5                            09/15/2011                            [facility]                 

   

 

                HEMATOLOGY                            Monocytes       5.7                            2.0 

- 12.0                            09/15/2011                            Normal       

                                                      [facility]                    



 

                HEMATOLOGY                            Lymphocytes     15.0                            20.0

 - 40.0                            09/15/2011                            LOW         

                                                      [facility]                    

 

                    URINALYSIS                            UA Urobilinogen     ??                         

                    0.1 - 1.0                            09/15/2011                            NA       

                                                      [facility]                    



 

                    URINALYSIS                            UA Leuk Est         Small 

*ABN*

                          (2011 20:45:00) ??                            >Negative                     

                    09/15/2011                            ABN                                       

                                        [facility]                    

 

                    URINALYSIS                            UA Nitrite          Negative 

                          (2011 20:45:00) ??                            >Negative                     

                    09/15/2011                            Normal                                    

                                        [facility]                    

 

                URINALYSIS                            UA RBC          1.0                            0 - 2  

                          09/15/2011                            Normal               

                                                      [facility]                    

 

                URINALYSIS                            UA WBC          4.0                            0 - 5  

                          09/15/2011                            Normal               

                                                      [facility]                    

 

                    URINALYSIS                            UA Sq Epi           Many /LPF 

*ABN*

                    (2011 20:45:00) ??                            >Few                            

09/15/2011                            ABN                                            

                                        [facility]                    

 

                    URINALYSIS                            UA Bacteria         Occasional /HPF 

*NA*

                          (2011 20:45:00) ??                            >None Seen                    

                    09/15/2011                            NA                                       

                                        [facility]                    

 

                    URINALYSIS                            UA Mucus            Few /LPF 

*NA*

                          (2011 20:45:00) ??                            >None Seen                    

                    09/15/2011                            [facility]                    

 

                    URINALYSIS                            UA Bili             Negative 

*NA*

                          (2011 20:45:00) ??                            >Negative                     

                    09/15/2011                            NA                                        

                                        [facility]                    

 

                    URINALYSIS                            UA Blood            Negative 

                          (2011 20:45:00) ??                            >Negative                     

                    09/15/2011                            Normal                                    

                                        [facility]                    

 

                    URINALYSIS                            UA Ketones          Negative mg/dL 

*NA*

                          (2011 20:45:00) ??                            >Negative                     

                    09/15/2011                            NA                                        

                                        [facility]                    

 

                    URINALYSIS                            UA Turbidity        Slight 

*ABN*

                          (2011 20:45:00) ??                            >Clear                        

                    09/15/2011                            ABN                                          

                                        [facility]                    

 

                URINALYSIS                            UA Spec Grav    1.02                            

<<=1.030                            09/15/2011                            Normal     

                                                      [facility]                  

  

 

                    URINALYSIS                            UA Color            Yellow 

*NA*

                          (2011 20:45:00) ??                            >Yellow                       

                    09/15/2011                            [facility]                    

 

                URINALYSIS                            UA pH           6.5                            5.0 - 8.0

                            09/15/2011                            Normal             

                                                      [facility]                    

 

                    URINALYSIS                            UA Protein          Negative mg/dL 

                          (2011 20:45:00) ??                            >Negative                     

                    09/15/2011                            Normal                                    

                                        [facility]                    

 

                    URINALYSIS                            UA Glucose          Negative mg/dL 

*NA*

                          (2011 20:45:00) ??                            >Negative                     

                    09/15/2011                            NA                                        

                                        [facility]                    



                                                                                
                                                                                
                                                                                
                                                                                
                                                                                
                                                                                
                                                



Pathology Reports







                                        No Data Provided for This Section                    



                                                



Diagnostic Reports

                    





                    Report                            Value                            Date             

                                        Source                    

 

                                        Bone Density DXA Dual Energy [place]                         

BONE DENSITY ASSESSMENT: 2018

CLINICAL DATA: Post menopausal.  Osteoporosis.  Osteoporosis/Osteoporosis

RISK FACTORS: .  

FINDINGS: 

Bone density evaluation was performed 2018 on the right femur neck using a
Hologic unit. The BMD average for the exam is 0.591  g/cm2. The T-score is -2.30
and the Z-score is -0.20.  This matches the World Health Organization's criteria
for osteopenia and places the patient at a medium risk for fracture.  

An additional bone density evaluation was performed 2018 on the left femur
neck using a Hologic unit. The BMD average for the exam is 0.586  g/cm2. The T-
score is -2.40 and the Z-score is -0.20.  This matches the World Health 
Organization's criteria for osteopenia and places the patient at a medium risk 
for fracture.  

An additional bone density evaluation was performed 2018 on the right hip 
using a Hologic unit. The BMD average for the exam is 0.606  g/cm2. The T-score 
is -2.80 and the Z-score is -0.70.  This matches the World Health Organization's
criteria for osteoporosis and places the patient at a high risk for fracture.  

An additional bone density evaluation was performed 2018 on the left hip 
using a Hologic unit. The BMD average for the exam is 0.627  g/cm2. The T-score 
is -2.60 and the Z-score is -0.50.  This matches the World Health Organization's
criteria for osteoporosis and places the patient at a high risk for fracture.  

An additional bone density evaluation was performed 2018 on the AP L1-L4 
region of spine using a Hologic unit. Complete risk assessment of this region 
was not determined.  

An additional bone density evaluation was performed 2018 on the AP L1-L4 
region of spine using a Hologic unit. The BMD average for the exam is 0.764  
g/cm2. The T-score is -2.60.  This matches the World Health Organization's 
criteria for osteoporosis and places the patient at a high risk for fracture.  

IMPRESSION: OSTEOPOROSIS

Patient is at high risk for fracture.    This exam was interpreted at [id number] 
for  RUPERTO Jimenez 15.  

Kenn Dupree M.D.  

cm/penrad:2018 11:51:51  

Imaging Technologist(s): Alexandra Guerrero RT(R)(M), [facility]

                            2018                             [facility]   

                 

 

                          Knee 1-2 Views unilateral DX                            Right Knee x-ray 2 views 

History: 76-year-old female with right knee pain.

Comparison: None.

Findings:

Bones are osteopenic.

Bones are aligned and there is no fracture or subluxation.

Joint spaces are narrowed at all 3 compartments suggesting cartilage loss. There
are small osteophytes at posterior tibial plateau margin and at superior 
patellar contour.

A small knee effusion is present.

The patella appears eccentric and mild displaced lateral.

No soft tissue calcifications seen.

Impression:

No acute knee abnormality.

Bones are osteopenic with narrowing of 4

Narrowing of 3 compartments of knee with small tibial plateau osteophytes 
concordant with moderate osteoarthritis.

 

                            10/24/2016                             [facility]   

                 

 

                          Bone Density-Dual Energy Absorptionmetry                            - Bone Density-

Dual Energy Absorptionmetry

 BONE DENSITY EVALUATION: 2013

CLINICAL DATA: Post menopausal.  

RISK FACTORS: .  

FINDINGS: 

Bone density evaluation was performed 2013 on the AP L1-L4 region of spine
using Lunar Dual Energy X-Ray Absorptiometry. The BMD average for the exam is 
0.850  g/cm2. The T-score is -2.70 and the Z-score is -0.80.  These values 
indicate 90.0% for age-matched controls.  This matches the World Health 
Organization's criteria for osteoporosis and places the patient at a high risk 
for fracture.  

An additional bone density evaluation was performed 2013 on the right 
femur neck using Lunar Dual Energy X-Ray Absorptiometry. The BMD average for the
exam is 0.800  g/cm2. The T-score is -1.70 and the Z-score is 0.30.  These 
values indicate 106.0% for age-matched controls.  This matches the World Health 
Organization's criteria for osteopenia and places the patient at a medium risk 
for fracture.  

An additional bone density evaluation was performed 2013 on the right hip 
using Lunar Dual Energy X-Ray Absorptiometry. The BMD average for the exam is 
0.756  g/cm2. The T-score is -2.00 and the Z-score is -0.20.  These values 
indicate 97.0% for age-matched controls.  This matches the World Health 
Organization's criteria for osteopenia and places the patient at a medium risk 
for fracture.  

An additional bone density evaluation was performed 2013 on the left femur
neck using Lunar Dual Energy X-Ray Absorptiometry. The BMD average for the exam 
is 0.783  g/cm2. The T-score is -1.80 and the Z-score is 0.20.  These values 
indicate 103.0% for age-matched controls.  This matches the World Health 
Organization's criteria for osteopenia and places the patient at a medium risk 
for fracture.  

An additional bone density evaluation was performed 2013 on the left hip 
using Lunar Dual Energy X-Ray Absorptiometry. The BMD average for the exam is 
0.738  g/cm2. The T-score is -2.10 and the Z-score is -0.30.  These values 
indicate 95.0% for age-matched controls.  This matches the World Health 
Organization's criteria for osteopenia and places the patient at a medium risk 
for fracture.  

IMPRESSION: OSTEOPOROSIS

Patient is at high risk for fracture.  Patient consult w/primary care provider 
is recommended.  

Mae Wells  

/penrad:2013 15:36:20  

Imaging Technologist: Reed Escalona [facility]

                            2013                            [facility]   

                 

 

                          Digital Mammo Screening [place] [facility]                            - DIGITAL MAMMO SCREENING

 [facility] [place]

BILATERAL DIGITAL SCREENING MAMMOGRAM WITH CAD: 2013

CLINICAL: Routine.  

Current study was evaluated with a Computer Aided Detection (CAD) system.  

No prior exams were available for comparison.  

There are scattered fibroglandular elements in both breasts that could obscure a
lesion on mammography.  

No significant masses, calcifications, or other findings are seen in either 
breast.  

IMPRESSION: NEGATIVE

There is no mammographic evidence of malignancy.  A screening mammogram in one 
year is recommended. 

Dr. Brady Gray M.D.          

eoc/penrad:2013 17:10:22  

Imaging Technologist: Apple Sawant RT(BEBETO)(M), [facility]

letter sent: Normal exam  

Mammogram BI-RADS: 1 Negative

                            2013                             [facility]   

                 



                                                                                
                                   



Consultation Notes

                    





                                        No Data Provided for This Section                    



                                                            



Discharge Summaries

                    





                                        No Data Provided for This Section                    



                                                            



History and Physicals

                    





                                        No Data Provided for This Section                    



                                                                



Vital Signs

                     





                    Vital Sign                            Value                            Date         

                          Comments                            Source                    

 

                    Temperature Oral (F)                            97.1 F                            09/15/2011

                                                        [facility]      

              

 

                    Systolic (mm Hg)                            119.0                             09/15/2011

                                                        [facility]      

              

 

                    Diastolic (mm Hg)                            62.0                             09/15/2011

                                                        [facility]      

              

 

                    Peripheral Pulse Rate                            71.0                             09/15/2011

                                                        [facility]      

              

 

                    Respitory Rate                            18.0                             09/15/2011

                                                        [facility]      

              

 

                    Height                            157.48 cm                            09/15/2011   

                                                      [facility]         

           

 

                    Weight                            54.545                             09/15/2011     

                                                      [facility]           

         

 

                    Respitory Rate                            16.0                             09/15/2011

                                                        [facility]      

              

 

                    Peripheral Pulse Rate                            72.0                             09/15/2011

                                                        [facility]      

              

 

                    Temperature Oral (F)                            98.0 F                            09/15/2011

                                                        [facility]      

              

 

                    Systolic (mm Hg)                            161.0                             09/15/2011

                                                        [facility]      

              

 

                    Diastolic (mm Hg)                            81.0                             09/15/2011

                                                        [facility]      

              

 

                    Respitory Rate                            16.0                             09/15/2011

                                                        [facility]             

       

 

                    Peripheral Pulse Rate                            79.0                             09/15/2011

                                                        [facility]             

       

 

                    Diastolic (mm Hg)                            73.0                             09/15/2011

                                                        [facility]             

       

 

                    Systolic (mm Hg)                            171.0                             09/15/2011

                                                        [facility]             

       

 

                    Weight                            54.545                             09/15/2011     

                                                      [facility]           

         

 

                    Height                            157.48 cm                            09/15/2011   

                                                      [facility]         

           

 

                    Diastolic (mm Hg)                            89.0                             09/15/2011

                                                        [facility]      

              

 

                    Systolic (mm Hg)                            157.0                             09/15/2011

                                                        [facility]      

              

 

                    Respitory Rate                            18.0                             09/15/2011

                                                        [facility]      

              

 

                    Peripheral Pulse Rate                            74.0                             09/15/2011

                                                        [facility]      

              

 

                    Respitory Rate                            18.0                             2011

                                                        [facility]             

       

 

                    Temperature Oral (F)                            97.8 F                            2011

                                                        [facility]             

       

 

                    Peripheral Pulse Rate                            73.0                             2011

                                                        [facility]             

       

 

                    Diastolic (mm Hg)                            75.0                             2011

                                                        [facility]             

       

 

                    Systolic (mm Hg)                            148.0                             2011

                                                        [facility]             

       

 

                    Weight                            55.0                             2011       

                                                      [facility]                    



 

                    Height                            157.48 cm                            2011   

                                                      [facility]                

    

 

                    Respitory Rate                            17.0                             2011

                                                        [facility]             

       

 

                    Peripheral Pulse Rate                            83.0                             2011

                                                        [facility]             

       

 

                    Temperature Oral (F)                            97.6 F                            2011

                                                        [facility]             

       

 

                    Diastolic (mm Hg)                            80.0                             2011

                                                        [facility]             

       

 

                    Systolic (mm Hg)                            148.0                             2011

                                                        [facility]             

       



                                                                                
                                                                                
                                                                                
                                                                                
                                                                                
                                                                                
                                                                                
                                                 



Encounters

                    





                    Location                            Location Details                            Encounter

 Type                            Encounter Number                            Reason For

 Visit                            Attending Provider                            ADM Date

                            DC Date                            Status                

                                        Source                    

 

                    [facility]                                                        Emergency   

                          [id number]                                                

                    KATIE TIFFANIE                             2011                            Discharged                            [facility]                                                        Emergency

                            [id number]                                             

                          PALAK RUIZ                             2011                    

                    09/15/2011                            Discharged                            [facility]                    

 

                    Not Sent                                                        Outpatient          

                          [id number]                            BENIGN THYROID CYST        

                          NON PHYSICIAN                             10/01/2012             

                                                [facility]                             [facility]

                    

 

                          [facility] [place]                                                

                          Outpt Diag Services                            [id number]                  

                                                Julian Barrios                             10/24/2016

                            10/25/2016                                               

                                         [facility] [place]                    

 

                          [facility] [place]                                                

                          Outpt Diag Services                            [id number]                  

                                                Julian Barrios                             2018                                               

                                         [facility] [place]                    



                                                                                
                                       



Procedures

        





                                        No Data Provided for This Section



                                                    



Assessment and Plan

                    





                                        No Data Provided for This Section                    



                                     



Plan of Care







                                        No Data Provided for This Section                    



                                                                



Social History

                    





                    Social History                            Date                            Source    

                

 

                          No data available for this section                            2018          

                                         [facility] [place]                    



                                                                    



Family History

                    





                                        No Data Provided for This Section                    



                                                            



Advance Directives

                    





                                        No Data Provided for This Section                    



                                                            



Functional Status

                    





                                        No Data Provided for This Section

## 2019-07-18 NOTE — NUR
Pt received from ER. Pt A&O and in no apparent distress. Pt on room air and no tele. All 
safety measures ensured, bed alarm on, and pt call bell near. Pt encouraged to use call bell 
for assistance.

## 2019-07-18 NOTE — DIAGNOSTIC IMAGING REPORT
EXAMINATION: Head CT without contrast.  





HISTORY:Headache, high blood pressure.



COMPARISON:CT brain from 3/31/2018.

TECHNIQUE: Multidetector axial images were obtained from the foramen magnum to

the vertex without contrast. The images were reconstructed using brain and bone

algorithms.  Thin section brain images were reformatted into coronal and

sagittal planes.

Dose modulation, iterative reconstruction, and/or weight based adjustment of

the mA/kV was utilized to reduce the radiation dose to as low as reasonably

achievable.



Intravenous contrast: None  



IMAGE QUALITY: Acceptable.



FINDINGS:

    Skull/scalp: No lytic or blastic. lesions.  No surgical changes.

Unchanged bilateral sphenoid wing osseous demineralization with lytic changes.

    Parenchyma: Nonspecific bilateral frontoparietal patchy white matter

hypodensity are likely related to small vessel ischemic changes.

No acute hemorrhage, mass or acute major vascular territorial infarct.

    Arteries: No density suggestive of thrombosis.   

    Dural sinuses: No abnormal density suggestive of thrombosis. 

    Ventricles: No hydrocephalus or displacement.

    Extra-axial spaces: No abnormal density.  

    Brain volume: Generalized age-related cerebral volume loss.

    Craniocervical junction: No mass, Chiari malformation, or basilar

invagination.

    Sella: No mass. 

    Paranasal/mastoid sinuses: Imaged portions unremarkable.





IMPRESSION:

No acute intracranial abnormality, particularly no acute hemorrhage, mass or

acute major vascular territorial infarct.



Mild to moderate supratentorial white matter microvascular ischemic changes.



Generalized age-related cerebral volume loss.



Signed by: Dr. Clarissa Owens M.D. on 7/17/2019 11:59 PM

## 2019-07-19 VITALS — DIASTOLIC BLOOD PRESSURE: 67 MMHG | SYSTOLIC BLOOD PRESSURE: 136 MMHG

## 2019-07-19 VITALS — DIASTOLIC BLOOD PRESSURE: 69 MMHG | SYSTOLIC BLOOD PRESSURE: 150 MMHG

## 2019-07-19 VITALS — DIASTOLIC BLOOD PRESSURE: 58 MMHG | SYSTOLIC BLOOD PRESSURE: 115 MMHG

## 2019-07-19 VITALS — SYSTOLIC BLOOD PRESSURE: 142 MMHG | DIASTOLIC BLOOD PRESSURE: 69 MMHG

## 2019-07-19 LAB
ALBUMIN SERPL-MCNC: 2.7 G/DL (ref 3.5–5)
ALBUMIN/GLOB SERPL: 1 {RATIO} (ref 0.8–2)
ALP SERPL-CCNC: 42 IU/L (ref 40–150)
ALT SERPL-CCNC: < 6 IU/L (ref 0–55)
ANION GAP SERPL CALC-SCNC: 9 MMOL/L (ref 8–16)
BASOPHILS # BLD AUTO: 0.1 10*3/UL (ref 0–0.1)
BASOPHILS NFR BLD AUTO: 1.3 % (ref 0–1)
BUN SERPL-MCNC: 5 MG/DL (ref 7–26)
BUN/CREAT SERPL: 9 (ref 6–25)
CALCIUM SERPL-MCNC: 7.8 MG/DL (ref 8.4–10.2)
CHLORIDE SERPL-SCNC: 112 MMOL/L (ref 98–107)
CO2 SERPL-SCNC: 24 MMOL/L (ref 22–29)
DEPRECATED NEUTROPHILS # BLD AUTO: 2.6 10*3/UL (ref 2.1–6.9)
EGFRCR SERPLBLD CKD-EPI 2021: > 60 ML/MIN (ref 60–?)
EOSINOPHIL # BLD AUTO: 0.1 10*3/UL (ref 0–0.4)
EOSINOPHIL NFR BLD AUTO: 1.9 % (ref 0–6)
ERYTHROCYTE [DISTWIDTH] IN CORD BLOOD: 17.3 % (ref 11.7–14.4)
GLOBULIN PLAS-MCNC: 2.6 G/DL (ref 2.3–3.5)
GLUCOSE SERPLBLD-MCNC: 90 MG/DL (ref 74–118)
HCT VFR BLD AUTO: 28.3 % (ref 34.2–44.1)
HGB BLD-MCNC: 8.9 G/DL (ref 12–16)
LYMPHOCYTES # BLD: 0.7 10*3/UL (ref 1–3.2)
LYMPHOCYTES NFR BLD AUTO: 17.3 % (ref 18–39.1)
MCH RBC QN AUTO: 28.9 PG (ref 28–32)
MCHC RBC AUTO-ENTMCNC: 31.4 G/DL (ref 31–35)
MCV RBC AUTO: 91.9 FL (ref 81–99)
MONOCYTES # BLD AUTO: 0.4 10*3/UL (ref 0.2–0.8)
MONOCYTES NFR BLD AUTO: 9.9 % (ref 4.4–11.3)
NEUTS SEG NFR BLD AUTO: 69.1 % (ref 38.7–80)
PLATELET # BLD AUTO: 267 X10E3/UL (ref 140–360)
POTASSIUM SERPL-SCNC: 4 MMOL/L (ref 3.5–5.1)
RBC # BLD AUTO: 3.08 X10E6/UL (ref 3.6–5.1)
SODIUM SERPL-SCNC: 141 MMOL/L (ref 136–145)

## 2019-07-19 RX ADMIN — FAMOTIDINE SCH MG: 20 TABLET, FILM COATED ORAL at 08:51

## 2019-07-19 RX ADMIN — SODIUM CHLORIDE SCH MLS/HR: 9 INJECTION, SOLUTION INTRAVENOUS at 16:00

## 2019-07-19 RX ADMIN — METRONIDAZOLE SCH MLS/HR: 500 INJECTION, SOLUTION INTRAVENOUS at 16:20

## 2019-07-19 RX ADMIN — FAMOTIDINE SCH MG: 20 TABLET, FILM COATED ORAL at 16:00

## 2019-07-19 RX ADMIN — HYDRALAZINE HYDROCHLORIDE SCH MG: 25 TABLET ORAL at 13:22

## 2019-07-19 RX ADMIN — SODIUM CHLORIDE SCH MLS/HR: 9 INJECTION, SOLUTION INTRAVENOUS at 08:51

## 2019-07-19 RX ADMIN — SODIUM CHLORIDE SCH MLS/HR: 9 INJECTION, SOLUTION INTRAVENOUS at 01:11

## 2019-07-19 RX ADMIN — Medication SCH MG: at 08:51

## 2019-07-19 RX ADMIN — HYDRALAZINE HYDROCHLORIDE SCH MG: 25 TABLET ORAL at 06:29

## 2019-07-19 RX ADMIN — RIFAXIMIN SCH MG: 200 TABLET ORAL at 08:51

## 2019-07-19 RX ADMIN — METRONIDAZOLE SCH MLS/HR: 500 INJECTION, SOLUTION INTRAVENOUS at 06:21

## 2019-07-19 RX ADMIN — CEFTRIAXONE SCH MLS/HR: 100 INJECTION, POWDER, FOR SOLUTION INTRAVENOUS at 01:11

## 2019-07-19 NOTE — NUR
Bedside report and walking rounds complete with day shift RN. Pt resting in bed and in no 
apparent distress.

## 2019-07-19 NOTE — NUR
IM- progress note

o/n no events



REVIEW OF SYSTEMS:  Denies any dizziness, chest pain, shortness of breath, 

fever, chills, sweats, nausea, vomiting, diarrhea, leg pain, or back pain.



PHYSICAL EXAMINATION 

VITAL SIGNS:  Have been reviewed. 

GENERAL:  A tired-appearing woman resting in bed. 

HEENT:  Anicteric.  Pupils respond to light.  No oral lesions.

CARDIOVASCULAR:  Normal S1 and S2.  

LUNGS:  Moderate breath sounds.  

ABDOMEN:  diffusely tender abdomen

EXTREMITIES:  No edema or calf tenderness.

NEUROLOGICAL:  Alert and oriented times 3.  Moving all extremities.

SKIN:  Dry.

PSYCHIATRIC:  Normal affect.



LABS:  Reviewed.



MEDICATIONS:  Reviewed.



ASSESSMENT:  A 78-year-old woman with:

UTI

Enteritis

Cholelithiasis- old

HTN

PAF



PLAN 

IV flagyl/levaquin; 

IV hydration

Resume steroids/mesalamine

F/u with GI outpt

scd

dipso: 



7/18 drop in counts; advance to soft GI diet.



Josh Camara MD, PhD.

## 2019-07-19 NOTE — NUR
Took out IV and cath attached. Patient given discharge orders and understood them. Patient 
collected belongs.

## 2019-07-19 NOTE — NUR
CALLED [facility] BOYD REGARDING PATIENT BEING DISCHARGED, DR. IZQUIERDO DID NOT ANSWER, WILL 
CALL BACK IF CALL IS NOT RETURNED.

## 2019-07-19 NOTE — NUR
Got report from previous nurse. patient is ready to be discharge. sitting in bed. call light 
within reach.

## 2019-07-19 NOTE — NUR
Bedside hand off report received from JAVON Shaffer. Pt is lying in bed, AAOx3 with no acute 
distress noted, denies having chest pain at this time, resp is even and unlabored. Call 
light and personal items are within reach, bed is locked and side rails up x2 for safety. 
Will continue to monitor.

## 2019-07-19 NOTE — NUR
Hand off report given to JAVON Moreno. Pt is AAOx3 in no acute distress noted, resp even and 
unlabored.

## 2020-08-29 ENCOUNTER — HOSPITAL ENCOUNTER (EMERGENCY)
Dept: HOSPITAL 88 - ER | Age: 80
Discharge: HOME | End: 2020-08-29
Payer: MEDICARE

## 2020-08-29 VITALS — BODY MASS INDEX: 23 KG/M2 | WEIGHT: 125 LBS | HEIGHT: 62 IN

## 2020-08-29 VITALS — DIASTOLIC BLOOD PRESSURE: 60 MMHG | SYSTOLIC BLOOD PRESSURE: 128 MMHG

## 2020-08-29 DIAGNOSIS — I10: ICD-10-CM

## 2020-08-29 DIAGNOSIS — R10.31: Primary | ICD-10-CM

## 2020-08-29 DIAGNOSIS — K21.9: ICD-10-CM

## 2020-08-29 DIAGNOSIS — R19.7: ICD-10-CM

## 2020-08-29 DIAGNOSIS — R11.2: ICD-10-CM

## 2020-08-29 DIAGNOSIS — D64.9: ICD-10-CM

## 2020-08-29 DIAGNOSIS — E78.5: ICD-10-CM

## 2020-08-29 LAB
ALBUMIN SERPL-MCNC: 2.7 G/DL (ref 3.5–5)
ALBUMIN/GLOB SERPL: 0.8 {RATIO} (ref 0.8–2)
ALP SERPL-CCNC: 64 IU/L (ref 40–150)
ALT SERPL-CCNC: 12 IU/L (ref 0–55)
ANION GAP SERPL CALC-SCNC: 17.8 MMOL/L (ref 8–16)
BACTERIA URNS QL MICRO: (no result) /HPF
BASOPHILS # BLD AUTO: 0.1 10*3/UL (ref 0–0.1)
BASOPHILS NFR BLD AUTO: 0.7 % (ref 0–1)
BILIRUB UR QL: (no result)
BUN SERPL-MCNC: 14 MG/DL (ref 7–26)
BUN/CREAT SERPL: 19 (ref 6–25)
CALCIUM SERPL-MCNC: 8.1 MG/DL (ref 8.4–10.2)
CHLORIDE SERPL-SCNC: 109 MMOL/L (ref 98–107)
CK MB SERPL-MCNC: 1.6 NG/ML (ref 0–5)
CK SERPL-CCNC: 53 IU/L (ref 29–168)
CLARITY UR: CLEAR
CO2 SERPL-SCNC: 19 MMOL/L (ref 22–29)
COLOR UR: YELLOW
DEPRECATED APTT PLAS QN: 26.3 SECONDS (ref 23.8–35.5)
DEPRECATED INR PLAS: 1
DEPRECATED NEUTROPHILS # BLD AUTO: 5.8 10*3/UL (ref 2.1–6.9)
DEPRECATED RBC URNS MANUAL-ACNC: (no result) /HPF (ref 0–5)
EGFRCR SERPLBLD CKD-EPI 2021: > 60 ML/MIN (ref 60–?)
EOSINOPHIL # BLD AUTO: 0.1 10*3/UL (ref 0–0.4)
EOSINOPHIL NFR BLD AUTO: 0.9 % (ref 0–6)
EPI CELLS URNS QL MICRO: (no result) /LPF
ERYTHROCYTE [DISTWIDTH] IN CORD BLOOD: 16 % (ref 11.7–14.4)
GLOBULIN PLAS-MCNC: 3.2 G/DL (ref 2.3–3.5)
GLUCOSE SERPLBLD-MCNC: 122 MG/DL (ref 74–118)
HCT VFR BLD AUTO: 33.2 % (ref 34.2–44.1)
HGB BLD-MCNC: 10.4 G/DL (ref 12–16)
KETONES UR QL STRIP.AUTO: (no result)
LEUKOCYTE ESTERASE UR QL STRIP.AUTO: NEGATIVE
LYMPHOCYTES # BLD: 1 10*3/UL (ref 1–3.2)
LYMPHOCYTES NFR BLD AUTO: 12.5 % (ref 18–39.1)
MAGNESIUM SERPL-MCNC: 1.4 MG/DL (ref 1.3–2.1)
MCH RBC QN AUTO: 27.7 PG (ref 28–32)
MCHC RBC AUTO-ENTMCNC: 31.3 G/DL (ref 31–35)
MCV RBC AUTO: 88.3 FL (ref 81–99)
MONOCYTES # BLD AUTO: 0.8 10*3/UL (ref 0.2–0.8)
MONOCYTES NFR BLD AUTO: 9.9 % (ref 4.4–11.3)
MUCOUS THREADS URNS QL MICRO: (no result)
NEUTS SEG NFR BLD AUTO: 75.5 % (ref 38.7–80)
NITRITE UR QL STRIP.AUTO: NEGATIVE
PLATELET # BLD AUTO: 334 X10E3/UL (ref 140–360)
POTASSIUM SERPL-SCNC: 3.8 MMOL/L (ref 3.5–5.1)
PROT UR QL STRIP.AUTO: (no result)
PROTHROMBIN TIME: 13.7 SECONDS (ref 11.9–14.5)
RBC # BLD AUTO: 3.76 X10E6/UL (ref 3.6–5.1)
SODIUM SERPL-SCNC: 142 MMOL/L (ref 136–145)
SP GR UR STRIP: >=1.03 (ref 1.01–1.02)
UROBILINOGEN UR STRIP-MCNC: 0.2 MG/DL (ref 0.2–1)
WBC #/AREA URNS HPF: (no result) /HPF (ref 0–5)

## 2020-08-29 PROCEDURE — 82550 ASSAY OF CK (CPK): CPT

## 2020-08-29 PROCEDURE — 36415 COLL VENOUS BLD VENIPUNCTURE: CPT

## 2020-08-29 PROCEDURE — 87086 URINE CULTURE/COLONY COUNT: CPT

## 2020-08-29 PROCEDURE — 83735 ASSAY OF MAGNESIUM: CPT

## 2020-08-29 PROCEDURE — 85025 COMPLETE CBC W/AUTO DIFF WBC: CPT

## 2020-08-29 PROCEDURE — 81001 URINALYSIS AUTO W/SCOPE: CPT

## 2020-08-29 PROCEDURE — 84484 ASSAY OF TROPONIN QUANT: CPT

## 2020-08-29 PROCEDURE — 85610 PROTHROMBIN TIME: CPT

## 2020-08-29 PROCEDURE — 87186 SC STD MICRODIL/AGAR DIL: CPT

## 2020-08-29 PROCEDURE — 85730 THROMBOPLASTIN TIME PARTIAL: CPT

## 2020-08-29 PROCEDURE — 80053 COMPREHEN METABOLIC PANEL: CPT

## 2020-08-29 PROCEDURE — 82553 CREATINE MB FRACTION: CPT

## 2020-08-29 PROCEDURE — 93005 ELECTROCARDIOGRAM TRACING: CPT

## 2020-08-29 PROCEDURE — 74177 CT ABD & PELVIS W/CONTRAST: CPT

## 2020-08-29 PROCEDURE — 99284 EMERGENCY DEPT VISIT MOD MDM: CPT

## 2020-08-29 NOTE — XMS REPORT
Continuity of Care Document

                             Created on: 2020



[employer]

External Reference #: [id number]

: 1940

Sex: Female



Demographics





                          Address                   [address] [place] [address]  [address]

 

                          Home Phone                [phone]

 

                          Preferred Language        English

 

                          Marital Status            Unknown

 

                          Latter-day Affiliation     Unknown

 

                          Race                      Unknown

 

                          Ethnic Group              Unknown





Author





                          Author                    [facility]

 [employer], MORENO

 

                          Organization              "[employer]"

 

                          Address                   Unknown

 

                          Phone                     Unavailable







Care Team Providers





                    Care Team Member Name Role                Phone

 

                    [employer] Unavailable         Un

available



                                    



Problems

                    



                    Problem                         Status                      

   Onset Date       

                          Classification                         Date Reported  

         

                          Comments                         Source               

     

 

                          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                

         Active           

                    2018                                                  

       

                                                     [facility]            

        

 

                          M25.561 - PAIN IN RIGHT KNEE                         A

ctive                     

                    10/20/2016                                                  

                 

                                                     [facility]            

        

 

                    BENIGN THYROID CYST                         Active          

               

2012                                                                      

 

                                                     [place]                

    

 

                    FALL                         Active                         

2011          

                                                                                

      

                                        [facility]                    

 

                          CEREBRAL CONTUSION, BURTON ZYGOMATIC ARCH FX             

            Active        

                    2011                                                  

    

                                                    [facility]     

       

       

 

                    CYST OF THYROID                         Active              

                    

                                                                                

     

                                        [facility]                    



                                                                                
                                                                       



Medications

                    



                    Medication                         Details                  

       Route        

                          Status                         Patient Instructions   

          

                          Ordering Provider                         Order Date  

               

                                        Source                    

 

                          Vicodin 5/500 oral tablet                         1 ta

b, PO, Q4-6H, PRN, 30 tab,

for Pain, Substitution Allowed, Maintenance                         PO          

                    Active                                                  [facility]

n     

                          09/15/2011                         [facility]

nter   

                

 

                          acetaminophen-hydrocodone 500 mg-5 mg oral tablet     

                    1 tab,

Route: PO, Drug Form: TAB, ONCE, STAT, Start date: 09/15/11 0:11:00, Stop date: 
09/15/11 0:11:00                         PO                         No Longer 

Active                                                   [facility]                

 

                          09/15/2011                         [facility]

nter               

    

 

                          morphine Sulfate                         2 mg, Route: 

IVP, ONCE, Priority: STAT,

Start date: 11 22:35:00, Stop date: 11 22:35:00                     
                    IVP                         No Longer Active                

                 

                          [facility]                         09/15/2011              

          

                                        [facility]                 

   

 

                          Unknown Home Medication                         Substi

tution Allowed            

                                             Active                             

        

                                             2011                         

[facility]                    

 

                          ondansetron 4 mg oral tablet, disintegrating          

               1 tab, 

Route: PO, ONCE, Start date: 11 17:25:00, Stop date: 11 17:25:00    
                          PO                         No Longer Active           

      

                                             [place]uz[place]                         2011         

                                        [facility]                    

 

                          morphine Sulfate                         4 mg, Route: 

IM, ONCE, Start date: 

09/14/11 17:25:00, Stop date: 11 17:25:00                         IM      

                          No Longer Active                                      

        

                    [place]uz[place]                         2011                     

    [facility]                    



                                                                                
                                                                                
  



Allergies, Adverse Reactions, Alerts

        



                                        No Known Medication Allergies           

           



                                                        



Immunizations

        



                                        No Data Provided for This Section



                                    



Results





                    Order Name                         Results                  

       Value        

                          Reference Range                         Date          

          

                    Interpretation                         Comments             

            

Source                    

 

                CHEMISTRY                         BUN             9.0           

              7 - 22        

                          09/15/2011                         Normal             

          

                                                    [facility]            

        

 

                CHEMISTRY                         Creatinine Lvl  0.7           

              0.5

- 1.4                         09/15/2011                         Normal         

                                                    [facility]            

        

 

                CHEMISTRY                         Chloride Lvl    106.0         

                95 

- 109                         09/15/2011                         Normal         

                                                    [facility]            

        

 

                CHEMISTRY                         Sodium Lvl      140.0         

                135 -

145                         09/15/2011                         Normal           

                                                    [facility]            

        

 

                CHEMISTRY                         Potassium Lvl   3.6           

              3.5 

- 5.1                         09/15/2011                         Normal         

                                                    [facility]            

        

 

                CHEMISTRY                         Alk Phos        55.0          

               39 - 136

                          09/15/2011                         Normal             

  

                                                    [facility]            

        

 

                CHEMISTRY                         Bili Total      0.3           

              0.2 - 

1.3                         09/15/2011                         Normal           

                                                    [facility]            

        

 

                CHEMISTRY                         Glucose Lvl     124.0         

                    

                          09/15/2011                         NA                 

      

                                        <sup>1</sup>Interpretive Data: Reference

 Ranges :      0 - 7 days  : 41 - 90 

mg/dL 7 days - 150 yrs : 70 - 99 mg/dL (fasting), based on the clinical 
recommendations of the American Diabetes Association.                         [facility]                    

 

                CHEMISTRY                         AST             12.0          

               0 - 37       

                          09/15/2011                         Normal             

         

                                                    [facility]            

        

 

                CHEMISTRY                         CO2             25.0          

               24 - 32      

                          09/15/2011                         Normal             

        

                                                    [facility]            

        

 

                CHEMISTRY                         Albumin Lvl     3.0           

              3.5 - 

5.0                         09/15/2011                         LOW              

                                                    [facility]            

        

 

                CHEMISTRY                         ALT             14.0          

               0 - 65       

                          09/15/2011                         Normal             

         

                                                    [facility]            

        

 

                CHEMISTRY                         Calcium Lvl     8.2           

              8.5 - 

10.5                         09/15/2011                         LOW             

                                                    [facility]            

        

 

                CHEMISTRY                         Total Protein   7.0           

              6.4 

- 8.4                         09/15/2011                         Normal         

                                                    [facility]            

        

 

                CHEMISTRY                         Globulin        4.0           

              2.0 - 4.0

                          09/15/2011                         Normal             

  

                                                    [facility]            

        

 

                CHEMISTRY                         A/G Ratio       0.8           

              0.7 - 

1.6                         09/15/2011                         Normal           

                                                    [facility]            

        

 

                CHEMISTRY                         B/C Ratio       13.0          

               6 - 25 

                          09/15/2011                         Normal             

   

                                                    [facility]            

        

 

                CHEMISTRY                         AGAP            12.6          

               10.0 - 20.0 

                          09/15/2011                         Normal             

   

                                                    [facility]            

        

 

                HEMATOLOGY                         INR             1.04         

                0.85 - 1.17 

                          09/15/2011                         Normal             

   

                                        <sup>2</sup>Interpretive Data: RECOMMEND

ED RANGES FOR PROTIME INR:    

2.0-3.0 for most medical and surgical thromboembolic states.    2.5-3.5 for 
artificial heart valves and recurrent embolism.  INR SHOULD BE USED ONLY FOR 
PATIENTS ON STABLE ANTICOAGULANT THERAPY.                         [facility]                    

 

                HEMATOLOGY                         PT              13.6         

                12.0 - 14.7  

                          09/15/2011                         Normal             

    

                                                    [facility]            

        

 

                HEMATOLOGY                         PTT             27.5         

                22.9 - 35.8 

                          09/15/2011                         Normal             

   

                                        <sup>3</sup>Interpretive Data: Heparin T

herapeutic Range:  57 - 92 

Seconds                                 [facility]                    

 

                HEMATOLOGY                         Hgb             9.3          

               12.0 - 16.0  

                          09/15/2011                         LOW                

    

                                                    [facility]            

        

 

                HEMATOLOGY                         MCV             81.2         

                81.0 - 99.0 

                          09/15/2011                         Normal             

   

                                                    [facility]            

        

 

                HEMATOLOGY                         Hct             28.7         

                36.0 - 48.0 

                          09/15/2011                         [facility]            

        

 

                HEMATOLOGY                         MCHC            32.4         

                32.0 - 36.0

                          09/15/2011                         Normal             

  

                                                    [facility]            

        

 

                HEMATOLOGY                         MCH             26.3         

                27.0 - 31.0 

                          09/15/2011                         [facility]            

        

 

                HEMATOLOGY                         RDW             18.8         

                11.5 - 14.5 

                          09/15/2011                         [facility]            

        

 

                HEMATOLOGY                         MPV             7.6          

               7.4 - 10.4   

                          09/15/2011                         Normal             

     

                                                    [facility]            

        

 

                HEMATOLOGY                         Platelet        373.0        

                 133 - 

450                         09/15/2011                         Normal           

                                                    [facility]            

        

 

                HEMATOLOGY                         WBC             7.3          

               3.7 - 10.4   

                          09/15/2011                         [facility]            

        

 

                HEMATOLOGY                         RBC             3.54         

                4.20 - 5.40 

                          09/15/2011                         [facility]            

        

 

                HEMATOLOGY                         Basophils #     0.0          

               0.0 -

0.2                         09/15/2011                         Normal           

                                                    [facility]            

        

 

                HEMATOLOGY                         Monocytes #     0.4          

               0.0 -

0.8                         09/15/2011                         [facility]            

        

 

                HEMATOLOGY                         Eosinophils #   0.0          

               0.0

- 0.5                         09/15/2011                         Normal         

                                                    [facility]            

        

 

                HEMATOLOGY                         Segs            77.9         

                45.0 - 75.0

                          09/15/2011                         [facility]            

        

 

                HEMATOLOGY                         Basophils       0.7          

               0.0 - 

1.0                         09/15/2011                         Normal           

                                                    [facility]            

        

 

                HEMATOLOGY                         Segs-Bands #    5.7          

               1.5 

- 8.1                         09/15/2011                         [facility]            

        

 

                HEMATOLOGY                         Eosinophils     0.7          

               0.0 -

4.0                         09/15/2011                         [facility]            

        

 

                HEMATOLOGY                         Lymphocytes #   1.1          

               1.0

- 5.5                         09/15/2011                         Normal         

                                                    [facility]            

        

 

                HEMATOLOGY                         Monocytes       5.7          

               2.0 - 

12.0                         09/15/2011                         Normal          

                                                    [facility]            

        

 

                HEMATOLOGY                         Lymphocytes     15.0         

                20.0

- 40.0                         09/15/2011                         LOW           

                                                    [facility]            

        

 

                URINALYSIS                         UA Urobilinogen ??           

              

0.1 - 1.0                         09/15/2011                         NA         

                                                    [facility]            

        

 

                    URINALYSIS                         UA Leuk Est         Small

 

*ABN*

                          (2011 20:45:00) ??                         >Nega

tive                      

                    09/15/2011                         ABN                      

                  

                                        [facility]                    

 

                    URINALYSIS                         UA Nitrite          Negat

letty 

                          (2011 20:45:00) ??                         >Nega

tive                      

                    09/15/2011                         Normal                   

                  

                                        [facility]                    

 

                URINALYSIS                         UA RBC          1.0          

               0 - 2     

                          09/15/2011                         Normal             

       

                                                    [facility]            

        

 

                URINALYSIS                         UA WBC          4.0          

               0 - 5     

                          09/15/2011                         Normal             

       

                                                    [facility]            

        

 

                    URINALYSIS                         UA Sq Epi           Many 

/LPF 

*ABN*

                    (2011 20:45:00) ??                         >Few       

                  

09/15/2011                         ABN                                          

                                        [facility]                    

 

                    URINALYSIS                         UA Bacteria         Occas

ional /HPF 

*NA*

                          (2011 20:45:00) ??                         >None

 Seen                     

                    09/15/2011                         [facility]                    

 

                    URINALYSIS                         UA Mucus            Few /

LPF 

*NA*

                          (2011 20:45:00) ??                         >None

 Seen                     

                    09/15/2011                         [facility]                    

 

                    URINALYSIS                         UA Bili             Negat

letty 

*NA*

                          (2011 20:45:00) ??                         >Nega

tive                      

                    09/15/2011                         NA                       

                  

                                        [facility]                    

 

                    URINALYSIS                         UA Blood            Negat

letty 

                          (2011 20:45:00) ??                         >Nega

tive                      

                    09/15/2011                         Normal                   

                  

                                        [facility]                    

 

                    URINALYSIS                         UA Ketones          Negat

letty mg/dL 

*NA*

                          (2011 20:45:00) ??                         >Nega

tive                      

                    09/15/2011                         [facility]                    

 

                    URINALYSIS                         UA Turbidity        Sligh

t 

*ABN*

                    (2011 20:45:00) ??                         >Clear     

                    

09/15/2011                         ABN                                          

                                        [facility]                    

 

                URINALYSIS                         UA Spec Grav    1.02         

                

<<=1.030                         09/15/2011                         Normal      

                                                    [facility]            

        

 

                    URINALYSIS                         UA Color            Yello

w 

*NA*

                          (2011 20:45:00) ??                         >[place]

ow                        

                    09/15/2011                         NA                       

                    

                                        [facility]                    

 

                URINALYSIS                         UA pH           6.5          

               5.0 - 8.0  

                          09/15/2011                         Normal             

    

                                                    [facility]            

        

 

                    URINALYSIS                         UA Protein          Negat

letty mg/dL 

                          (2011 20:45:00) ??                         >Nega

tive                      

                    09/15/2011                         Normal                   

                  

                                        [facility]                    

 

                    URINALYSIS                         UA Glucose          Negat

letty mg/dL 

*NA*

                          (2011 20:45:00) ??                         >Nega

tive                      

                    09/15/2011                         NA                       

                  

                                        [facility]                    



                                                                                
                                                                                
                                                                                
                                                                                
                                                                                
                                                                                
                                                



Pathology Reports





                                        No Data Provided for This Section       

             



                                                



Diagnostic Reports

                    



                    Report                         Value                        

 Date               

                                        Source                    

 

                                        Bone Density DXA Dual Energy [facility]         

                

BONE DENSITY ASSESSMENT: 2018

CLINICAL DATA: Post menopausal.  Osteoporosis.  Osteoporosis/Osteoporosis

RISK FACTORS: .  

FINDINGS: 

Bone density evaluation was performed 2018 on the right femur neck using a
Hologic unit. The BMD average for the exam is 0.591  g/cm2. The T-score is -2.30
and the Z-score is -0.20.  This matches the World Health Organization's criteria
for osteopenia and places the patient at a medium risk for fracture.  

An additional bone density evaluation was performed 2018 on the left femur
neck using a Hologic unit. The BMD average for the exam is 0.586  g/cm2. The T-
score is -2.40 and the Z-score is -0.20.  This matches the World Health 
Organization's criteria for osteopenia and places the patient at a medium risk 
for fracture.  

An additional bone density evaluation was performed 2018 on the right hip 
using a Hologic unit. The BMD average for the exam is 0.606  g/cm2. The T-score 
is -2.80 and the Z-score is -0.70.  This matches the World Health Organization's
criteria for osteoporosis and places the patient at a high risk for fracture.  

An additional bone density evaluation was performed 2018 on the left hip 
using a Hologic unit. The BMD average for the exam is 0.627  g/cm2. The T-score 
is -2.60 and the Z-score is -0.50.  This matches the World Health Organization's
criteria for osteoporosis and places the patient at a high risk for fracture.  

An additional bone density evaluation was performed 2018 on the AP L1-L4 
region of spine using a Hologic unit. Complete risk assessment of this region 
was not determined.  

An additional bone density evaluation was performed 2018 on the AP L1-L4 
region of spine using a Hologic unit. The BMD average for the exam is 0.764  
g/cm2. The T-score is -2.60.  This matches the World Health Organization's 
criteria for osteoporosis and places the patient at a high risk for fracture.  

IMPRESSION: OSTEOPOROSIS

Patient is at high risk for fracture.    This exam was interpreted at [id number] 
for RUPERTO [facility].  

Kenn Dupree M.D., cm/carline:2018 11:51:51  

Imaging Technologist(s): Alexandra Guerrero RT(R)(M), [facility]

                          2018                          [facility]   

 

               

 

                          Knee 1-2 Views unilateral DX                         R

ight Knee x-ray 2 views 

History: 76-year-old female with right knee pain.

Comparison: None.

Findings:

Bones are osteopenic.

Bones are aligned and there is no fracture or subluxation.

Joint spaces are narrowed at all 3 compartments suggesting cartilage loss. There
are small osteophytes at posterior tibial plateau margin and at superior 
patellar contour.

A small knee effusion is present.

The patella appears eccentric and mild displaced lateral.

No soft tissue calcifications seen.

Impression:

No acute knee abnormality.

Bones are osteopenic with narrowing of 4

Narrowing of 3 compartments of knee with small tibial plateau osteophytes 
concordant with moderate osteoarthritis.

 

                          10/24/2016                          [facility]   

 

               

 

                          Bone Density-Dual Energy Absorptionmetry              

           - Bone Density-

Dual Energy Absorptionmetry

 BONE DENSITY EVALUATION: 2013

CLINICAL DATA: Post menopausal.  

RISK FACTORS: .  

FINDINGS: 

Bone density evaluation was performed 2013 on the AP L1-L4 region of spine
using Lunar Dual Energy X-Ray Absorptiometry. The BMD average for the exam is 
0.850  g/cm2. The T-score is -2.70 and the Z-score is -0.80.  These values 
indicate 90.0% for age-matched controls.  This matches the World Health 
Organization's criteria for osteoporosis and places the patient at a high risk 
for fracture.  

An additional bone density evaluation was performed 2013 on the right 
femur neck using Lunar Dual Energy X-Ray Absorptiometry. The BMD average for the
exam is 0.800  g/cm2. The T-score is -1.70 and the Z-score is 0.30.  These 
values indicate 106.0% for age-matched controls.  This matches the World Health 
Organization's criteria for osteopenia and places the patient at a medium risk 
for fracture.  

An additional bone density evaluation was performed 2013 on the right hip 
using Lunar Dual Energy X-Ray Absorptiometry. The BMD average for the exam is 
0.756  g/cm2. The T-score is -2.00 and the Z-score is -0.20.  These values 
indicate 97.0% for age-matched controls.  This matches the World Health 
Organization's criteria for osteopenia and places the patient at a medium risk 
for fracture.  

An additional bone density evaluation was performed 2013 on the left femur
neck using Lunar Dual Energy X-Ray Absorptiometry. The BMD average for the exam 
is 0.783  g/cm2. The T-score is -1.80 and the Z-score is 0.20.  These values 
indicate 103.0% for age-matched controls.  This matches the World Health 
Organization's criteria for osteopenia and places the patient at a medium risk 
for fracture.  

An additional bone density evaluation was performed 2013 on the left hip 
using Lunar Dual Energy X-Ray Absorptiometry. The BMD average for the exam is 
0.738  g/cm2. The T-score is -2.10 and the Z-score is -0.30.  These values 
indicate 95.0% for age-matched controls.  This matches the World Health 
Organization's criteria for osteopenia and places the patient at a medium risk 
for fracture.  

IMPRESSION: OSTEOPOROSIS

Patient is at high risk for fracture.  Patient consult w/primary care provider 
is recommended.  

Mae mccormack/carline:2013 15:36:20  

Imaging Technologist: Reed Escalona [facility]

                          2013                          [facility]   

 

               

 

                          Digital Mammo Screening [facility]                        

 - DIGITAL MAMMO SCREENING

[facility]

BILATERAL DIGITAL SCREENING MAMMOGRAM WITH CAD: 2013

CLINICAL: Routine.  

Current study was evaluated with a Computer Aided Detection (CAD) system.  

No prior exams were available for comparison.  

There are scattered fibroglandular elements in both breasts that could obscure a
lesion on mammography.  

No significant masses, calcifications, or other findings are seen in either 
breast.  

IMPRESSION: NEGATIVE

There is no mammographic evidence of malignancy.  A screening mammogram in one 
year is recommended. 

Dr. Brady Gray M.D.          

eoc/penrad:2013 17:10:22  

Imaging Technologist: Apple Sawant RT(R)(M), [facility]

letter sent: Normal exam  

Mammogram BI-RADS: 1 Negative

                          2013                         [facility]   

 

               



                                                                                
                                   



Consultation Notes

                    



                                        No Data Provided for This Section       

             



                                                            



Discharge Summaries

                    



                                        No Data Provided for This Section       

             



                                                            



History and Physicals

                    



                                        No Data Provided for This Section       

             



                                                                



Vital Signs

                     



                    Vital Sign                         Value                    

     Date           

                          Comments                         Source               

     

 

                    Temperature Oral (F)                         97.1 F         

                

09/15/2011                                                   [facility]                    

 

                    Systolic (mm Hg)                         119.0              

            

09/15/2011                                                   [facility]                    

 

                    Diastolic (mm Hg)                         62.0              

            

09/15/2011                                                   [facility]                    

 

                    Peripheral Pulse Rate                         71.0          

                

09/15/2011                                                   [facility]                    

 

                    Respitory Rate                         18.0                 

         09/15/2011 

                                                    [facility]     

    

          

 

                    Height                         157.48 cm                    

     09/15/2011     

                                                    [facility]     

        

      

 

                    Weight                         54.545                       

   09/15/2011       

                                                    [facility]     

          

    

 

                    Respitory Rate                         16.0                 

         09/15/2011 

                                                    [facility]     

    

          

 

                    Peripheral Pulse Rate                         72.0          

                

09/15/2011                                                   [facility]                    

 

                    Temperature Oral (F)                         98.0 F         

                

09/15/2011                                                   [facility]                    

 

                    Systolic (mm Hg)                         161.0              

            

09/15/2011                                                   [facility]                    

 

                    Diastolic (mm Hg)                         81.0              

            

09/15/2011                                                   [facility]                    

 

                    Respitory Rate                         16.0                 

         09/15/2011 

                                                    [facility]            

    

   

 

                    Peripheral Pulse Rate                         79.0          

                

09/15/2011                                                   [facility]   

 

               

 

                    Diastolic (mm Hg)                         73.0              

            

09/15/2011                                                   [facility]   

 

               

 

                    Systolic (mm Hg)                         171.0              

            

09/15/2011                                                   [facility]   

 

               

 

                    Weight                         54.545                       

   09/15/2011       

                                                    [facility]     

          

    

 

                    Height                         157.48 cm                    

     09/15/2011     

                                                    [facility]     

        

      

 

                    Diastolic (mm Hg)                         89.0              

            

09/15/2011                                                   [facility]                    

 

                    Systolic (mm Hg)                         157.0              

            

09/15/2011                                                   [facility]                    

 

                    Respitory Rate                         18.0                 

         09/15/2011 

                                                    [facility]     

    

          

 

                    Peripheral Pulse Rate                         74.0          

                

09/15/2011                                                   [facility]                    

 

                    Respitory Rate                         18.0                 

         2011 

                                                    [facility]            

    

   

 

                    Temperature Oral (F)                         97.8 F         

                

2011                                                   [facility]   

 

               

 

                    Peripheral Pulse Rate                         73.0          

                

2011                                                   [facility]   

 

               

 

                    Diastolic (mm Hg)                         75.0              

            

2011                                                   [facility]   

 

               

 

                    Systolic (mm Hg)                         148.0              

            

2011                                                   [facility]   

 

               

 

                    Weight                         55.0                         

 2011         

                                                    [facility]            

        

 

                    Height                         157.48 cm                    

     2011     

                                                    [facility]            

        

 

                    Respitory Rate                         17.0                 

         2011 

                                                    [facility]            

    

   

 

                    Peripheral Pulse Rate                         83.0          

                

2011                                                   [facility]   

 

               

 

                    Temperature Oral (F)                         97.6 F         

                

2011                                                   [facility]   

 

               

 

                    Diastolic (mm Hg)                         80.0              

            

2011                                                   [facility]   

 

               

 

                    Systolic (mm Hg)                         148.0              

            

2011                                                   [facility]   

 

               



                                                                                
                                                                                
                                                                                
                                                                                
                                                                                
                                                                                
                                                                                
                                                 



Encounters

                    



                    Location                         Location Details           

              

Encounter Type                         Encounter Number                         

Reason For Visit                         Attending Provider                     

                    ADM Date                         DC Date                    

     Status      

                                        Source                    

 

                    [facility]                                            

      Emergency     

                          [id number]                                          

       

                    KATIE MALONEADRIANA                          2011                         Discharged                         [facility]                                     

             

Emergency                         [id number]                                  

                          PALAK RUIZ                          2011   

           

                    09/15/2011                         Discharged               

          

[facility]                    

 

                    Not Sent                                                  Ou

tpatient            

                          [id number]                         [facility]

ST            

                          NON PHYSICIAN                          10/01/2012     

              

                                             Active                         [facility]

[facility]       

            

 

                          [facility]                    

                          

                          Outpt Diag Services                         [id number]

02                     

                                             Julian Barrios                     

     10/24/2016  

                          10/25/2016                                            

    

                                         [facility]                    

 

                          [facility]                    

                          

                          Outpt Diag Services                         [id number]

03                     

                                             Julian Barrios                     

     2018                                            

    

                                         [facility]                    



                                                                                
                                       



Procedures

        



                                        No Data Provided for This Section



                                                    



Assessment and Plan

                    



                                        No Data Provided for This Section       

             



                                     



Plan of Care





                                        No Data Provided for This Section       

             



                                                                



Social History

                    



                    Social History                         Date                 

        Source      

             

 

                          No data available for this section                    

     2018           

                                         [facility]                    



                                                                    



Family History

                    



                                        No Data Provided for This Section       

             



                                                            



Advance Directives

                    



                                        No Data Provided for This Section       

             



                                                            



Functional Status

                    



                                        No Data Provided for This Section

## 2020-08-29 NOTE — XMS REPORT
Continuity of Care Document

                             Created on: 2020



MORENO PAINTING

External Reference #: [id number]

: 1940

Sex: Female



Demographics





                          Address                   [address] [place]  [address]

 

                          Home Phone                [phone]

 

                          Preferred Language        English

 

                          Marital Status            Unknown

 

                          Mosque Affiliation     Unknown

 

                          Race                      Unknown

 

                                        Additional Race(s) 

Other

Other





 

                          Ethnic Group              /Latin





Author





                          Author                    [facility]

t

 

                          Organization              [facility]

 

                          Address                   [address]

[place]  [address]



 

                          Phone                     Unavailable







Support





                Name            Relationship    Address         Phone

 

                    JONI PAINTING   PRS                 [address] [place], [place]  [id number] [phone]

 

                    RYAN PRIETO       PRS                 [address] [place], [place]  [address] [phone]







Care Team Providers





                    Care Team Member Name Role                Phone

 

                    MIKE PRIETO      PCP                 [phone]

 

                    RUSTY HILLIARD      Attphys             Unavailable

 

                    EVAN SALGUERO Attphys             Unavailable

 

                    REED ZHANG  Attphys             Unavailable

 

                    JOSH ALLISON       Attphys             Unavailable

 

                    Andrey Barrios Attphys             [phone]

 

                    MELY HAN   Attphys             Unavailable

 

                    JOSH ALLISON       Admphys             Unavailable







Payers





           Payer Name Policy Type Policy Number Effective Date Expiration Date [facility]

 

           Medicare A & B            [id number] 2004 00:00:00            C

[facility]

 

           TM                  [id number]                        [facility]

 

           [employer] Hmo             [id number] 2016 00:00:00            [facility]







Problems





           Condition Name Condition Details Condition Category Status     Onset 

Date Resolution

Date            Last Treatment Date Treating Clinician Comments        Source

 

                          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                

         M81.0 - AGE-

RELATED OSTEOPOROSIS W/O C                        Active                        
2018                                                                      
                         [facility]                     Diagnosis         

  Active              

2018 00:01:00              2018 09:41:00                           ELIZABETH Pérez

 

                          M25.561 - PAIN IN RIGHT KNEE                         M

25.561 - PAIN IN RIGHT 

KNEE                        Active                        10/20/2016            
                                                                                
  [facility]                     Diagnosis    Active       2016-10-20 00:0

1:00              

2016-10-24 11:57:00                                         Shanti Pérez

 

       Chest pain Chest pain Problem Active 2016 00:00:00                 

            [facility]

 

       Fever  Fever  Problem Active 2016 00:00:00                         

    [facility]

 

             Urinary tract infection UTI (urinary tract infection) Problem      

Active       2016 

00:00:00                                                         [facility]

 

       Volume depletion Volume depletion Problem Active 2016 00:00:00     

                        [facility]

 

       Vomiting Vomiting Problem Active 2016 00:00:00                     

        [facility]

 

       Abdominal pain Abdominal pain Problem Active 2015 00:00:00         

                    [facility]

 

       Non-specific colitis Colitis Problem Active 2015 00:00:00          

                   [facility]

 

       Weakness Weakness Problem Active 2015 00:00:00                     

        [facility]

 

       Enteritis Enteritis Problem Active 2015 00:00:00                   

          [facility]

 

       Fever  Fever  Problem Active 2015 00:00:00                         

    [facility]

 

          Elevated troponin level Elevated troponin Problem   Active     00:00:00            

                                                            [facility]

 

                          BENIGN THYROID CYST                               NESTOR

GN THYROID CYST                 

      Active                        2012                                  
                                                             [facility]       
             Diagnosis Active  2012 00:00:00         2012-10-01 14:25:00  

               

[facility]

 

                          FALL                                              FALL

                        Active                 

      2011                                                                
                               [facility]                     Diagnosis   

              

Active     2011 07:00:00            2011 18:51:00                   

    [facility]

 

                          CEREBRAL CONTUSION, DWAYNE ZYGOMATIC ARCH FX             

            CEREBRAL 

CONTUSION, DWAYNE ZYGOMATIC ARCH FX                        Active                  
     2011                                                                 
                              [facility]                     

Diagnosis Active    2011 00:00:00           2011 23:41:00           

          [facility]

 

       Chronic anemia Chronic anemia Problem Active                             

       [facility]

 

       Fall   Fall   Problem Active                                    [facility]

 

       Occult blood in stools Guaiac positive stools Problem Active             

                       [facility]

 

                          Non-ST elevation (NSTEMI) myocardial infarction Non-ST

ADRIANA (non-ST elevated 

myocardial infarction) Problem Active                                          C

[facility]

 

       Vomiting and diarrhea Vomiting and diarrhea Problem Active               

                     [facility]

 

                          CYST OF THYROID                                   CYST

 OF THYROID                        

Active                                                                          
                                             [facility]                     

Diagnosis Active                        2012-10-01 14:25:00                     

[facility]







Allergies, Adverse Reactions, Alerts





        Allergy Name Allergy Type Status  Severity Reaction(s) Onset Date Inacti

ve Date 

Treating Clinician        Comments                  Source

 

       No Known Allergies DA     Active U             2019-10-13 00:00:00       

               [facility]

 

       Latex  Allergy to Substance Active Moderate Rahses 2019 00:00:00   

                   [facility]

 

           Pregabalin Propensity to adverse reactions Active     Moderate   Drow

siness 2019

00:00:00                                                        [facility]

 

       No Known Allergies DA     Active U             2019 00:00:00       

               [facility]

 

       No Known Allergies DA     Active U             2016 00:00:00       

               [facility]







Social History





           Social Habit Start Date Stop Date  Quantity   Comments   Source

 

           Social History 2018 04:59:00 2018 04:59:00               

        [facility]







Medications





             Ordered Medication Name Filled Medication Name Start Date   Stop Da

te    Current 

Medication? Ordering Clinician Indication Dosage     Frequency  Signature (SIG) 

Comments                  Components                Source

 

                          Cephalexin Monohydrate (Keflex) 500 Mg Capsule Cephale

sarah Monohydrate (Keflex) 

500 Mg Capsule 2019 00:00:00         Yes     Josh Allison Md         500   

          Every 12 Hours  

                                                    [facility]

 

                Dicyclomine Hcl 10 Mg Capsule Dicyclomine Hcl 10 Mg Capsule 2018

-07-15 00:00:00 

        Yes     Tio Vasquez Np         10              Three Times A Day     

            [facility]

 

           Mesalamine 400 Mg Cap Mesalamine 400 Mg Cap 2018-07-15 00:00:00      

      Yes        Tio Vasquez Np           400                 Daily                         [facility]

 

                    Rifaximin (Xifaxan) 200 Mg Tablet Rifaximin (Xifaxan) 200 Mg

 Tablet 2018-07-15 

00:00:00        Yes    Tio Vasquez Np        500           Every 12 Hours    

           [facility]

 

           Prednisone 20 Mg Tab Prednisone 20 Mg Tab 2018 00:00:00        

    Yes        Josh Allison Md                  20                  Daily                         [facility]

 

                          Ciprofloxacin Hcl (Cipro) 500 Mg Tablet, 500 Mg Oral C

iprofloxacin Hcl (Cipro) 

500 Mg Tablet, 500 Mg Oral 2018 00:00:00 2018-07-15 00:00:00 No           

   Josh Allison Md            500                 Every 12 Hours                     [facility]

 

                          Metronidazole (Flagyl) 500 Mg Tablet, 500 Mg Oral Metr

onidazole (Flagyl) 500 Mg 

Tablet, 500 Mg Oral 2018 00:00:00 2018-07-15 00:00:00 No         Josh Jam

es Md            

500                       Three Times A Day                           [facility]

 

                          Ondansetron (Zofran Odt) 4 Mg Tab.rapdis, 4 Mg Oral On

dansetron (Zofran Odt) 4 

Mg Tab.rapdis, 4 Mg Oral 2018 00:00:00 2018-07-15 00:00:00 No             

 Josh Allison Md                  4                   Q4-6H Prn                     [facility]

 

                          Ondansetron (Zofran Odt) 4 Mg Tab.rapdis, 4 Mg Oral On

dansetron (Zofran Odt) 4 

Mg Tab.rapdis, 4 Mg Oral 2018 00:00:00 2018-07-15 00:00:00 No             

 Josh Allison Md                  4                   Q4-6H Prn                     [facility]

 

                          Cephalexin Monohydrate (Keflex) 500 Mg Capsule, 500 Mg

 Oral Cephalexin 

Monohydrate (Keflex) 500 Mg Capsule, 500 Mg Oral 2018 00:00:00        

00:00:00 No      Josh Allison Md         500             Every 12 Hours          

       [facility]

 

           Hydralazine Hcl 25 Mg Tab Hydralazine Hcl 25 Mg Tab 2018 00:00:

00            Yes        

Josh Allison Md           25                  Every 8 Hours                     C

[facility]

 

                          Nifedipine (Nifedipine Er) 30 Mg Tab.er.24 Nifedipine 

(Nifedipine Er) 30 Mg 

Tab.er.24 2018 00:00:00       Yes   Josh Allison Md       30          Every

 12 Hours             [facility]

 

                          Ondansetron (Zofran Odt) 4 Mg Tab.rapdis Ondansetron (

Zofran Odt) 4 Mg 

Tab.rapdis 2018 00:00:00       Yes   Josh Allison Md       4           Ever

y 6 Hours             [facility]

 

                    Potassium Chloride 20 Meq Tab.er.prt Potassium Chloride 20 M

eq Tab.er.prt 

2018 00:00:00        Yes    Josh Allisno Md        20            Daily     

           [facility]

 

                    Mesalamine 400 Mg Cap, 800 Mg Oral Mesalamine 400 Mg Cap, 80

0 Mg Oral 2018

00:00:00 2018 00:00:00 No     Josh Allison Md        800           Three Ti

mes A Day               [facility]

 

                          Metronidazole (Flagyl) 500 Mg Tablet, 500 Mg Oral Metr

onidazole (Flagyl) 500 Mg 

Tablet, 500 Mg Oral 2018 00:00:00 2018 00:00:00 No         Josh Jam

es Md            

500                       Three Times A Day                           [facility]

 

                          Pantoprazole Sodium (Protonix) 40 Mg Tablet., 40 Mg 

Oral Pantoprazole Sodium 

(Protonix) 40 Mg Tablet.dr, 40 Mg Oral 2018 00:00:00 2018 00:00:00 

No      Josh Allison Md         40              Every 12 Hours                 

I [facility]

 

           Apixaban 2.5 Mg Tablet Apixaban 2.5 Mg Tablet 2016-06-10 00:00:00    

        Yes        

Lenora Campos Md           5                   Every 12 Hours         

            [facility]

 

                          Levofloxacin (Levaquin) 500 Mg Tablet, 500 Mg Oral Lev

ofloxacin (Levaquin) 500 

Mg Tablet, 500 Mg Oral 2016-06-10 00:00:00 2018 00:00:00 Joyce Campos Md           500                 Daily                         [facility]

 

                          Methylprednisolone (Medrol Dose Pack) 4 Mg/Dose Pack T

ab, 4 Mg Oral 

Methylprednisolone (Medrol Dose Pack) 4 Mg/Dose Pack Tab, 4 Mg Oral 2016-06-10 

00:00:00 2018 00:00:00 No     Lenora Campos Md        4         

    Daily                [facility]

 

                          Dicyclomine Hcl (Bentyl) 20 Mg Tablet, 20 Mg Oral Dicy

clomine Hcl (Bentyl) 20 Mg

Tablet, 20 Mg Oral 2015 00:00:00 2018-07-15 00:00:00 No              Brady Campos Md           20                  Four Times Daily                    

 [facility]

 

                          Mesalamine (Pentasa) 500 Mg Capcr, 1000 Mg Oral Mesala

mine (Pentasa) 500 Mg 

Capcr, 1000 Mg Oral 2015 00:00:00 2018-07-15 00:00:00 Joyce Campos Md           1000                Four Times Daily                    

 [facility]

 

                          Levofloxacin (Levaquin) 500 Mg Tablet, 500 Mg Oral Lev

ofloxacin (Levaquin) 500 

Mg Tablet, 500 Mg Oral 2015 00:00:00 2016 00:00:00 Joyce Campos Md           500                 Daily                         [facility]

 

                          Metronidazole 500 Mg Tablet, 500 Mg Oral Metronidazole

 500 Mg Tablet, 500 Mg 

Oral       2015 00:00:00 2016 00:00:00 Joyce rainey Md            500

                          Three Times A Day                           [facility]

 

        Vicodin 5/500 oral tablet         2011-09-15 05:58:12         Yes     Leander White          

                                1 tab, PO, Q4-6H, PRN, 30 tab, for Pain, Substit

ution Allowed, Maintenance                  

                                        [facility]

 

           acetaminophen-hydrocodone 500 mg-5 mg oral tablet            2011-09-

15 05:11:00            No         

Jovan Garza                                                 1 tab, Rout

e: PO, Drug Form: TAB, ONCE, STAT, Start 

date: 09/15/11 0:11:00, Stop date: 09/15/11 0:11:00                             

            [facility]

 

       morphine Sulfate        2011-09-15 03:35:00        No     Meredith Rangel                      2 mg, 

Route: IVP, ONCE, Priority: STAT, Start date: 11 22:35:00, Stop date: 
11 22:35:00                                           [facility]

 

       Unknown Home Medication        2011 22:32:47        Yes            

                    Substitution Allowed

                                                            [facility]

 

           ondansetron 4 mg oral tablet, disintegrating            2011 22

:25:00            No         

Cris Noland                                                  1 tab, Route: PO

, ONCE, Start date: 11 17:25:00, Stop

date: 11 17:25:00                                         [facility]

 

       morphine Sulfate        2011 22:25:00        No     Cris Noland 

                     4 mg, Route: 

IM, ONCE, Start date: 11 17:25:00, Stop date: 11 17:25:00           

                              

[facility]

 

     Azathioprine 50 Mg Tab Azathioprine 50 Mg Tab           Yes            150 

      Daily           [facility]

 

                          Calcium Citrate/Vitamin D3 (Citracal + D Maximum Rowena

t) 1 Each Tablet Calcium 

Citrate/Vitamin D3 (Citracal + D Maximum Caplet) 1 Each Tablet                 Y

es                     1               

Daily                                                       [facility]

 

     Famotidine 20 Mg Tab Famotidine 20 Mg Tab           Yes            20      

  Twice A Day           [facility]

 

        Ferrous Sulfate 325 Mg Tablet. Ferrous Sulfate 325 Mg Tablet.       

          Yes                     

325                       Daily                                  [facility]

 

     Lisinopril 10 Mg Tablet Lisinopril 10 Mg Tablet           Yes            10

        Daily           [facility]

 

     Lovastatin 20 Mg Tablet Lovastatin 20 Mg Tablet           Yes            20

        Daily           [facility]

 

       Metronidazole 500 Mg Tablet Metronidazole 500 Mg Tablet               Yes

                  500           Every 

8 Hours                                                     [facility]

 

      Sertraline Hcl 50 Mg Tablet Sertraline Hcl 50 Mg Tablet             Yes   

            25          Daily  

                                                    [facility]

 

                          Tolterodine Tartrate (Detrol La) 4 Mg Cap.er.24h Tolte

rodine Tartrate (Detrol 

La) 4 Mg Cap.er.24h             Yes               4           Daily             

[facility]

 

                    Zolpidem Tartrate (Ambien) 10 Mg Tablet Zolpidem Tartrate (A

mbien) 10 Mg Tablet 

              Yes                  10            Bedtime as needed for Insomnia 

              [facility]

 

                          Alendronate Sodium 70 Mg Tablet, 70 Mg Oral Alendronat

e Sodium 70 Mg Tablet, 70 

Mg Oral         2019 00:00:00 No                      70              Use 

As Directed as needed for Mild 

Pain (1-3)                                                  [facility]

 

                          Aspirin (Aspir 81) 81 Mg Tablet., 81 Mg Oral Aspirin

 (Aspir 81) 81 Mg 

Tablet., 81 Mg Oral       2019 00:00:00 No                81          Da

carina             [facility]

 

                    Cetirizine Hcl 10 Mg Tablet, 10 Mg Oral Cetirizine Hcl 10 Mg

 Tablet, 10 Mg Oral 

       2019 00:00:00 No                   10            Daily             

   [facility]

 

                          Cholestyramine (With Sugar) (Questran Packet) 4 Gm Pac

ket, 4 Gm Oral 

Cholestyramine (With Sugar) (Questran Packet) 4 Gm Packet, 4 Gm Oral            

               

2019 00:00:00 No                      4               Twice A Day         

        [facility]

 

                          Escitalopram Oxalate 10 Mg Tablet, 10 Mg Oral Escitalo

pram Oxalate 10 Mg Tablet,

10 Mg Oral       2019 00:00:00 No                10          Daily        

     [facility]

 

                          Lansoprazole (Prevacid) 30 Mg Capsule.dr, 30 Mg Oral L

ansoprazole (Prevacid) 30 

Mg Capsule.dr, 30 Mg Oral       2019 00:00:00 No                30        

  Daily             [facility]

 

                          Loperamide Hcl (Loperamide) 2 Mg Tablet, 2 Mg Oral Lop

eramide Hcl (Loperamide) 2

Mg Tablet, 2 Mg Oral         2019 00:00:00 No                      2      

         Before Meals as needed for

Diarrhea                                                    [facility]

 

                Loratadine 10 Mg Tablet, 10 Mg Oral Loratadine 10 Mg Tablet, 10 

Mg Oral                 

2019 00:00:00 No                      10              Daily               

    [facility]

 

                Meloxicam 7.5 Mg Tablet, 7.5 Mg Oral Meloxicam 7.5 Mg Tablet, 7.

5 Mg Oral                 

2019 00:00:00 No                      7.5             Daily               

    [facility]

 

                          Pantoprazole Sodium (Protonix) 40 Mg Tablet.dr, 40 Mg 

Oral Pantoprazole Sodium 

(Protonix) 40 Mg Tablet., 40 Mg Oral         2019 00:00:00 No           

           40              Daily    

                                                    [facility]

 

                          Temazepam (Restoril) 15 Mg Capsule, 15 Mg Oral Temazep

am (Restoril) 15 Mg 

Capsule, 15 Mg Oral       2019 00:00:00 No                15          Bedt

josemanuel             [facility]

 

                          Ondansetron (Zofran Odt) 4 Mg Tab.rapdis, 4 Mg Oral On

dansetron (Zofran Odt) 4 

Mg Tab.rapdis, 4 Mg Oral         2018-07-15 00:00:00 No                      4  

             Twice A Day as needed 

for Nausea And Vomiting                                         [facility]

 

                          Metoprolol Tartrate 25 Mg Tablet, 25 Mg Oral Metoprolo

l Tartrate 25 Mg Tablet, 

25 Mg Oral       2018 00:00:00 No                25          Twice A Day  

           [facility]

 

                          Potassium Gluconate 2.5 Meq Tablet, 1 Cap Oral Potassi

um Gluconate 2.5 Meq 

Tablet, 1 Cap Oral         2018 00:00:00 No                      1        

       Twice A Day as needed for 

Diarrhea                                                    [facility]

 

                          Meloxicam (Mobic*) 7.5 Mg Tablet, 7.5 Mg Oral Meloxica

m (Mobic*) 7.5 Mg Tablet, 

7.5 Mg Oral       2018 00:00:00 No                7.5         Daily       

      [facility]

 

                Pantoprazole , 40 Mg Intraven Pantoprazole , 40 Mg Intraven     

            2018-01-10 

00:00:00 No                      40              Daily                   [facility]

 

                          Tramadol Hcl (Ultram) 50 Mg Tablet, 50 Mg Oral Tramado

l Hcl (Ultram) 50 Mg 

Tablet, 50 Mg Oral         2016 00:00:00 No                      50       

       Twice A Day as needed for 

Pain                                                        [facility]

 

                          Zolpidem Tartrate (Ambien) 5 Mg Tablet, 5 Mg Oral Zolp

idem Tartrate (Ambien) 5 

Mg Tablet, 5 Mg Oral         2016 00:00:00 No                      5      

         As Needed as needed for 

Insomnia                                                    [facility]

 

                                        Tramadol Hcl/Acetaminophen (Acetaminophn

-Tramadol 325-37.5) 1 Each Tablet, 37.5-

325 Mg Oral                             Tramadol Hcl/Acetaminophen (Acetaminophn

-Tramadol 325-37.5) 1 Each 

Tablet, 37.5-325 Mg Oral       2014 00:00:00 No                           

 Every 6 Hours             [facility]







Vital Signs





             Vital Name   Observation Time Observation Value Comments     Source

 

             Temperature Oral (F) 2011-09-15 06:20:00 97.1 F                    

[facility]

 

             Systolic (mm Hg) 2011-09-15 06:20:00                           [facility]

 

             Diastolic (mm Hg) 2011-09-15 06:20:00                           [facility]

 

             Peripheral Pulse Rate 2011-09-15 06:20:00                          

 [facility]

 

             Respitory Rate 2011-09-15 06:20:00                           [facility]

[facility]

 

             Height       2011-09-15 03:05:00 157.48 cm                 [facility]

 

             Weight       2011-09-15 03:05:00                           [facility]

 

             Respitory Rate 2011-09-15 03:05:00                           [facility]

[facility]

 

             Peripheral Pulse Rate 2011-09-15 03:05:00                          

 [facility]

 

             Temperature Oral (F) 2011-09-15 03:05:00 98.0 F                    

[facility]

 

             Systolic (mm Hg) 2011-09-15 03:05:00                           [facility]

 

             Diastolic (mm Hg) 2011-09-15 03:05:00                           [facility]

[facility]

 

             Respitory Rate 2011-09-15 01:52:00                           [facility]

[facility]

 

             Peripheral Pulse Rate 2011-09-15 01:52:00                          

 [facility]

 

             Diastolic (mm Hg) 2011-09-15 01:52:00                           [facility]

 

             Systolic (mm Hg) 2011-09-15 01:52:00                           [facility]

 

             Weight       2011-09-15 01:17:00                           [facility]

 

             Height       2011-09-15 01:17:00 157.48 cm                 [facility]

 

             Diastolic (mm Hg) 2011-09-15 01:17:00                           [facility]

[facility]

 

             Systolic (mm Hg) 2011-09-15 01:17:00                           [facility]

 

             Respitory Rate 2011-09-15 01:17:00                           [facility]

[facility]

 

             Peripheral Pulse Rate 2011-09-15 01:17:00                          

 [facility]

 

             Respitory Rate 2011 18:47:00                           [facility]

[facility]

 

             Temperature Oral (F) 2011 18:47:00 97.8 F                    

[facility]

 

             Peripheral Pulse Rate 2011 18:47:00                          

 [facility]

 

             Diastolic (mm Hg) 2011 18:47:00                           [facility]

[facility]

 

             Systolic (mm Hg) 2011 18:47:00                           [facility]

 

             Weight       2011 16:22:00                           [facility]

 

             Height       2011 16:22:00 157.48 cm                 [facility]

 

             Respitory Rate 2011 16:22:00                           [facility]

[facility]

 

             Peripheral Pulse Rate 2011 16:22:00                          

 [facility]

 

             Temperature Oral (F) 2011 16:22:00 97.6 F                    

[facility]

 

             Diastolic (mm Hg) 2011 16:22:00                           [facility]

[facility]

 

             Systolic (mm Hg) 2011 16:22:00                           [facility]







Procedures





                Procedure       Date / Time Performed Performing Clinician [facility]

e

 

                    Computed tomography of abdomen and pelvis with contrast 2020 00:00:00 

SCAR GIRON                          [facility]

 

                    Computed tomography of brain without radiopaque contrast 201

17 00:00:00 

RADHA SALGUERO                   [facility]

 

                    Computed tomography of abdomen and pelvis with contrast 2019 00:00:00 

RADHA SALGUERO                   [facility]

 

                X-ray of chest, two views 2019 00:00:00 BELEN ZHANG

 [facility]







Encounters





             Start Date/Time End Date/Time Encounter Type Admission Type Attendi

[facility]   Care Department Encounter ID    Source

 

           2020 15:31:00 2020 23:13:00 Departed Emergency Room 1    

      RUSTY HILLIARD 

[facility]              [id number]        [facility]

 

             2019 00:41:00 2019 20:53:00 Discharged Inpatient (obs) 

1            RADHA SALGUERO         [facility]          [id number]    [facility]

 

             2019 09:47:00 2019 12:40:00 Departed Emergency Room 1  

          BELEN ZHANG           [facility]          [id number]    [facility]

 

           2018 14:50:00 2018-07-15 14:16:00 Discharged Inpatient 1       

   JOSH ALLISON 

[facility]              [id number]        [facility]

 

             2018 15:49:00 2018 16:29:00 Discharged Inpatient (obs) 

1            BELEN ZHANG           [facility]          [id number]    [facility]

 

           2018 09:32:00 2018 23:59:00 Outpatient            Desiree Herndon 

[facility]              [id number]         

 

           2018 20:11:00 2018 09:50:00 Discharged Inpatient ER      

   JOSH ALLISON 

[facility]              [id number]        [facility]

 

             2018 09:58:00 2018 14:15:00 Departed Emergency Room ER 

          MELY HAN         [facility]          [id number]    [facility]

 

           2018-01-10 06:39:00 2018 19:01:00 Discharged Inpatient ER      

   JOSH ALLISON 

[facility]              [id number]        [facility]

 

           2016-10-24 11:47:00 2016-10-24 23:59:00 [facility]            [facility]              [id number]         







Results





           Test Description Test Time  Test Comments Results    Result Comments 

Source

 

                CT ABDOMEN/PELVIS W 2020 21:04:00                         

                              

                                               [place] [address]
     Patient Name: MORENO PAINTING                                   MR #: 
[id number]                     : 1940                                  
Age/Sex: 79/F  Acct #: [id number]                              Req #: [id number]
[id number]  Adm Physician:                                                      
Ordered by: SCAR GIRON MD                            Report #: [id number]    
   Location: ER                                      Room/Bed:                  
  
________________________________________________________________________________

___________________    Procedure: [id number] CT/CT ABDOMEN/PELVIS W  Exam Date: 
20                            Exam Time:                              
                REPORT STATUS: Signed    EXAMINATION: CT of the abdomen and 
pelvis with contrast.      TECHNIQUE:    Spiral CT images of the abdomen and 
pelvis were performed from the lung bases   to the lesser trochanters after the 
intravenous administration of 100 cc   Isovue-370. Coronal and sagittal 
reformatted images were obtained.      COMPARISON: CT abdomen and pelvis with 
contrast 2019      CLINICAL HISTORY:Right upper quadrant and midepigastric 
pain, diarrhea           DISCUSSION:      ABDOMEN/PELVIS:      LOWER THORAX:Mild
cardiomegaly, unchanged. Linear and reticular opacities in   the dependent lower
lobes compatible with subsegmental atelectasis. Large   hiatal hernia also unc
hanged.      HEPATOBILIARY: No focal hepatic lesions.  No intra-or extrahepatic 
biliary   ductal dilation.  Radiopaque calculi within an under distended 
gallbladder.      SPLEEN: No splenomegaly.      PANCREAS: No focal masses or 
ductal dilatation.       ADRENALS: No adrenal nodules.      KIDNEYS/URETERS: No 
hydronephrosis, stones, or solid mass lesions.      PELVIC ORGANS/BLADDER: The 
urinary bladder is unremarkable. Uterus is not   identified and may have been 
resected. No adnexal mass.      PERITONEUM/RETROPERITONEUM: Small volume pelvic 
ascites, average attenuation   10-15 Hounsfield units. No pneumoperitoneum.     
LYMPH NODES: No pelvic sidewall or retroperitoneal lymphadenopathy. Multiple   
mildly enlarged mesenteric lymph nodes for example in the lower abdomen   
measuring 1 cm short axis (series 2 image 53).      VESSELS: Atherosclerotic 
calcification of the abdominal aorta, major branch   vessels, and iliac arterial
systems without aneurysmal dilatation.      GI TRACT: The large bowel shows no 
distention or wall thickening. Narrowing of   the transverse colon and at the 
splenic flexure presumably due to peristalsis.   The appendix is not identified.
There is concentric wall thickening, mucosal   enhancement, and adjacent 
mesenteric stranding involving the terminal ileum   similar to prior. Duodenal 
diverticulum. Large proximal jejunal diverticulum.   The degree of upstream 
small bowel dilatation is less when compared to   2019.      BONES AND SOFT
TISSUE: Diffuse osteopenia. No acute osseous abnormalities.  No   focal soft 
tissue abnormalities.      IMPRESSION:       Overall similar findings of 
nonspecific distal enteritis with reactive   mesenteric lymphadenopathy and free
pelvic fluid compared to the examination of   2019. Findings are again 
concerning for inflammatory bowel disease   (Crohn's disease). No chris 
perforation or drainable fluid collection. No   upstream small bowel dilatation 
to suggest high-grade obstruction.      Persistent findings include choleli
thiasis, large hiatal hernia, and   atherosclerotic vascular disease.      
Signed by: Dr. Desiree Mason M.D. on 2020 9:18 PM        Dictated By: 
DESIREE MASON MD  Electronically Signed By: DESIREE MASON MD on 20  
Transcribed By: BILL on 20       COPY TO:   SCAR GIRON MD      
                                                     

 

                    Creatine Kinase MB  2020 20:21:00   

 

                                        Test Item

 

             Creatine Kinase MB (test code = 98989-9) 1.30         0-5.0        

              





[facility]roponin -59-04 20:21:00* 



             Test Item    Value        Reference Range Interpretation Comments

 

             Troponin I (test code = LWH3575) 0.005        0-0.300              

      





[facility] Jrfuz3517-20-12 20:06:00* 



             Test Item    Value        Reference Range Interpretation Comments

 

             Sodium Level (test code = 2951-2) 144          136-145             

       





[facility] RockPotassium Ieixk3715-96-89 20:06:00* 



             Test Item    Value        Reference Range Interpretation Comments

 

             Potassium Level (test code = 2823-3) 3.2          3.5-5.1      L   

          





[facility] RockChloride [id number] 20:06:00* 



             Test Item    Value        Reference Range Interpretation Comments

 

             Chloride Level (test code = 2075-0) 110                 H    

         





[facility] RockCarbon Dioxide Fjwxl7034-82-16 20:06:00* 



             Test Item    Value        Reference Range Interpretation Comments

 

             Carbon Dioxide Level (test code = 2028-9) 23           22-29       

               





[facility] RockAnion Jux1107-27-77 20:06:00* 



             Test Item    Value        Reference Range Interpretation Comments

 

             Anion Gap (test code = 33037-3) 14.2         8-16                  

     





[facility] RockBlood Urea Obhacwhl0173-47-39 20:06:00* 



             Test Item    Value        Reference Range Interpretation Comments

 

             Blood Urea Nitrogen (test code = 3094-0) 11           7-26         

              





[facility] RockCreatinine2020-01-11 20:06:00* 



             Test Item    Value        Reference Range Interpretation Comments

 

             Creatinine (test code = 2160-0) 0.68         0.57-1.11             

     





[facility] RockBUN/Creatinine Ozufm0900-51-84 20:06:00* 



             Test Item    Value        Reference Range Interpretation Comments

 

             BUN/Creatinine Ratio (test code = 3097-3) 16           6-25        

               





[facility] RockEstimat Glomerular Filtration Rate
2020 20:06:00* 



             Test Item    Value        Reference Range Interpretation Comments

 

             Estimat Glomerular Filtration Rate (test code = 529399901) > 60    

     >60                        





Ranges were taken from the National Kidney Disease Education Program and the [facility] literature.Reference ranges:60 or greater: Kxvspa20-83 (
for 3 consecutive months): Chronic kidney disease 15 or less: Kidney failureBaylor Scott & White Medical Center – Round RockGlucose Dbpaa8619-29-20 20:06:00* 



             Test Item    Value        Reference Range Interpretation Comments

 

             Glucose Level (test code = CYW6537) 129                 H    

         





[facility] RockCalcium Ibrhz7974-89-57 20:06:00* 



             Test Item    Value        Reference Range Interpretation Comments

 

             Calcium Level (test code = 88429-6) 8.5          8.4-10.2          

         





[facility] RockMagnesium Ybtjv8562-22-00 20:06:00* 



             Test Item    Value        Reference Range Interpretation Comments

 

             Magnesium Level (test code = 79338-8) 1.4          1.3-2.1         

           





[facility] RockTotal Zjyxlzanq5805-02-01 20:06:00* 



             Test Item    Value        Reference Range Interpretation Comments

 

             Total Bilirubin (test code = 1975-2) 0.2          0.2-1.2          

          





[facility] RockAspartate Amino Transf (AST/SGOT)
2020 20:06:00* 



             Test Item    Value        Reference Range Interpretation Comments

 

                                        Aspartate Amino Transf (AST/SGOT) (test 

code = Aspartate Amino Transf 

(AST/SGOT))     10              5-34                             





[facility] RockAlanine Aminotransferase (ALT/SGPT)
2020 20:06:00* 



             Test Item    Value        Reference Range Interpretation Comments

 

             Alanine Aminotransferase (ALT/SGPT) (test code = 1742-6) < 6       

   0-55                       





[facility] RockTotal Tjlffef7435-67-44 20:06:00* 



             Test Item    Value        Reference Range Interpretation Comments

 

             Total Protein (test code = 2885-2) 6.8          6.5-8.1            

        





[facility] RockAlbumin2020-01-11 20:06:00* 



             Test Item    Value        Reference Range Interpretation Comments

 

             Albumin (test code = 1751-7) 3.2          3.5-5.0      L           

  





[facility] RockGlobulin2020-01-11 20:06:00* 



             Test Item    Value        Reference Range Interpretation Comments

 

             Globulin (test code = 94758-1) 3.6          2.3-3.5      H         

    





[facility] RockAlbumin/Globulin Zjhyr6027-11-60 20:06:00
  * 



             Test Item    Value        Reference Range Interpretation Comments

 

             Albumin/Globulin Ratio (test code = 1759-0) 0.9          0.8-2.0   

                 





[facility] RockAlkaline Xsnprubosgn5006-22-94 20:06:00* 



             Test Item    Value        Reference Range Interpretation Comments

 

             Alkaline Phosphatase (test code = 6768-6) 58                 

               





[facility] RockCreatine Rkbxaq3598-96-07 20:06:00* 



             Test Item    Value        Reference Range Interpretation Comments

 

             Creatine Kinase (test code = 2157-6) 57                      

          





[facility] RockAmylase Nxwmr5559-68-12 20:06:00* 



             Test Item    Value        Reference Range Interpretation Comments

 

             Amylase Level (test code = 1798-8) 58                        

        





[facility] RockLipase2020-01-11 20:06:00* 



             Test Item    Value        Reference Range Interpretation Comments

 

             Lipase (test code = 3040-3) 15           8-78                      

 





[facility] RockProthrombin Lilu7862-82-35 20:04:00* 



             Test Item    Value        Reference Range Interpretation Comments

 

             Prothrombin Time (test code = 5902-2) 13.5         11.9-14.5       

           





[facility] RockProthromb Time International Ratio
2020 20:04:00* 



             Test Item    Value        Reference Range Interpretation Comments

 

             Prothromb Time International Ratio (test code = 6301-6) 0.98       

                             





Oral Anticoagulant Therapy INR Values:1. Low Intensity Therapy        1.5 - 2.02
. Moderate Intensity Therapy   2.0 - 3.03. High Intensity Therapy(1)    2.5 - 3.
54. High Intensity Therapy(2)    3.0 - 4.05. Panic Value INR              > 5.0
[facility] Round RockActivated Partial Thromboplast Time
2020 20:04:00* 



             Test Item    Value        Reference Range Interpretation Comments

 

             Activated Partial Thromboplast Time (test code = 06182-9) 28.8     

    23.8-35.5                  





[facility] RockWhite Blood Ymrzc0173-71-41 19:27:00* 



             Test Item    Value        Reference Range Interpretation Comments

 

             White Blood Count (test code = 6690-2) 7.56         4.8-10.8       

            





[facility] RockRed Blood Bpwnc1435-46-41 19:27:00* 



             Test Item    Value        Reference Range Interpretation Comments

 

             Red Blood Count (test code = 789-8) 3.71         3.6-5.1           

         





[facility] RockHemoglobin2020-01-11 19:27:00* 



             Test Item    Value        Reference Range Interpretation Comments

 

             Hemoglobin (test code = 37739-1) 10.6         12.0-16.0    L       

      





[facility] RockHematocrit2020-01-11 19:27:00* 



             Test Item    Value        Reference Range Interpretation Comments

 

             Hematocrit (test code = 4544-3) 33.3         34.2-44.1    L        

     





[facility] RockMean Corpuscular Wtogyk4331-64-45 
19:27:00* 



             Test Item    Value        Reference Range Interpretation Comments

 

             Mean Corpuscular Volume (test code = 787-2) 89.8         81-99     

                 





[facility] RockMean Corpuscular Ihyhfxwkks5507-70-54 
19:27:00* 



             Test Item    Value        Reference Range Interpretation Comments

 

             Mean Corpuscular Hemoglobin (test code = 785-6) 28.6         28-32 

                     





[facility] RockMean Corpuscular Hemoglobin Concent
2020 19:27:00* 



             Test Item    Value        Reference Range Interpretation Comments

 

             Mean Corpuscular Hemoglobin Concent (test code = 786-4) 31.8       

  31-35                      





[facility] RockRed Cell Distribution Fxqap8781-37-74 
19:27:00* 



             Test Item    Value        Reference Range Interpretation Comments

 

             Red Cell Distribution Width (test code = 92088-5) 15.9         11.7

-14.4    H             





[facility] RockPlatelet Hknet3264-25-08 19:27:00* 



             Test Item    Value        Reference Range Interpretation Comments

 

             Platelet Count (test code = 777-3) 295          140-360            

        





[facility] RockNeutrophils (%) (Auto)2020 19:27:00
  * 



             Test Item    Value        Reference Range Interpretation Comments

 

             Neutrophils (%) (Auto) (test code = 65195-9) 81.8         38.7-80.0

    H             





[facility] RockLymphocytes (%) (Auto)2020 19:27:00
  * 



             Test Item    Value        Reference Range Interpretation Comments

 

             Lymphocytes (%) (Auto) (test code = 736-9) 10.2         18.0-39.1  

  L             





[facility] RockMonocytes (%) (Auto)2020 19:27:00* 



             Test Item    Value        Reference Range Interpretation Comments

 

             Monocytes (%) (Auto) (test code = 5905-5) 6.3          4.4-11.3    

               





[facility] RockEosinophils (%) (Auto)2020 19:27:00
  * 



             Test Item    Value        Reference Range Interpretation Comments

 

             Eosinophils (%) (Auto) (test code = 713-8) 0.5          0.0-6.0    

                





[facility] RockBasophils (%) (Auto)2020 19:27:00* 



             Test Item    Value        Reference Range Interpretation Comments

 

             Basophils (%) (Auto) (test code = 706-2) 0.7          0.0-1.0      

              





[facility] RockIM GRANULOCYTES %2020 19:27:00* 



             Test Item    Value        Reference Range Interpretation Comments

 

             IM GRANULOCYTES % (test code = IM GRANULOCYTES %) 0.5          0.0-

1.0                    





[facility] RockNeutrophils # (Auto)2020 19:27:00* 



             Test Item    Value        Reference Range Interpretation Comments

 

             Neutrophils # (Auto) (test code = 751-8) 6.2          2.1-6.9      

              





[facility] RockLymphocytes # (Auto)2020 19:27:00* 



             Test Item    Value        Reference Range Interpretation Comments

 

             Lymphocytes # (Auto) (test code = 57606-3) 0.8          1.0-3.2    

  L             





[facility] RockMonocytes # (Auto)2020 19:27:00* 



             Test Item    Value        Reference Range Interpretation Comments

 

             Monocytes # (Auto) (test code = 742-7) 0.5          0.2-0.8        

            





[facility]osin[facility]Auto[facility] 19:27:00* 



             Test Item    Value        Reference Range Interpretation Comments

 

             Eosinophils # (Auto) (test code = 711-2) 0.0          0.0-0.4      

              





[facility] Rock[facility]ophils # (Auto)2020 19:27:00* 



             Test Item    Value        Reference Range Interpretation Comments

 

             Basophils # (Auto) (test code = 704-7) 0.1          0.0-0.1        

            





[facility] RockAbsolute Immature Granulocyte (auto
2020 19:27:00* 



             Test Item    Value        Reference Range Interpretation Comments

 

                                        Absolute Immature Granulocyte (auto (teri

t code = Absolute Immature Granulocyte 

(auto)          0.04            0-0.1                            





[facility] RockUrine [id number] 17:48:00* 



             Test Item    Value        Reference Range Interpretation Comments

 

             Urine WBC (test code = 5821-4) >50          0-5          H         

    





[facility] RockUrine CND2018-23-82 17:48:00* 



             Test Item    Value        Reference Range Interpretation Comments

 

             Urine RBC (test code = 65527-7) 21-50        0-5          H        

     





[facility] RockUrine Oqszoqwo1864-30-98 17:48:00* 



             Test Item    Value        Reference Range Interpretation Comments

 

             Urine Bacteria (test code = 69767-8) MANY         NONE         H   

          





[facility] RockUrine Epithelial Xbaiu0638-87-30 17:48:00
  * 



             Test Item    Value        Reference Range Interpretation Comments

 

             Urine Epithelial Cells (test code = 35616-0) FEW          NONE     

                  





[facility] SINGLE (PORTABLE)2020 
17:36:00                                                                        
             [facility]                        [address] [place] [address]      Patient Name: 
MORENO PAINTING                                   MR #: [id number]              
      : 1940                                   Age/Sex: 79/F  Acct #: 
[id number]                              Req #: [id number]  Adm Physician:     
                                                Ordered by: SCAR GIRON MD   
                        Report #: [id number]        Location: ER                 
                    Room/Bed:                     
_______________________________________________
____________________________________________________    Procedure: [id number] DX/
CHEST SINGLE (PORTABLE)  Exam Date: 20                            Exam Crow
e: 1718                                              REPORT STATUS: Signed    EX
AMINATION:  CHEST SINGLE (PORTABLE)         COMPARISON:  Chest x-ray 3/18/2019  
   INDICATION:  Abdominal pain       DISCUSSION:   Frontal view of the chest ob
tained at 1718 hours.      HEART AND MEDIASTINUM:  The heart is top normal in si
ze. Stable calcifications   throughout the aorta. Hiatal hernia is redemonstrate
d.      LINES:  None.      LUNGS:  Mild hyperinflation. No interstitial thickeni
ng. No mass or infiltrate.   Calcifications throughout the tracheal bronchial tr
ee.      PLEURA:  No pleural effusion or pneumothorax.      BONES AND SOFT TISSU
ES:  No focal osseous lesion. The soft tissues are normal.         IMPRESSION:  
 No acute cardiopulmonary disease. Stable pulmonary hyperinflation and hiatal   
hernia.      Signed by: Dr. Reggie Oswald MD on 2020 5:38 PM        D
ictated By: REGGIE OSWALD MD  Electronically Signed By: REGGIE OSWALD MD
on 20  Transcribed By: BILL on 20       COPY TO:   SCAR SAEZ MD         Urine Rnxre6941-68-55 17:22:00* 



             Test Item    Value        Reference Range Interpretation Comments

 

             Urine Color (test code = 5778-6) YELLOW       YELLOW               

      





[facility] Dyfhphn3065[facility]56-74 17:22:00* 



             Test Item    Value        Reference Range Interpretation Comments

 

             Urine Clarity (test code = 00171-2) CLOUDY       CLEAR        H    

         





[facility] RockUrine Specific Uznmugw2904-10-63 17:22:00
  * 



             Test Item    Value        Reference Range Interpretation Comments

 

             Urine Specific Gravity (test code = 5811-5) 1.030        1.010-1.02

5  H             





[facility] RockUrine iF3448-00-65 17:22:00* 



             Test Item    Value        Reference Range Interpretation Comments

 

             Urine pH (test code = 53744-6) 6            5-7                    

    





[facility] RockUrine Leukocyte Ywlkxvyr2354-14-94 
17:22:00* 



             Test Item    Value        Reference Range Interpretation Comments

 

             Urine Leukocyte Esterase (test code = 5799-2) MODERATE     NEGATIVE

                   





[facility] RockUrine Uwanxad6879-94-16 17:22:00* 



             Test Item    Value        Reference Range Interpretation Comments

 

             Urine Nitrite (test code = 14582-6) NEGATIVE     NEGATIVE          

         





[facility] RockUrine Msvbppm8658-03-30 17:22:00* 



             Test Item    Value        Reference Range Interpretation Comments

 

             Urine Protein (test code = 5804-0) NEGATIVE     NEGATIVE           

        





[facility] Rock[facility] Glucose (UA)2020 17:22:00* 



             Test Item    Value        Reference Range Interpretation Comments

 

             Urine Glucose (UA) (test code = 2349-9) NEGATIVE     NEGATIVE      

             





[facility] Vtqruir6462-09-32 17:22:00* 



             Test Item    Value        Reference Range Interpretation Comments

 

             Urine Ketones (test code = 25221-9) NEGATIVE     NEGATIVE          

         





[facility] RockU[facility] Zewvfbjgjcum4293-27-85 17:22:00* 



             Test Item    Value        Reference Range Interpretation Comments

 

             Urine Urobilinogen (test code = 74979-3) 0.2          0.2-1        

              





[facility] RockU[facility] Hfmirsmgr2646-67-47 17:22:00* 



             Test Item    Value        Reference Range Interpretation Comments

 

             Urine Bilirubin (test code = 1978-6) NEGATIVE     NEGATIVE         

          





[facility] RockUrine Jimvp2425-58-12 17:22:00* 



             Test Item    Value        Reference Range Interpretation Comments

 

             Urine Blood (test code = 49062-3) 3+           NEGATIVE            

       





[facility] Qwqcd3224-58-59 06:02:00* 



             Test Item    Value        Reference Range Interpretation Comments

 

             Sodium Level (test code = 2951-2) 141          136-145             

       





[facility] RockPotassium Emrqh3054-50-91 06:02:00* 



             Test Item    Value        Reference Range Interpretation Comments

 

             Potassium Level (test code = 2823-3) 4.0          3.5-5.1          

          





[facility] RockChloride Hrbll1946-95-38 06:02:00* 



             Test Item    Value        Reference Range Interpretation Comments

 

             Chloride Level (test code = 2075-0) 112                 H    

         





[facility] RockCarbon Dioxide Zwjxs9981-02-86 06:02:00* 



             Test Item    Value        Reference Range Interpretation Comments

 

             Carbon Dioxide Level (test code = 2028-9) 24           22-29       

               





[facility] RockAnion Kcd1214-94-88 06:02:00* 



             Test Item    Value        Reference Range Interpretation Comments

 

             Anion Gap (test code = 33037-3) 9.0          8-16                  

     





[facility] RockBlood Urea Qjnfuuxv7267-30-46 06:02:00* 



             Test Item    Value        Reference Range Interpretation Comments

 

             Blood Urea Nitrogen (test code = 3094-0) 5            7-26         

L             





[facility] RockCreatinine2019-07-19 06:02:00* 



             Test Item    Value        Reference Range Interpretation Comments

 

             Creatinine (test code = 2160-0) 0.56         0.57-1.11    L        

     





[facility] RockBUN/Creatinine Xtcpx7060-09-87 06:02:00* 



             Test Item    Value        Reference Range Interpretation Comments

 

             BUN/Creatinine Ratio (test code = 3097-3) 9            6-25        

               





[facility] RockEstimat Glomerular Filtration Rate
2019 06:02:00* 



             Test Item    Value        Reference Range Interpretation Comments

 

             Estimat Glomerular Filtration Rate (test code = 619373752) > 60    

     >60                        





Ranges were taken from the National Kidney Disease Education Program and the [employer] literature.Reference ranges:60 or greater: Fitrfk22-86 (
for 3 consecutive months): Chronic kidney disease 15 or less: Kidney failureBaylor Scott & White Medical Center – Round RockGlucose Xnesf1545-54-08 06:02:00* 



             Test Item    Value        Reference Range Interpretation Comments

 

             Glucose Level (test code = YLX7873) 90                       

         





[facility] RockCalcium Iaatn8072-44-49 06:02:00* 



             Test Item    Value        Reference Range Interpretation Comments

 

             Calcium Level (test code = 42954-5) 7.8          8.4-10.2     L    

         





[facility] RockTotal Nrflsdwud4911-75-30 06:02:00* 



             Test Item    Value        Reference Range Interpretation Comments

 

             Total Bilirubin (test code = 1975-2) 0.2          0.2-1.2          

          





[facility] RockAspartate Amino Transf (AST/SGOT)
2019 06:02:00* 



             Test Item    Value        Reference Range Interpretation Comments

 

                                        Aspartate Amino Transf (AST/SGOT) (test 

code = Aspartate Amino Transf 

(AST/SGOT))     9               5-34                             





[facility] RockAlanine Aminotransferase (ALT/SGPT)
2019 06:02:00* 



             Test Item    Value        Reference Range Interpretation Comments

 

             Alanine Aminotransferase (ALT/SGPT) (test code = 1742-6) < 6       

   0-55                       





[facility] Stephenvilletal Jvnlach3865-61-93 06:02:00* 



             Test Item    Value        Reference Range Interpretation Comments

 

             Total Protein (test code = 2885-2) 5.3          6.5-8.1      L     

        





[facility] RockAlbumin2019-07-19 06:02:00* 



             Test Item    Value        Reference Range Interpretation Comments

 

             Albumin (test code = 1751-7) 2.7          3.5-5.0      L           

  





[facility] RockGlobulin2019-07-19 06:02:00* 



             Test Item    Value        Reference Range Interpretation Comments

 

             Globulin (test code = 90155-2) 2.6          2.3-3.5                

    





[facility] RockAlbumin/Globulin Bqfll8185-18-76 06:02:00
  * 



             Test Item    Value        Reference Range Interpretation Comments

 

             Albumin/Globulin Ratio (test code = 1759-0) 1.0          0.8-2.0   

                 





[facility] RockAlkaline Gsgpgbclvna0008-23-60 06:02:00* 



             Test Item    Value        Reference Range Interpretation Comments

 

             Alkaline Phosphatase (test code = 6768-6) 42                 

               





[facility] RockWhite Blood Nrimz9956-80-58 05:27:00* 



             Test Item    Value        Reference Range Interpretation Comments

 

             White Blood Count (test code = 6690-2) 3.75         4.8-10.8     L 

            





[facility] RockRed Blood Pburx9781-59-73 05:27:00* 



             Test Item    Value        Reference Range Interpretation Comments

 

             Red Blood Count (test code = 789-8) 3.08         3.6-5.1      L    

         





[facility] RockHemoglobin2019-07-19 05:27:00* 



             Test Item    Value        Reference Range Interpretation Comments

 

             Hemoglobin (test code = 38792-8) 8.9          12.0-16.0    L       

      





[facility] RockHematocrit2019-07-19 05:27:00* 



             Test Item    Value        Reference Range Interpretation Comments

 

             Hematocrit (test code = 4544-3) 28.3         34.2-44.1    L        

     





[facility] RockMean Corpuscular Abqepx4320-67-11 
05:27:00* 



             Test Item    Value        Reference Range Interpretation Comments

 

             Mean Corpuscular Volume (test code = 787-2) 91.9         81-99     

                 





[facility] RockMean Corpuscular Uzulemjdzb9584-85-57 
05:27:00* 



             Test Item    Value        Reference Range Interpretation Comments

 

             Mean Corpuscular Hemoglobin (test code = 785-6) 28.9         28-32 

                     





[facility] Round RockMean Corpuscular Hemoglobin Concent
2019 05:27:00* 



             Test Item    Value        Reference Range Interpretation Comments

 

             Mean Corpuscular Hemoglobin Concent (test code = 786-4) 31.4       

  31-35                      





[facility] RockRed Cell Distribution Umzrz0819-79-66 
05:27:00* 



             Test Item    Value        Reference Range Interpretation Comments

 

             Red Cell Distribution Width (test code = 19828-0) 17.3         11.7

-14.4    H             





[facility] Round RockPlatelet Fvaai0550-31-77 05:27:00* 



             Test Item    Value        Reference Range Interpretation Comments

 

             Platelet Count (test code = 777-3) 267          140-360            

        





[facility] RockNeutrophils (%) (Auto)2019 05:27:00
  * 



             Test Item    Value        Reference Range Interpretation Comments

 

             Neutrophils (%) (Auto) (test code = 67428-5) 69.1         38.7-80.0

                  





[facility] RockLymphocytes (%) (Auto)2019 05:27:00
  * 



             Test Item    Value        Reference Range Interpretation Comments

 

             Lymphocytes (%) (Auto) (test code = 736-9) 17.3         18.0-39.1  

  L             





[facility] RockMonocytes (%) (Auto)2019 05:27:00* 



             Test Item    Value        Reference Range Interpretation Comments

 

             Monocytes (%) (Auto) (test code = 5905-5) 9.9          4.4-11.3    

               





[facility] RockEosinophils (%) (Auto)2019 05:27:00
  * 



             Test Item    Value        Reference Range Interpretation Comments

 

             Eosinophils (%) (Auto) (test code = 713-8) 1.9          0.0-6.0    

                





[facility] RockBasophils (%) (Auto)2019 05:27:00* 



             Test Item    Value        Reference Range Interpretation Comments

 

             Basophils (%) (Auto) (test code = 706-2) 1.3          0.0-1.0      

H             





[facility] RockIM GRANULOCYTES %2019 05:27:00* 



             Test Item    Value        Reference Range Interpretation Comments

 

             IM GRANULOCYTES % (test code = IM GRANULOCYTES %) 0.5          0.0-

1.0                    





[facility] RockNeutrophils # (Auto)2019 05:27:00* 



             Test Item    Value        Reference Range Interpretation Comments

 

             Neutrophils # (Auto) (test code = 751-8) 2.6          2.1-6.9      

              





[facility] RockLymphocytes # (Auto)2019 05:27:00* 



             Test Item    Value        Reference Range Interpretation Comments

 

             Lymphocytes # (Auto) (test code = 18311-8) 0.7          1.0-3.2    

  L             





[facility] RockMonocytes # (Auto)2019 05:27:00* 



             Test Item    Value        Reference Range Interpretation Comments

 

             Monocytes # (Auto) (test code = 742-7) 0.4          0.2-0.8        

            





[facility] RockEosinophils # (Auto)2019 05:27:00* 



             Test Item    Value        Reference Range Interpretation Comments

 

             Eosinophils # (Auto) (test code = 711-2) 0.1          0.0-0.4      

              





[facility] RockBasophils # (Auto)2019 05:27:00* 



             Test Item    Value        Reference Range Interpretation Comments

 

             Basophils # (Auto) (test code = 704-7) 0.1          0.0-0.1        

            





[facility] RockAbsolute Immature Granulocyte (auto
2019 05:27:00* 



             Test Item    Value        Reference Range Interpretation Comments

 

                                        Absolute Immature Granulocyte (auto (teri

t code = Absolute Immature Granulocyte 

(auto)          0.02            0-0.1                            





[facility] RockUrine TBL5550-64-50 23:54:00* 



             Test Item    Value        Reference Range Interpretation Comments

 

             Urine WBC (test code = 5821-4) 11-20        0-5          H         

    





[facility] RockUrine RHT2951-58-72 23:54:00* 



             Test Item    Value        Reference Range Interpretation Comments

 

             Urine RBC (test code = 27563-4) 6-10         0-5          H        

     





[facility] RockUrine Gwwufpqe6974-06-74 23:54:00* 



             Test Item    Value        Reference Range Interpretation Comments

 

             Urine Bacteria (test code = 78917-0) MANY         NONE         H   

          





[facility] RockUrine Epithelial Amvnd4873-90-64 23:54:00
  * 



             Test Item    Value        Reference Range Interpretation Comments

 

             Urine Epithelial Cells (test code = 17317-9) FEW          NONE     

                  





[facility] RockCT BRAIN XQ9609-84-88 23:54:00           
                                                                          [facility]                        [address] [address]      Patient Name: MORENO PAINTING          
                        MR #: [id number]                     : 1940    
                              Age/Sex: 78/F  Acct #: [id number]               
              Req #: [id number]  [facility] Physician:                                 
                    Ordered by: RADHA SALGUERO MD                        
   Report #: [id number]        Location: ER                                      
Room/Bed:                     ________________________________________
___________________________________________________________    Procedure: [id number]
035 CT/CT BRAIN WO  Exam Date: 19                            Exam Time:                                               REPORT STATUS: Signed    EXAMINA
TION: Head CT without contrast.           HISTORY:Headache, high blood pressure.
     COMPARISON:CT brain from 3/31/2018.   TECHNIQUE: Multidetector axial images
were obtained from the foramen magnum to   the vertex without contrast. The im
ages were reconstructed using brain and bone   algorithms.  Thin section brain i
mages were reformatted into coronal and   sagittal planes.   Dose modulation, it
erative reconstruction, and/or weight based adjustment of   the mA/kV was utiliz
ed to reduce the radiation dose to as low as reasonably   achievable.      Intra
venous contrast: None        IMAGE QUALITY: Acceptable.      FINDINGS:       Sku
ll/scalp: No lytic or blastic. lesions.  No surgical changes.   Unchanged bilate
ral sphenoid wing osseous demineralization with lytic changes.       Parenchyma:
Nonspecific bilateral frontoparietal patchy white matter   hypodensity are like
ly related to small vessel ischemic changes.   No acute hemorrhage, mass or acut
e major vascular territorial infarct.       Arteries: No density suggestive of t
hrombosis.          Dural sinuses: No abnormal density suggestive of thrombosis.
       Ventricles: No hydrocephalus or displacement.       Extra-axial spaces: 
No abnormal density.         Brain volume: Generalized age-related cerebral volu
me loss.       Craniocervical junction: No mass, Chiari malformation, or basilar
  invagination.       Sella: No mass.        Paranasal/mastoid sinuses: Imaged 
portions unremarkable.         IMPRESSION:   No acute intracranial abnormality, 
particularly no acute hemorrhage, mass or   acute major vascular territorial inf
arct.      Mild to moderate supratentorial white matter microvascular ischemic c
hanges.      Generalized age-related cerebral volume loss.      Signed by: Dr. EARLENE Owens M.D. on 2019 11:59 PM        Dictated By: PANFILO IBRAHIM  Electronically Signed By: PANFILO OWENS MD on 19  Transcribed By
: BILL on 19       COPY TO:   RADHA SALGUERO MD         CT 
ABDOMEN/PELVIS -16-42 23:49:00                                             
                                        [place] [address]      
Patient Name: MORENO PAINTING                                   MR #: [id number]
                    : 1940                                   Age/Sex: 
78/F  Acct #: [id number]                              Req #: [id number]  Adm 
Physician:                                                      Ordered by: 
RADHA SALGUERO MD                            Report #: [id number]        
Location: ER                                      Room/Bed:                     
________________________________________
___________________________________________________________    Procedure: 0717-0
036 CT/CT ABDOMEN/PELVIS W  Exam Date: 19                            Exam 
Time: 2325                                              REPORT STATUS: Signed   
EXAM:  CT of the abdomen and pelvis WITH contrast      HISTORY:  High blood pre
ssure, abdominal pain, vomiting   COMPARISON:  CT of the pelvis 2018.  
   TECHNIQUE:    The abdomen and pelvis were scanned utilizing a multidetector 
helical scanner.    Coronal and sagittal reformats are provided.               P
ROTOCOL:  Routine               IV CONTRAST:  100 cc of Isovue-370.             
 ORAL CONTRAST:  Water               RADIATION DOSE: Total DLP: 209.34 mGy*cm   
                     Estimated effective dose:  (DLP x 0.015 x size factor)   
Dose modulation, iterative reconstruction, and/or weight based adjustment of   t
he mA/kV was utilized to reduce the radiation dose to as low as reasonably   ach
ievable.               COMPLICATIONS: None      FINDINGS:   LOWER THORAX: Interv
al resolution of the pleural effusions and adjacent   atelectasis.      LIVER/BI
LIARY:  No masses.  The common bile duct remains prominent, 1 cm in   diameter. 
    GALLBLADDER: Cholelithiasis.   SPLEEN: Unremarkable   PANCREAS: Unremarkable
     ADRENALS: No nodules   KIDNEYS: Symmetric perfusion. No enhancing masses. 
No hydronephrosis.      GI TRACT:    Stable moderate hiatal hernia containing t
he proximal stomach.    Additional segmentals of small bowel wall thickening and
hyperemia, for example   the right lower quadrant coronal image 19   Persistent 
distal/ileal small bowel wall segmental thickening and hyperemia,   seen over m
ultiple priors. Multiple loops of fluid-filled more proximal small   bowel measu
ring up to 3.2 cm in diameter.      VESSELS:    Diffuse scattered atheroscleroti
c vascular calcifications.      PERITONEUM/RETROPERITONEUM: Slightly increased f
ree fluid within the pelvis. No   free air.   LYMPH NODES: Scattered prominent m
esenteric lymph nodes, measuring up to 1 cm   in short axis, likely reactive.   
  REPRODUCTIVE ORGANS/BLADDER: Hysterectomy. The urinary bladder is predominant
ly   decompressed.      SOFT TISSUES: Unremarkable   BONES: Stable hemangioma an
d bone islands within the L5 vertebral body.      IMPRESSION:   1.  Findings rem
ain compatible with multifocal enteritis, increased hyperemia,   bowel wall thic
kening and segmental narrowing since 2018. Crohn's disease   remains a favo
red diagnosis.  Findings compatible with associated partial   functional distal 
small bowel obstruction.    2.  Cholelithiasis with persistent prominence of the
common bile duct, but no   CT findings to suggest acute cholecystitis.      Sig
samantha by: BLAINE SantiagoO., M.M.M. on 2019 12:19 AM        Dictated By: 
ABIEL SHELBY DO  Electronically Signed By: ABIEL SHELBY DO on 19  Trans
cribed By: BILL on 19       COPY TO:   RADHA SALGUERO MD    
    Urine Wngxk4213-67-53 23:44:00* 



             Test Item    Value        Reference Range Interpretation Comments

 

             Urine Color (test code = 5778-6) YELLOW       YELLOW               

      





[facility] Rock[facility]rine Txsnubz3444-00-49 23:44:00* 



             Test Item    Value        Reference Range Interpretation Comments

 

             Urine Clarity (test code = 52722-4) CLOUDY       CLEAR        H    

         





[facility] RockUrine Specific Zexlcrw6426-73-78 23:44:00
  * 



             Test Item    Value        Reference Range Interpretation Comments

 

             Urine Specific Gravity (test code = 5811-5) 1.020        1.010-1.02

5                





[facility] RockUrine yT4228-82-82 23:44:00* 



             Test Item    Value        Reference Range Interpretation Comments

 

             Urine pH (test code = 67728-1) 6            5-7                    

    





[facility] RockUrine Leukocyte Lqkqtpqa7181-09-85 
23:44:00* 



             Test Item    Value        Reference Range Interpretation Comments

 

             Urine Leukocyte Esterase (test code = 28488-5) NEGATIVE     NEGATIV

E                   





[facility] RockUrine Nkyuxtz6331-62-73 23:44:00* 



             Test Item    Value        Reference Range Interpretation Comments

 

             Urine Nitrite (test code = 72393-8) NEGATIVE     NEGATIVE          

         





[facility] RockUrine Ykinith7975-43-58 23:44:00* 



             Test Item    Value        Reference Range Interpretation Comments

 

             Urine Protein (test code = 57735-3) NEGATIVE     NEGATIVE          

         





[facility] Rock[facility]rine Glucose (UA)2019 23:44:00* 



             Test Item    Value        Reference Range Interpretation Comments

 

             Urine Glucose (UA) (test code = 78744-8) NEGATIVE     NEGATIVE     

              





[facility] RockUrine Onyipeh4897-22-36 23:44:00* 



             Test Item    Value        Reference Range Interpretation Comments

 

             Urine Ketones (test code = 00087-1) NEGATIVE     NEGATIVE          

         





[facility] RockUrine Ninkxvevutrb5134-64-62 23:44:00* 



             Test Item    Value        Reference Range Interpretation Comments

 

             Urine Urobilinogen (test code = 36428-1) 0.2          0.2-1        

              





[facility] RockUrine Eyzuiwave0027-92-17 23:44:00* 



             Test Item    Value        Reference Range Interpretation Comments

 

             Urine Bilirubin (test code = 1977-8) NEGATIVE     NEGATIVE         

          





[facility] RockUrine Qhllx0868-30-78 23:44:00* 



             Test Item    Value        Reference Range Interpretation Comments

 

             Urine Blood (test code = 84553-7) 1+           NEGATIVE            

       





[facility] RockAmylase Iztvu3941-21-00 23:22:00* 



             Test Item    Value        Reference Range Interpretation Comments

 

             Amylase Level (test code = 1798-8) 60                        

        





[facility] RockLipase2019-07-17 23:22:00* 



             Test Item    Value        Reference Range Interpretation Comments

 

             Lipase (test code = 3040-3) 36           8-78                      

 





[facility] RockCreatine Kinase QY9436-42-91 23:21:00* 



             Test Item    Value        Reference Range Interpretation Comments

 

             Creatine Kinase MB (test code = 58314-6) 3.10         0-5.0        

              





[facility] RockTroponin -95-12 23:21:00* 



             Test Item    Value        Reference Range Interpretation Comments

 

             Troponin I (test code = OCR2073) 0.006        0-0.300              

      





[facility] RockCreatine Tessds8233-31-97 23:08:00* 



             Test Item    Value        Reference Range Interpretation Comments

 

             Creatine Kinase (test code = 2157-6) 50                      

          





[facility] RockCHEST 2 RMBRE3844-31-29 11:57:00         
                                                                            [facility]                        [address] [place] [address]      Patient Name: MORENO PAINTING          
                        MR #: [id number]                     : 1940    
                              Age/Sex: 78/F  Acct #: [id number]               
              Req #: [id number]  Adm Physician:                                 
                    Ordered by: BELEN ZHANG MD                           
Report #: [id number]        Location: ER                                      
Room/Bed:                     ___________________________________________
________________________________________________________    Procedure: [id number]
DX/CHEST 2 VIEWS  Exam Date: 19                            Exam Time: 1110
                                             REPORT STATUS: Signed    EXAM: 
EST 2 VIEWS, PA and lateral   DATE: 3/18/2019 Time stamp on exam: 10:31 AM   IND
ICATION: Cough   COMPARISON: None      FINDINGS:   LINES/TUBES: None      LUNGS:
No consolidations or edema.       PLEURA: No effusions or pneumothorax.      HE
ART AND MEDIASTINUM: Normal size and contour. Calcification within the aorta.   
  BONES AND SOFT TISSUES: No acute findings. Large air-containing middle   medi
astinal structure compatible with a hiatal hernia.       IMPRESSION:   No acute 
thoracic abnormality.                  Signed by: Dr. Thierry Nolen DO on 3/18/
2019 11:59 AM        Dictated By: THIERRY NOLEN DO  Electronically Signed By: JUNIOR NOLEN DO on 19 115  Transcribed By: BILL on 19 1159       
COPY TO:   BELEN ZHANG MD         Creatine Kinase GG8728-04-15 11:53:00* 



             Test Item    Value        Reference Range Interpretation Comments

 

             Creatine Kinase MB (test code = 56050-0) 0.80         0-5.0        

              





[facility] RockTroponin -25-98 11:53:00* 



             Test Item    Value        Reference Range Interpretation Comments

 

             Troponin I (test code = SOI5869) < 0.001      0-0.300              

      





[facility]landodium Kzlre9940-09-54 11:40:00* 



             Test Item    Value        Reference Range Interpretation Comments

 

             Sodium Level (test code = 2951-2) 144          136-145             

       





[facility] RockPotassium Ofduk8661-30-74 11:40:00* 



             Test Item    Value        Reference Range Interpretation Comments

 

             Potassium Level (test code = 2823-3) 4.0          3.5-5.1          

          





[facility] RockChloride Zemoh5147-28-61 11:40:00* 



             Test Item    Value        Reference Range Interpretation Comments

 

             Chloride Level (test code = 2075-0) 108                 H    

         





[facility] RockCarbon Dioxide Uncmp5508-93-99 11:40:00* 



             Test Item    Value        Reference Range Interpretation Comments

 

             Carbon Dioxide Level (test code = 2028-9) 27           22-29       

               





[facility] RockAnion Wyh3106-87-62 11:40:00* 



             Test Item    Value        Reference Range Interpretation Comments

 

             Anion Gap (test code = 33037-3) 13.0         8-16                  

     





[facility] RockBlood Urea Olyyuean8272-89-73 11:40:00* 



             Test Item    Value        Reference Range Interpretation Comments

 

             Blood Urea Nitrogen (test code = 3094-0) 8            7-26         

              





[facility] RockCreatinine2019-03-18 11:40:00* 



             Test Item    Value        Reference Range Interpretation Comments

 

             Creatinine (test code = 2160-0) 0.74         0.57-1.11             

     





[facility] RockBUN/Creatinine Ajrsw9572-17-29 11:40:00* 



             Test Item    Value        Reference Range Interpretation Comments

 

             BUN/Creatinine Ratio (test code = 3097-3) 11           6-25        

               





[facility] Round RockEstimat Glomerular Filtration Rate
2019 11:40:00* 



             Test Item    Value        Reference Range Interpretation Comments

 

             Estimat Glomerular Filtration Rate (test code = 779540353) > 60    

     >60                        





Ranges were taken from the National Kidney Disease Education Program and the [facility] Foundation literature.Reference ranges:60 or greater: Cmkyjz98-96 (
for 3 consecutive months): Chronic kidney disease 15 or less: Kidney failureBaylor Scott & White Medical Center – Round RockGlucose Crjga3204-98-55 11:40:00* 



             Test Item    Value        Reference Range Interpretation Comments

 

             Glucose Level (test code = ARG5500) 150                 H    

         





[facility] RockCalcium Kfxet2176-03-19 11:40:00* 



             Test Item    Value        Reference Range Interpretation Comments

 

             Calcium Level (test code = 11913-8) 8.4          8.4-10.2          

         





[facility] RockTotal Uyonhrixp0165-02-36 11:40:00* 



             Test Item    Value        Reference Range Interpretation Comments

 

             Total Bilirubin (test code = 1975-2) 0.3          0.2-1.2          

          





[facility] Round RockAspartate Amino Transf (AST/SGOT)
2019 11:40:00* 



             Test Item    Value        Reference Range Interpretation Comments

 

                                        Aspartate Amino Transf (AST/SGOT) (test 

code = Aspartate Amino Transf 

(AST/SGOT))     13              5-34                             





[facility] Round RockAlanine Aminotransferase (ALT/SGPT)
2019 11:40:00* 



             Test Item    Value        Reference Range Interpretation Comments

 

             Alanine Aminotransferase (ALT/SGPT) (test code = 1742-6) 6         

   0-55                       





[facility] Round RockTotal Mdofmmc4242-80-85 11:40:00* 



             Test Item    Value        Reference Range Interpretation Comments

 

             Total Protein (test code = 2885-2) 6.4          6.5-8.1      L     

        





[facility] Round RockAlbumin2019-03-18 11:40:00* 



             Test Item    Value        Reference Range Interpretation Comments

 

             Albumin (test code = 1751-7) 3.2          3.5-5.0      L           

  





[facility] Round RockGlobulin2019-03-18 11:40:00* 



             Test Item    Value        Reference Range Interpretation Comments

 

             Globulin (test code = 65438-0) 3.2          2.3-3.5                

    





[facility] RockAlbumin/Globulin Ufhyn6852-08-39 11:40:00
  * 



             Test Item    Value        Reference Range Interpretation Comments

 

             Albumin/Globulin Ratio (test code = 1759-0) 1.0          0.8-2.0   

                 





[facility] Round RockAlkaline Rebbnlbchek9108-26-65 11:40:00* 



             Test Item    Value        Reference Range Interpretation Comments

 

             Alkaline Phosphatase (test code = 6768-6) 45                 

               





[facility] RockCreatine Hykxiw4777-21-23 11:40:00* 



             Test Item    Value        Reference Range Interpretation Comments

 

             Creatine Kinase (test code = 2157-6) 34                      

          





[facility] RockWhite Blood Vpwru7486-21-64 11:23:00* 



             Test Item    Value        Reference Range Interpretation Comments

 

             White Blood Count (test code = 6690-2) 3.48         4.8-10.8     L 

            





[facility] Round RockRed Blood Yvbox6481-54-34 11:23:00* 



             Test Item    Value        Reference Range Interpretation Comments

 

             Red Blood Count (test code = 789-8) 3.96         3.6-5.1           

         





[facility] Round RockHemoglobin2019-03-18 11:23:00* 



             Test Item    Value        Reference Range Interpretation Comments

 

             Hemoglobin (test code = 08490-5) 11.4         12.0-16.0    L       

      





[facility] Round RockHematocrit2019-03-18 11:23:00* 



             Test Item    Value        Reference Range Interpretation Comments

 

             Hematocrit (test code = 4544-3) 37.0         34.2-44.1             

     





[facility] Round RockMean Corpuscular Zexxgh3828-39-78 
11:23:00* 



             Test Item    Value        Reference Range Interpretation Comments

 

             Mean Corpuscular Volume (test code = 787-2) 93.4         81-99     

                 





[facility] RockMean Corpuscular Lplzrjdwcd4622-23-35 
11:23:00* 



             Test Item    Value        Reference Range Interpretation Comments

 

             Mean Corpuscular Hemoglobin (test code = 785-6) 28.8         28-32 

                     





[facility] Round RockMean Corpuscular Hemoglobin Concent
2019 11:23:00* 



             Test Item    Value        Reference Range Interpretation Comments

 

             Mean Corpuscular Hemoglobin Concent (test code = 786-4) 30.8       

  31-35        L             





[facility] Round RockRed Cell Distribution Dshme4388-74-93 
11:23:00* 



             Test Item    Value        Reference Range Interpretation Comments

 

             Red Cell Distribution Width (test code = 58360-3) 16.7         11.7

-14.4    H             





[facility] Round RockPlatelet Dyclj3196-00-53 11:23:00* 



             Test Item    Value        Reference Range Interpretation Comments

 

             Platelet Count (test code = 777-3) 244          140-360            

        





[facility] Round RockNeutrophils (%) (Auto)2019 11:23:00
  * 



             Test Item    Value        Reference Range Interpretation Comments

 

             Neutrophils (%) (Auto) (test code = 32495-0) 67.2         38.7-80.0

                  





[facility] Round RockLymphocytes (%) (Auto)2019 11:23:00
  * 



             Test Item    Value        Reference Range Interpretation Comments

 

             Lymphocytes (%) (Auto) (test code = 736-9) 20.7         18.0-39.1  

                





[facility] RockMonocytes (%) (Auto)2019 11:23:00* 



             Test Item    Value        Reference Range Interpretation Comments

 

             Monocytes (%) (Auto) (test code = 5905-5) 10.3         4.4-11.3    

               





[facility] RockEosinophils (%) (Auto)2019 11:23:00
  * 



             Test Item    Value        Reference Range Interpretation Comments

 

             Eosinophils (%) (Auto) (test code = 713-8) 0.9          0.0-6.0    

                





[facility] RockBasophils (%) (Auto)2019 11:23:00* 



             Test Item    Value        Reference Range Interpretation Comments

 

             Basophils (%) (Auto) (test code = 706-2) 0.6          0.0-1.0      

              





[facility] Round RockIM GRANULOCYTES %2019 11:23:00* 



             Test Item    Value        Reference Range Interpretation Comments

 

             IM GRANULOCYTES % (test code = IM GRANULOCYTES %) 0.3          0.0-

1.0                    





[facility] RockNeutrophils # (Auto)2019 11:23:00* 



             Test Item    Value        Reference Range Interpretation Comments

 

             Neutrophils # (Auto) (test code = 751-8) 2.3          2.1-6.9      

              





[facility] Round RockLymphocytes # (Auto)2019 11:23:00* 



             Test Item    Value        Reference Range Interpretation Comments

 

             Lymphocytes # (Auto) (test code = 54510-4) 0.7          1.0-3.2    

  L             





[facility] RockMonocytes # (Auto)2019 11:23:00* 



             Test Item    Value        Reference Range Interpretation Comments

 

             Monocytes # (Auto) (test code = 742-7) 0.4          0.2-0.8        

            





[facility] RockEosinophils # (Auto)2019 11:23:00* 



             Test Item    Value        Reference Range Interpretation Comments

 

             Eosinophils # (Auto) (test code = 711-2) 0.0          0.0-0.4      

              





[facility] RockBasophils # (Auto)2019 11:23:00* 



             Test Item    Value        Reference Range Interpretation Comments

 

             Basophils # (Auto) (test code = 704-7) 0.0          0.0-0.1        

            





[facility] RockAbsolute Immature Granulocyte (auto
2019 11:23:00* 



             Test Item    Value        Reference Range Interpretation Comments

 

                                        Absolute Immature Granulocyte (auto (teri

t code = Absolute Immature Granulocyte 

(auto)          0.01            0-0.1                            





[facility] RockInfluenza Virus Types A,B Antigen
2019 11:02:00* 



             Test Item    Value        Reference Range Interpretation Comments

 

             Influenza Virus Types A,B Antigen (test code = 95521-9) NEGATIVE   

  NEGATIVE                   





[facility] Round RockInfluenza Virus Types A,B Antigen
2019 11:02:00* 



             Test Item    Value        Reference Range Interpretation Comments

 

             Influenza Virus Types A,B Antigen (test code = 31114-6) NEGATIVE   

  NEGATIVE                   





[facility] Round RockInfluenza Virus Types A,B Antigen
2019 11:02:00* 



             Test Item    Value        Reference Range Interpretation Comments

 

             Influenza Virus Types A,B Antigen (test code = 94533-5) NEGATIVE   

  NEGATIVE                   





[facility]- XR CHEST 2 -86-07 09:17:00 FAX:   
     Saeed Cintron [phone]    [place]: [place]  St: REG FAX: Desiree Horner [phone]   ----------------------------------------
---------------------------------------  Name:   MORENO PAINTING                
 [facility] FS[facility]                 : 1940  Age/S: 78/F           [address] 
[facility]     Unit #: [id number]      Loc: [facility]       [address]    
                      Phys: Saeed Cintron MD              [place], [place] [address]              Acct: [id number] Dis Date:               Status: REG ER     
                           PHONE #:                  Exam Date:     2019                   FAX #:                  Reason: COUGH/FEVER            
                           EXAMS:                                               
CPT CODE:      [id number] XR CHEST 2 V                               86488       
            HISTORY: Cough and fever.               COMPARISON: 2019.               AP and lateral view of the chest:               No acute inf
iltrates, effusion or congestion.  Cardiac silhouette is       mildly enlarged. 
DJD of the dorsal spine.                 IMPRESSION:                   No acute 
infiltrates, effusion or congestion.          ** Electronically Signed by GUSTAVO Chavez on 2019 at 0917 **                      Reported and signed b
y: Darrell Chavez M.D.                           CC: Saeed Cintron MD; Desiree Castillo MD                                                                  
                           Technologist: RISSA NUNEZ RT(R)(CT)                 
               Trnscrd Date/Time/By: 2019 (917) : By: [email or web address]          
Orig Print D/T: S: 2019 (920)                         PAGE  1            
          Signed Report                               - XR CHEST 2 -22-09 
18:51:00 FAX:         Lino Moran MD   [phone]    [place]: [place]  St: 
PRE----------
---------------------------------------------------------------------  Name:   MORENO CUNNINGHAM                  [facility]                 : [birth year]  Age/S: 78/F           [address] [facility] [address]     Unit #: [id number]    
 [facility]       [address]                           Phys: Lino Moran MD   
            [place], [place] [address]              Acct: [id number] Dis Date:      
        Status: PRE ER                                 PHONE #:                 
Exam Date:     2019     1845                   FAX #:                  
Reason: COUGH                                              EXAMS:               
                               CPT CODE:      [id number] XR CHEST 2 V            
                  [address]                    HISTORY: COUGH               TECHN
IQUE: AP chest x-ray               COMPARISON: 16               FINDINGS: 
             No airspace consolidation or pleural effusion. Normal heart size.  
    Mediastinal silhouette is unremarkable. Thoracic spondylosis.               
 IMPRESSION:                   No radiographic evidence of acute cardiopulmonary
process.          ** Electronically Signed by Marylou Burkett D.O. on 2019 at 
1851 **                      Reported and signed by: Marylou Burkett D.O.             
            CC: Lino Moran MD                                              
                                                                       Techn
ologist: Iza Astorga                                          Trnscrd Date/Crow
e/By: 2019 () : By: [email or web address]          Orig Print D/T: S: 2019 
()                         PAGE  1                       Signed Report      
                        TB Test (T-Spot)2018 13:22:00* 



             Test Item    Value        Reference Range Interpretation Comments

 

                          TB Test (T-Spot) (test code = TB Test (T-Spot)) The te

st result is Negative 

because the spot count in (Panel  A minus Nil control) and (Panel B minus Nil 
Control) is less  than or equal to 4. This includes values less than zero.  
Note: Diagnosing or excluding tuberculosis disease, and  assessing the 
probability of LTBI, requires a combination of  epidemiological, historical, 
medical and diagnostic findings  that should be taken into account when in
terpreting  T-SPOT.TB test results. Refer to the most recent CDC  guidance for 
detailed recommendations on diagnosing TB  infection and selecting persons for 
testing. Guidelines set  forth by the Centers of Disease Control and Prevention 
recommend contacts of a person with TB disease who have a  negative initial 
interferon-gamma release assay (IGRA) or  TST within 8 weeks of exposure be 
retested 8-10 weeks after  last exposure.  Nil (Neg) Control Spot Count 0 Panel 
A Spot Count           0 Panel B Spot Count           0 Positive Control Spot 
Count  >20  Testing performed by: [facility] [address] [place] [address] [phone] Dir: Rusty Chiang MD     

                        

                                         





[facility] RockTissue Transglutaminase IgA Uv1730-14-25 
06:19:00* 



             Test Item    Value        Reference Range Interpretation Comments

 

             Tissue Transglutaminase IgA Ab (test code = 55316-0) <2           0

-3                        





                              Negative        0 -  3                            
 Weak Positive   4 - 10                              Positive           >10 
Tissue Transglutaminase (tTG) has been identified as the endomysial antigen.  
Studies have demonstr- ated that endomysial IgA antibodies have over 99% specif
icity for gluten sensitive enteropathy.[facility]
Immunoglobulin -04-72 06:19:00* 



             Test Item    Value        Reference Range Interpretation Comments

 

             Immunoglobulin A (test code = 2458-8) 271                    

           





Performed at:   - [facility]  [id number]
: Ruddy Stout MD, Phone:  [phone] at:  [address]  [id number] Director: Ha Mcgregor MD, 
Phone:  [phone] [facility] & [facility] Round RockEndomysial IgA Antibody
2018 06:19:00* 



             Test Item    Value        Reference Range Interpretation Comments

 

             Endomysial IgA Antibody (test code = 27958-3) Negative     Negative

                   





[facility] RockHepatitis B Surface Lzxufaq1845-56-57 
08:31:00* 



             Test Item    Value        Reference Range Interpretation Comments

 

             Hepatitis B Surface Antigen (test code = 5196-1) Negative     Negat

nilay                   





Performed at:  [address]  [id number]
Director: Ha Mcgregor MD, Phone:  [phone] Scott & [facility] Round RockHepatitis B Surface Uswfngn9876-23-50 08:31:00* 



             Test Item    Value        Reference Range Interpretation Comments

 

             Hepatitis B Surface Antigen (test code = 5196-1) Negative     Negat

nilay                   





Performed at:  [address]  [id number]
Director: Ha Mcgregor MD, Phone:  [phone] Scott & [facility] Center – Round RockC-Reactive Eessdri3115-72-85 06:33:00* 



             Test Item    Value        Reference Range Interpretation Comments

 

             C-Reactive Protein (test code = -) 24.2         0.0-4.9      H

             





Performed at:  [address]  [id number]
Director: Ha Mcgregor MD, Phone:  [phone] Scott & [facility] Medical Center – Round RockC-Reactive Trkiuid5344-92-27 06:33:00* 



             Test Item    Value        Reference Range Interpretation Comments

 

             C-Reactive Protein (test code = -) 24.2         0.0-4.9      H

             





Performed at:  [address]  [id number]
Director: Ha Mcgregor MD, Phone:  [phone] Scott & [facility] Center – Round RockErythrocyte Sedimentation Otow6098-04-84 06:03:00* 



             Test Item    Value        Reference Range Interpretation Comments

 

             Erythrocyte Sedimentation Rate (test code = 4537-7) 23           0-

20         H             





[facility] RockErythrocyte Sedimentation Zxwn0181-25-37 
06:03:00* 



             Test Item    Value        Reference Range Interpretation Comments

 

             Erythrocyte Sedimentation Rate (test code = 4537-7) 23           0-

20         H             





[facility] RockCTA ABD/QVWWGR0285-34-90 04:47:00    [facility]   [address] [address]      Patient Name: MORENO PAINTING   MR #: [id number]    : 
1940 Age/Sex: 77/F  Acct #: [id number] Req #: [id number]  Adm Physician:
JOSH ALLISON MD    Ordered by: VICKY SHUKLA MD  Report #: [id number]   Location: M
ED/SURG2  Room/Bed: [facility]    ___________________________________________________
________________________________________________    Procedure: [id number] CT/CTA 
ABD/PELVIS  Exam Date: 18                            Exam Time: 0350      
REPORT STATUS: Signed       EXAM: CTA ABD/PELVIS   DATE: 2018 2:52 AM     
INDICATION:   S CT Angio of ABD/Pelvis r/o mesenteric ischemia  S 2018      
COMPARISON:  18   TECHNIQUE: The abdomen and pelvis were scanned using a mu
ltidetector helical   scanner. Coronal and sagittal reformations were obtained. 
CT angiogram   mesenteric ischemia protocol was performed.   IV Contrast: 100 ml
Isovue 300/370      FINDINGS:   Evaluation of abdominal pelvic organs is somewh
at degraded due to arterial   phase imaging.   LOWER THORAX: Increased small eff
usions with associated atelectasis.      LIVER/BILIARY:  No masses.  No ductal d
ilatation.      GALLBLADDER: Cholelithiasis.   SPLEEN: Unremarkable   PANCREAS: 
Unremarkable      ADRENALS: No nodules   KIDNEYS: Symmetric perfusion. No enhanc
ing masses. No hydronephrosis.      GI TRACT: Stable moderate hiatal hernia. Per
sistent distal/ileal small bowel   wall segmental thickening and hyperemia, seen
over multiple priors. Interval   improvement in small bowel dilatation.      VE
SSELS: Aorta, iliac and visualized femoral arteries are patent, normal   caliber
. Moderate calcified and noncalcified atherosclerotic plaque of the   abdominal 
aorta and common iliac arteries, mild of the external and internal   iliac arter
ies. Celiac axis, SMA, bilateral renal arteries, and SHIV are patent,   normal ca
liber.   PERITONEUM/RETROPERITONEUM: Mild abdominal and mesenteric edema/free fl
uid,   decreased from prior. No free air.   LYMPH NODES: Scattered prominent mes
enteric lymph nodes, likely reactive.      REPRODUCTIVE ORGANS/BLADDER: Hysterec
oma. Otherwise unremarkable.      SOFT TISSUES: Unremarkable   BONES: No suspic
ious bone lesions.      IMPRESSION:   1. Enteritis, similar in appearance and lo
cation over multiple priors from   .  Favor inflammatory etiology such as Cr
ohn's.  Improved small bowel   dilation and free fluid/edema from most recent pr
ior.   2. Central mesenteric arteries are patent, normal caliber.      Signed by
: Dr Tejas Perez MD on 2018 5:02 AM        Dictated By: TEJAS PEREZ MD  Electronically Signed By: TEJAS PEREZ MD on 18 050  Transcribed B
y: BILL on 18       COPY TO:   VICKY SHUKLA MD        Sodium Level
2018 06:00:00* 



             Test Item    Value        Reference Range Interpretation Comments

 

             Sodium Level (test code = 2951-2) 136          136-145             

       





[facility] RockPotassium Wpgxh0555-10-71 06:00:00* 



             Test Item    Value        Reference Range Interpretation Comments

 

             Potassium Level (test code = 2823-3) 4.4          3.5-5.1          

          





[facility] RockChloride Xbvak2176-33-12 06:00:00* 



             Test Item    Value        Reference Range Interpretation Comments

 

             Chloride Level (test code = 2075-0) 108                 H    

         





[facility] RockCarbon Dioxide Hrcod2575-00-18 06:00:00* 



             Test Item    Value        Reference Range Interpretation Comments

 

             Carbon Dioxide Level (test code = 2028-9) 21           22-29       

 L             





[facility] Round RockAnion Fjm3493-27-89 06:00:00* 



             Test Item    Value        Reference Range Interpretation Comments

 

             Anion Gap (test code = 33037-3) 11.4         8-16                  

     





[facility] RockBlood Urea Okrtckrg4596-29-79 06:00:00* 



             Test Item    Value        Reference Range Interpretation Comments

 

             Blood Urea Nitrogen (test code = 3094-0) 9            7-26         

              





[facility] Round RockCreatinine2018-07-13 06:00:00* 



             Test Item    Value        Reference Range Interpretation Comments

 

             Creatinine (test code = 2160-0) 0.67         0.57-1.11             

     





[facility] Round RockBUN/Creatinine Unwtc7439-28-19 06:00:00* 



             Test Item    Value        Reference Range Interpretation Comments

 

             BUN/Creatinine Ratio (test code = 3097-3) 13           6-25        

               





[facility] Round RockEstimat Glomerular Filtration Rate
2018 06:00:00* 



             Test Item    Value        Reference Range Interpretation Comments

 

             Estimat Glomerular Filtration Rate (test code = 86453-4) 60-       

   >60                        





Ranges were taken from the National Kidney Disease Education Program and the [facility] Foundation literature.Reference ranges:60 or greater: Golxxa09-64 (
for 3 consecutive months): Chronic kidney disease 15 or less: Kidney failureBaylor Scott & White Medical Center – Round RockGlucose Wfaib2577-13-01 06:00:00* 



             Test Item    Value        Reference Range Interpretation Comments

 

             Glucose Level (test code = KTE2700) 181                 H    

         





[facility] RockCalcium Yuhhp1559-13-81 06:00:00* 



             Test Item    Value        Reference Range Interpretation Comments

 

             Calcium Level (test code = 98804-4) 7.8          8.4-10.2     L    

         





[facility] Round RockTotal Nvnxpjitw4750-72-63 06:00:00* 



             Test Item    Value        Reference Range Interpretation Comments

 

             Total Bilirubin (test code = 1975-2) 0.6          0.2-1.2          

          





[facility] Round RockAspartate Amino Transf (AST/SGOT)
2018 06:00:00* 



             Test Item    Value        Reference Range Interpretation Comments

 

                                        Aspartate Amino Transf (AST/SGOT) (test 

code = Aspartate Amino Transf 

(AST/SGOT))     10              5-34                             





[facility] Round RockAlanine Aminotransferase (ALT/SGPT)
2018 06:00:00* 



             Test Item    Value        Reference Range Interpretation Comments

 

             Alanine Aminotransferase (ALT/SGPT) (test code = 1742-6) -6        

   0-55                       





[facility] Round RockTotal Fooijgy0445-20-40 06:00:00* 



             Test Item    Value        Reference Range Interpretation Comments

 

             Total Protein (test code = 2885-2) 5.3          6.5-8.1      L     

        





[facility] Round RockAlbumin2018-07-13 06:00:00* 



             Test Item    Value        Reference Range Interpretation Comments

 

             Albumin (test code = 1751-7) 2.6          3.5-5.0      L           

  





[facility] Round RockGlobulin2018-07-13 06:00:00* 



             Test Item    Value        Reference Range Interpretation Comments

 

             Globulin (test code = 13031-5) 2.7          2.3-3.5                

    





[facility] Round RockAlbumin/Globulin Qquzy1821-55-25 06:00:00
  * 



             Test Item    Value        Reference Range Interpretation Comments

 

             Albumin/Globulin Ratio (test code = 1759-0) 1.0          0.8-2.0   

                 





[facility] Round RockAlkaline Mpuiwmceyta9382-20-01 06:00:00* 



             Test Item    Value        Reference Range Interpretation Comments

 

             Alkaline Phosphatase (test code = 6768-6) 54                 

               





[facility] Round RockWhite Blood Indxu8905-93-34 05:48:00* 



             Test Item    Value        Reference Range Interpretation Comments

 

             White Blood Count (test code = 6690-2) 8.50         4.8-10.8       

            





[facility] Round RockRed Blood Wrfki1590-56-25 05:48:00* 



             Test Item    Value        Reference Range Interpretation Comments

 

             Red Blood Count (test code = 789-8) 3.68         3.6-5.1           

         





[facility] Round RockHemoglobin2018-07-13 05:48:00* 



             Test Item    Value        Reference Range Interpretation Comments

 

             Hemoglobin (test code = 10442-2) 10.2         12.0-16.0    L       

      





[facility] Round RockHematocrit2018-07-13 05:48:00* 



             Test Item    Value        Reference Range Interpretation Comments

 

             Hematocrit (test code = 4544-3) 32.4         34.2-44.1    L        

     





[facility] Round RockMean Corpuscular Tezumw3394-29-83 
05:48:00* 



             Test Item    Value        Reference Range Interpretation Comments

 

             Mean Corpuscular Volume (test code = 787-2) 88.0         81-99     

                 





[facility] Round RockMean Corpuscular Ykklccxych8054-40-42 
05:48:00* 



             Test Item    Value        Reference Range Interpretation Comments

 

             Mean Corpuscular Hemoglobin (test code = 785-6) 27.7         28-32 

       L             





[facility] Round RockMean Corpuscular Hemoglobin Concent
2018 05:48:00* 



             Test Item    Value        Reference Range Interpretation Comments

 

             Mean Corpuscular Hemoglobin Concent (test code = 786-4) 31.5       

  31-35                      





[facility] Round RockRed Cell Distribution Kjirh8566-16-13 
05:48:00* 



             Test Item    Value        Reference Range Interpretation Comments

 

             Red Cell Distribution Width (test code = 96297-8) 17.2         11.7

-14.4    H             





[facility] Round RockPlatelet Phpss0408-41-27 05:48:00* 



             Test Item    Value        Reference Range Interpretation Comments

 

             Platelet Count (test code = 777-3) 249          140-360            

        





[facility] Round RockNeutrophils (%) (Auto)2018 05:48:00
  * 



             Test Item    Value        Reference Range Interpretation Comments

 

             Neutrophils (%) (Auto) (test code = 53156-5) 90.9         38.7-80.0

    H             





[facility] Round RockLymphocytes (%) (Auto)2018 05:48:00
  * 



             Test Item    Value        Reference Range Interpretation Comments

 

             Lymphocytes (%) (Auto) (test code = 736-9) 5.5          18.0-39.1  

  L             





[facility] RockMonocytes (%) (Auto)2018 05:48:00* 



             Test Item    Value        Reference Range Interpretation Comments

 

             Monocytes (%) (Auto) (test code = 5905-5) 3.3          4.4-11.3    

 L             





[facility] RockEosinophils (%) (Auto)2018 05:48:00
  * 



             Test Item    Value        Reference Range Interpretation Comments

 

             Eosinophils (%) (Auto) (test code = 713-8) 0.0          0.0-6.0    

                





[facility] Round RockBasophils (%) (Auto)2018 05:48:00* 



             Test Item    Value        Reference Range Interpretation Comments

 

             Basophils (%) (Auto) (test code = 706-2) 0.1          0.0-1.0      

              





[facility] Round RockIM GRANULOCYTES %2018 05:48:00* 



             Test Item    Value        Reference Range Interpretation Comments

 

             IM GRANULOCYTES % (test code = IM GRANULOCYTES %) 0.2          0.0-

1.0                    





[facility] Round RockNeutrophils # (Auto)2018 05:48:00* 



             Test Item    Value        Reference Range Interpretation Comments

 

             Neutrophils # (Auto) (test code = 751-8) 7.7          2.1-6.9      

H             





[facility] Round RockLymphocytes # (Auto)2018 05:48:00* 



             Test Item    Value        Reference Range Interpretation Comments

 

             Lymphocytes # (Auto) (test code = 05148-6) 0.5          1.0-3.2    

  L             





[facility] Round RockMonocytes # (Auto)2018 05:48:00* 



             Test Item    Value        Reference Range Interpretation Comments

 

             Monocytes # (Auto) (test code = 742-7) 0.3          0.2-0.8        

            





[facility] RockEosinophils # (Auto)2018 05:48:00* 



             Test Item    Value        Reference Range Interpretation Comments

 

             Eosinophils # (Auto) (test code = 711-2) 0.0          0.0-0.4      

              





[facility] RockBasophils # (Auto)2018 05:48:00* 



             Test Item    Value        Reference Range Interpretation Comments

 

             Basophils # (Auto) (test code = 704-7) 0.0          0.0-0.1        

            





[facility] RockAbsolute Immature Granulocyte (auto
2018 05:48:00* 



             Test Item    Value        Reference Range Interpretation Comments

 

                                        Absolute Immature Granulocyte (auto (teri

t code = Absolute Immature Granulocyte 

(auto)          0.02            0-0.1                            





[facility] RockCT ABDOMEN/PELVIS -04-54 13:11:00   
[facility]   [address] [place] [address]      Patient Name: MORENO PAINTING   MR #: [id number]    : 
1940 Age/Sex: 77/F  Acct #: [id number] Req #: [id number]  Adm Physician:
    Ordered by: BOB LEON MD  Report #: [id number]   Location: ER  Room
/Bed:     ______________________________________________________________________
_____________________________    Procedure:  CT/CT ABDOMEN/PELVIS W  Ex
am Date: 18                            Exam Time: 1230       REPORT STATUS
: Signed    PROCEDURE: CT ABDOMEN AND PELVIS WITH CONTRAST       TECHNIQUE:    T
he abdomen and pelvis were scanned utilizing a multidetector helical    scanner 
from the diaphragm to the lesser trochanter after the IV    administration of 10
0 cc of Isovue 370 and the oral administration of    dilute Gastrografin.  Coron
al and sagittal multiplanar reformations    were obtained.       COMPARISON: Pat
[facility], CT, CT ABDOMEN/PELVIS W,    2016, 11:37.  Patients [facility], CT, CT ABDOMEN/PELVIS W,    1/10/2018, 5:09.  Patients [facility], CT, CT ABDOMEN/PELVIS W,    2018, 13:48.       INDICATIONS:   ABDOMINA
L PAIN. R/O OBSTRUCTION       FINDINGS:   LOWER THORAX: Linear atelectatic gamboa
es in the posterior right lower    lobe (series 2, image 11). Stable moderate hi
atal hernia. Mild    cardiomegaly.       HEPATOBILIARY: No focal hepatic lesions
. Decreased prominence of the    central intrahepatic bile ducts. Stable mild di
lation of the common    bile duct, which measures approximately 7 mm at the port
a hepatis.    Stable gallstones, with the largest measuring approximate 7 mm in 
the    dependent portion of the gallbladder lumen. No wall thickening.   SPLEEN:
No splenomegaly.   PANCREAS: No focal masses or ductal dilatation. Stable duode
nal    diverticulum between the second portion of the duodenum and pancreatic   
head (series 2 image 33).       ADRENALS: No adrenal nodules.   KIDNEYS/URETERS:
No hydronephrosis, stones, or solid mass lesions.   PELVIC ORGANS/BLADDER: Zelalem
dder is moderately distended, without focal    lesions. Uterus is not visualized
. No adnexal masses.       PERITONEUM / RETROPERITONEUM: Small amount of free fl
uid in the    posterior pelvis (series 2, image 71). No free air.   LYMPH NODES:
Multiple enlarged lymph nodes are again noted in the    central mesentery, which
measure 1.1 cm in short axis (series 2, images    51, 53). There are multiple 
prominent mesenteric lymph nodes which    measure approximately 8-9 mm, similar 
to prior exam, particularly along    the ileocolic vessel distribution.   No ret
roperitoneal, pelvic, or inguinal adenopathy.   VESSELS: Moderate calcification 
of the aorta and iliac vessels. Celiac    trunk, superior and inferior mesenteri
c, and bilateral renal arteries    are patent. Portal, superior mesenteric, and 
splenic veins are patent.       GI TRACT: Multiple loops of moderately dilated s
mall bowel involving    predominantly the ileum are noted in the anterior perito
neum, with a    maximal measurement of approximately 3.2 cm (for example series 
2,    images 41 and 53).   Several loops of distal ileum show moderate wall thic
kening, prominent    wall enhancement and increased vascularity/prominence of th
e vasa    recta, extending to the terminal ileum (for example, series 2, images 
  65, 70 and sagittal image 47 and coronal image    35).    No obstructing mass.
  Jejunum, stomach, and duodenum are unremarkable.   Large bowel is mostly dec
ompressed and unremarkable.        BONES AND SOFT TISSUES: No aggressive lytic l
esions. Soft tissues are    grossly unremarkable..       IMPRESSION:       1. Fi
ndings consistent with enteritis involving the ilium and extending    to the ter
justus ileum, similar in appearance to prior exams dated    2018, 2016 a
nd 1/10/2018. Given the recurrent nature of the    findings, inflammatory bowel 
disease is a primary consideration    (particularly Crohn's disease) despite the
patient's age. Infectious    enteritis is less likely. No evidence of perforati
on or adjacent    abscess formation.   2. Multiple loops of moderately dilated s
mall bowel, without a definite    transition point, likely representing a reacti
ve ileus.   3. Prominent mesenteric nodes, likely reactive.   4. Small amount of
free fluid in the pelvis, likely reactive.   5. Stable cholelithiasis and mild 
dilation of the common bile duct. No    CT evidence of cholecystitis.   6. Stabl
e moderate hiatal hernia.            Maxwell Robb M.D.     Dictated by:  THOMAS Robb M.D. on 2018 at 13:11        Electronically approved by: 
Maxwell Robb M.D. on 2018 at    13:11                Dictated By: SABINE ROBB MD  Electronically Signed By: MAXWELL ROBB MD on 18 1311  T
ranscribed By: ROBERTO on 18 1311       COPY TO:   BOB LEON MD     
  ABDOMEN ACUTE SERIES W/PA IKV9851-16-69 10:08:00    [place] [address]      Patient 
Name: MORENO PAINTING   MR #: [id number]    : 1940 Age/Sex: 77/F  Acct 
#: [id number] Req #: [id number]  Adm Physician:     Ordered by: BOB LEON MD  Report #: [id number]   Location: ER  Room/Bed:     
______________________________________________________________________
_____________________________    Procedure: [id number] DX/ABDOMEN ACUTE SERIES W/
PA CXR  Exam Date: 18                            Exam Time: 915       REP
ORT STATUS: Signed    PROCEDURE:   X-RAY ABDOMEN, ACUTE SERIES       COMPARISON:
  CT abdomen/pelvis 18.       INDICATIONS:   EPIGASTRIC PAIN       FINDING
S: There are dilated small bowel loops which measure up to 3.7    cm. There is a
ir within nondilated large bowel loops. Multiple    air-fluid levels are seen on
the upright radiograph. Findings are    consistent with partial small bowel obs
truction. No evidence of free    air.       Calcifications in the right lower qu
adrant represent calcified lymph    nodes as seen on prior CT. Single AP radiogr
aph of the chest    demonstrates clear lungs and unremarkable cardiomediastinal 
silhouette.    Atherosclerotic calcifications are present in the aortic arch. No
acute    bony findings.       CONCLUSION:      Findings consistent with partial 
small bowel obstruction. No evidence    of free air.        Clear lungs.       
Dictated by:  KORIN CONDE M.D. on 2018 at 10:08        Electronically appro
nilay by:  KORIN CONDE M.D. on 2018 at 10:08                Dictated By: QUINN CONDE MD  Electronically Signed By: KORIN CONDE MD on 18 1008  Transcribed
By: ROBERTO on 18 1008       COPY TO:   BOB LEON MD        Uric 
Heah2566-57-44 10:05:00* 



             Test Item    Value        Reference Range Interpretation Comments

 

             Uric Acid (test code = 3084-1) 5.0          2.6-8.0                

    





[facility] Lnga7812-69-63 10:05:00* 



             Test Item    Value        Reference Range Interpretation Comments

 

             Uric Acid (test code = 3084-1) 5.0          2.6-8.0                

    





[facility]2018-07-[facility] 09:56:00* 



             Test Item    Value        Reference Range Interpretation Comments

 

             Lipase (test code = 3040-3) 10           8-78                      

 





[facility] Rock[facility]2018-07-12 09:56:00* 



             Test Item    Value        Reference Range Interpretation Comments

 

             Lipase (test code = 3040-3) 10           8-78                      

 





[facility] RockLactic Acid Bxigw9055-58-17 09:47:00* 



             Test Item    Value        Reference Range Interpretation Comments

 

             Lactic Acid Level (test code = Lactic Acid Level) 19.8         4.5-

19.8                   





[facility] RockL[facility] Acid Imciq2625-11-44 09:47:00* 



             Test Item    Value        Reference Range Interpretation Comments

 

             Lactic Acid Level (test code = Lactic Acid Level) 19.8         4.5-

19.8                   





[facility] Rock[facility] LRT3085-46-04 09:37:00* 



             Test Item    Value        Reference Range Interpretation Comments

 

             Urine WBC (test code = 5821-4) NONE         0-5                    

    





[facility] RockU[facility] DMT1948-90-70 09:37:00* 



             Test Item    Value        Reference Range Interpretation Comments

 

             Urine RBC (test code = 29992-3) NONE         0-5                   

     





[facility] Rock[facility] Nlkcoejt0685-74-65 09:37:00* 



             Test Item    Value        Reference Range Interpretation Comments

 

             Urine Bacteria (test code = 22465-6) NONE         NONE             

          





[facility] Rock[facility] Epithelial Cueuw1101-60-34 09:37:00
  * 



             Test Item    Value        Reference Range Interpretation Comments

 

             Urine Epithelial Cells (test code = 53747-6) RARE         NONE     

                  





[facility] Rock[facility] Uric Acid Smbcmiag6520-18-19 
09:37:00* 



             Test Item    Value        Reference Range Interpretation Comments

 

             Urine Uric Acid Crystals (test code = 5817-2) MODERATE     FEW     

     H             





[facility] Rock[facility] PFV5492-18-79 09:37:00* 



             Test Item    Value        Reference Range Interpretation Comments

 

             Urine WBC (test code = 5821-4) NONE         0-5                    

    





[facility] DVP4547-89-59 09:37:00* 



             Test Item    Value        Reference Range Interpretation Comments

 

             Urine RBC (test code = 92503-2) NONE         0-5                   

     





[facility] Rock[facility] Wupmrhlu7768-68-69 09:37:00* 



             Test Item    Value        Reference Range Interpretation Comments

 

             Urine Bacteria (test code = 86147-7) NONE         NONE             

          





[facility] Rock[facility] Epithelial Tupub1264-70-53 09:37:00
  * 



             Test Item    Value        Reference Range Interpretation Comments

 

             Urine Epithelial Cells (test code = 23657-8) RARE         NONE     

                  





[facility] Uric Acid Qyxzgbrc3004-17-10 
09:37:00* 



             Test Item    Value        Reference Range Interpretation Comments

 

             Urine Uric Acid Crystals (test code = 5817-2) MODERATE     FEW     

     H             





[facility] Rock[facility] Okngu2451-59-93 09:18:00* 



             Test Item    Value        Reference Range Interpretation Comments

 

             Urine Color (test code = 5778-6) YELLOW       YELLOW               

      





[facility] Rock[facility] Apyxuta2707-19-86 09:18:00* 



             Test Item    Value        Reference Range Interpretation Comments

 

             Urine Clarity (test code = 17243-8) CLOUDY       CLEAR        H    

         





[facility] Rock[facility] Specific Hophjaj9833-62-31 09:18:00
  * 



             Test Item    Value        Reference Range Interpretation Comments

 

             Urine Specific Gravity (test code = 5811-5) 1.030        1.010-1.02

5  H             





[facility] Rock[facility] jS9291-73-79 09:18:00* 



             Test Item    Value        Reference Range Interpretation Comments

 

             Urine pH (test code = 70786-5) 6            5-7                    

    





[facility] Leukocyte Ailkgfsg8197-45-88 
09:18:00* 



             Test Item    Value        Reference Range Interpretation Comments

 

             Urine Leukocyte Esterase (test code = 5799-2) NEGATIVE     NEGATIVE

                   





[facility] Izmnbmi3197-63-46 09:18:00* 



             Test Item    Value        Reference Range Interpretation Comments

 

             Urine Nitrite (test code = 55230-2) NEGATIVE     NEGATIVE          

         





[facility] Scbwren9266-46-69 09:18:00* 



             Test Item    Value        Reference Range Interpretation Comments

 

             Urine Protein (test code = 5804-0) NEGATIVE     NEGATIVE           

        





[facility] Glucose (UA)2018 09:18:00* 



             Test Item    Value        Reference Range Interpretation Comments

 

             Urine Glucose (UA) (test code = 2349-9) NEGATIVE     NEGATIVE      

             





[facility] Jtobjty0437-98-92 09:18:00* 



             Test Item    Value        Reference Range Interpretation Comments

 

             Urine Ketones (test code = 15208-5) NEGATIVE     NEGATIVE          

         





[facility] Tnmijowkebxd1692-21-95 09:18:00* 



             Test Item    Value        Reference Range Interpretation Comments

 

             Urine Urobilinogen (test code = 14784-6) 0.2          0.2-1        

              





[facility] Qtjohxpko1549-61-14 09:18:00* 



             Test Item    Value        Reference Range Interpretation Comments

 

             Urine Bilirubin (test code = 1978-6) NEGATIVE     NEGATIVE         

          





[facility] Gqlha2406-19-23 09:18:00* 



             Test Item    Value        Reference Range Interpretation Comments

 

             Urine Blood (test code = 92565-4) NEGATIVE     NEGATIVE            

       





[facility] Rock[facility] Nxmof9787-91-75 09:18:00* 



             Test Item    Value        Reference Range Interpretation Comments

 

             Urine Color (test code = 5778-6) YELLOW       YELLOW               

      





[facility] RockU[facility] Fkkokhi7963-95-35 09:18:00* 



             Test Item    Value        Reference Range Interpretation Comments

 

             Urine Clarity (test code = 62179-3) CLOUDY       CLEAR        H    

         





[facility] RockU[facility] Specific Kmabmdv5466-38-55 09:18:00
  * 



             Test Item    Value        Reference Range Interpretation Comments

 

             Urine Specific Gravity (test code = 5811-5) 1.030        1.010-1.02

5  H             





[facility] RockU[facility] rY0889-25-32 09:18:00* 



             Test Item    Value        Reference Range Interpretation Comments

 

             Urine pH (test code = 48306-3) 6            5-7                    

    





[facility] Leukocyte Oiyjtrdo7290-29-69 
09:18:00* 



             Test Item    Value        Reference Range Interpretation Comments

 

             Urine Leukocyte Esterase (test code = 5799-2) NEGATIVE     NEGATIVE

                   





[facility] Ohfuiem3597-08-80 09:18:00* 



             Test Item    Value        Reference Range Interpretation Comments

 

             Urine Nitrite (test code = 48645-5) NEGATIVE     NEGATIVE          

         





[facility] Ksysfau0096-09-15 09:18:00* 



             Test Item    Value        Reference Range Interpretation Comments

 

             Urine Protein (test code = 5804-0) NEGATIVE     NEGATIVE           

        





[facility] Glucose (UA)2018 09:18:00* 



             Test Item    Value        Reference Range Interpretation Comments

 

             Urine Glucose (UA) (test code = 2349-9) NEGATIVE     NEGATIVE      

             





[facility] Rock[facility] Nylpigy8032-78-64 09:18:00* 



             Test Item    Value        Reference Range Interpretation Comments

 

             Urine Ketones (test code = 67903-3) NEGATIVE     NEGATIVE          

         





[facility] Ahtadlfeinad1750-93-09 09:18:00* 



             Test Item    Value        Reference Range Interpretation Comments

 

             Urine Urobilinogen (test code = 83709-4) 0.2          0.2-1        

              





[facility] Gsvyxvbuq2879-02-77 09:18:00* 



             Test Item    Value        Reference Range Interpretation Comments

 

             Urine Bilirubin (test code = 1978-6) NEGATIVE     NEGATIVE         

          





[facility] Veaib7013-20-44 09:18:00* 



             Test Item    Value        Reference Range Interpretation Comments

 

             Urine Blood (test code = 58122-5) NEGATIVE     NEGATIVE            

       





[facility] RockClostridium Difficile Toxin A & B
2018 13:18:00* 



             Test Item    Value        Reference Range Interpretation Comments

 

             Clostridium Difficile Toxin A & B (test code = 723871517) NEGATIVE 

    NEGATIVE                   





Testing on stool aspirate specimens is outside  claims since specime
n type not validated on this assay.[facility]
Clostridium Difficile Toxin A & -89-66 13:18:00* 



             Test Item    Value        Reference Range Interpretation Comments

 

             Clostridium Difficile Toxin A & B (test code = 251831032) NEGATIVE 

    NEGATIVE                   





Testing on stool aspirate specimens is outside  claims since specime
n type not validated on this assay.[facility]
Clostridium Difficile Toxin A & -25-20 13:18:00* 



             Test Item    Value        Reference Range Interpretation Comments

 

             Clostridium Difficile Toxin A & B (test code = 613803003) NEGATIVE 

    NEGATIVE                   





Testing on stool aspirate specimens is outside  claims since specime
n type not validated on this assay.[facility]
Potassium Olbrb7708-60-21 06:58:00* 



             Test Item    Value        Reference Range Interpretation Comments

 

             Potassium Level (test code = 2823-3) 3.8          3.5-5.1          

          





[facility]landodium Imfpz7622-62-82 06:38:00* 



             Test Item    Value        Reference Range Interpretation Comments

 

             Sodium Level (test code = 2951-2) 142          136-145             

       





[facility] RockChloride Bespn8525-45-81 06:38:00* 



             Test Item    Value        Reference Range Interpretation Comments

 

             Chloride Level (test code = 2075-0) 114                 H    

         





[facility] RockCarbon Dioxide Lhxqp9669-84-20 06:38:00* 



             Test Item    Value        Reference Range Interpretation Comments

 

             Carbon Dioxide Level (test code = 2028-9) 20           22-29       

 L             





[facility] RockAnion Jce7718-04-96 06:38:00* 



             Test Item    Value        Reference Range Interpretation Comments

 

             Anion Gap (test code = 33037-3) 11.5         8-16                  

     





[facility] RockBlood Urea Vdydxhia2963-70-63 06:38:00* 



             Test Item    Value        Reference Range Interpretation Comments

 

             Blood Urea Nitrogen (test code = 3094-0) 9            7-26         

              





[facility] RockCreatinine2018-05-30 06:38:00* 



             Test Item    Value        Reference Range Interpretation Comments

 

             Creatinine (test code = 2160-0) 0.59         0.57-1.11             

     





[facility] RockBUN/Creatinine Ntsho4860-60-33 06:38:00* 



             Test Item    Value        Reference Range Interpretation Comments

 

             BUN/Creatinine Ratio (test code = 3097-3) 15           6-25        

               





[facility] RockEstimat Glomerular Filtration Rate
2018 06:38:00* 



             Test Item    Value        Reference Range Interpretation Comments

 

             Estimat Glomerular Filtration Rate (test code = 15374-2) 60-       

   >60                        





Ranges were taken from the National Kidney Disease Education Program and the [facility] Foundation literature.Reference ranges:60 or greater: Izorkc02-94 (
for 3 consecutive months): Chronic kidney disease 15 or less: Kidney failureB[facility] Scott & [facility] – Round RockGlucose Tqfrc1325-83-92 06:38:00* 



             Test Item    Value        Reference Range Interpretation Comments

 

             Glucose Level (test code = DJM6024) 88                       

         





[facility] RockCalcium Dfhbs9591-95-38 06:38:00* 



             Test Item    Value        Reference Range Interpretation Comments

 

             Calcium Level (test code = 01433-7) 7.5          8.4-10.2     L    

         





[facility] RockTotal Shkgwkgkw3151-00-81 06:38:00* 



             Test Item    Value        Reference Range Interpretation Comments

 

             Total Bilirubin (test code = 1975-2) 0.2          0.2-1.2          

          





[facility] RockAspartate Amino Transf (AST/SGOT)
2018 06:38:00* 



             Test Item    Value        Reference Range Interpretation Comments

 

                                        Aspartate Amino Transf (AST/SGOT) (test 

code = Aspartate Amino Transf 

(AST/SGOT))     9               5-34                             





[facility] RockAlanine Aminotransferase (ALT/SGPT)
2018 06:38:00* 



             Test Item    Value        Reference Range Interpretation Comments

 

             Alanine Aminotransferase (ALT/SGPT) (test code = 1742-6) 6         

   0-55                       





[facility] RockTotal Njdeogg7274-35-37 06:38:00* 



             Test Item    Value        Reference Range Interpretation Comments

 

             Total Protein (test code = 2885-2) 4.7          6.5-8.1      L     

        





[facility] RockAlbumin2018-05-30 06:38:00* 



             Test Item    Value        Reference Range Interpretation Comments

 

             Albumin (test code = 1751-7) 2.1          3.5-5.0      L           

  





[facility] RockGlobulin2018-05-30 06:38:00* 



             Test Item    Value        Reference Range Interpretation Comments

 

             Globulin (test code = 29577-9) 2.6          2.3-3.5                

    





[facility] RockAlbumin/Globulin Azqmm7761-02-44 06:38:00
  * 



             Test Item    Value        Reference Range Interpretation Comments

 

             Albumin/Globulin Ratio (test code = 1759-0) 0.8          0.8-2.0   

                 





[facility] RockAlkaline Xftitjsbrha5450-69-51 06:38:00* 



             Test Item    Value        Reference Range Interpretation Comments

 

             Alkaline Phosphatase (test code = 6768-6) 60                 

               





[facility] RockWhite Blood Kjxje9952-60-19 06:20:00* 



             Test Item    Value        Reference Range Interpretation Comments

 

             White Blood Count (test code = 6690-2) 4.75         4.8-10.8     L 

            





[facility] RockRed Blood Ucmzb0344-00-95 06:20:00* 



             Test Item    Value        Reference Range Interpretation Comments

 

             Red Blood Count (test code = 789-8) 3.35         3.6-5.1      L    

         





[facility] RockHemoglobin2018-05-30 06:20:00* 



             Test Item    Value        Reference Range Interpretation Comments

 

             Hemoglobin (test code = 89110-1) 8.9          12.0-16.0    L       

      





[facility] RockHematocrit2018-05-30 06:20:00* 



             Test Item    Value        Reference Range Interpretation Comments

 

             Hematocrit (test code = 4544-3) 28.6         34.2-44.1    L        

     





[facility] RockMean Corpuscular Tldmhm1316-58-68 
06:20:00* 



             Test Item    Value        Reference Range Interpretation Comments

 

             Mean Corpuscular Volume (test code = 787-2) 85.4         81-99     

                 





[facility] RockMean Corpuscular Rxipudxbhc5555-81-89 
06:20:00* 



             Test Item    Value        Reference Range Interpretation Comments

 

             Mean Corpuscular Hemoglobin (test code = 785-6) 26.6         28-32 

       L             





[facility] RockMean Corpuscular Hemoglobin Concent
2018 06:20:00* 



             Test Item    Value        Reference Range Interpretation Comments

 

             Mean Corpuscular Hemoglobin Concent (test code = 786-4) 31.1       

  31-35                      





[facility] RockRed Cell Distribution Clknw1585-26-97 
06:20:00* 



             Test Item    Value        Reference Range Interpretation Comments

 

             Red Cell Distribution Width (test code = 82718-7) 16.5         11.7

-14.4    H             





[facility] Round RockPlatelet Zrcak8990-08-64 06:20:00* 



             Test Item    Value        Reference Range Interpretation Comments

 

             Platelet Count (test code = 777-3) 286          140-360            

        





[facility] RockNeutrophils (%) (Auto)2018 06:20:00
  * 



             Test Item    Value        Reference Range Interpretation Comments

 

             Neutrophils (%) (Auto) (test code = 89008-0) 59.2         38.7-80.0

                  





[facility] RockLymphocytes (%) (Auto)2018 06:20:00
  * 



             Test Item    Value        Reference Range Interpretation Comments

 

             Lymphocytes (%) (Auto) (test code = 736-9) 22.7         18.0-39.1  

                





[facility] RockMonocytes (%) (Auto)2018 06:20:00* 



             Test Item    Value        Reference Range Interpretation Comments

 

             Monocytes (%) (Auto) (test code = 5905-5) 15.2         4.4-11.3    

 H             





[facility] RockEosinophils (%) (Auto)2018 06:20:00
  * 



             Test Item    Value        Reference Range Interpretation Comments

 

             Eosinophils (%) (Auto) (test code = 713-8) 1.9          0.0-6.0    

                





[facility] RockBasophils (%) (Auto)2018 06:20:00* 



             Test Item    Value        Reference Range Interpretation Comments

 

             Basophils (%) (Auto) (test code = 706-2) 0.6          0.0-1.0      

              





[facility] RockIM GRANULOCYTES %2018 06:20:00* 



             Test Item    Value        Reference Range Interpretation Comments

 

             IM GRANULOCYTES % (test code = IM GRANULOCYTES %) 0.4          0.0-

1.0                    





[facility] RockNeutrophils # (Auto)2018 06:20:00* 



             Test Item    Value        Reference Range Interpretation Comments

 

             Neutrophils # (Auto) (test code = 751-8) 2.8          2.1-6.9      

              





[facility] RockLymphocytes # (Auto)2018 06:20:00* 



             Test Item    Value        Reference Range Interpretation Comments

 

             Lymphocytes # (Auto) (test code = 30063-5) 1.1          1.0-3.2    

                





[facility] RockMonocytes # (Auto)2018 06:20:00* 



             Test Item    Value        Reference Range Interpretation Comments

 

             Monocytes # (Auto) (test code = 742-7) 0.7          0.2-0.8        

            





[facility] RockEosinophils # (Auto)2018 06:20:00* 



             Test Item    Value        Reference Range Interpretation Comments

 

             Eosinophils # (Auto) (test code = 711-2) 0.1          0.0-0.4      

              





[facility] RockBasophils # (Auto)2018 06:20:00* 



             Test Item    Value        Reference Range Interpretation Comments

 

             Basophils # (Auto) (test code = 704-7) 0.0          0.0-0.1        

            





[facility] RockAbsolute Immature Granulocyte (auto
2018 06:20:00* 



             Test Item    Value        Reference Range Interpretation Comments

 

                                        Absolute Immature Granulocyte (auto (teri

t code = Absolute Immature Granulocyte 

(auto)          0.02            0-0.1                            





[facility] TJD2950-06-48 14:48:00* 



             Test Item    Value        Reference Range Interpretation Comments

 

             Urine WBC (test code = 5821-4) 0-5          0-5                    

    





[facility] YWT5225-16-48 14:48:00* 



             Test Item    Value        Reference Range Interpretation Comments

 

             Urine RBC (test code = 36807-7) 0-5          0-5                   

     





[facility] Rock[facility] Ymxqtact4553-65-32 14:48:00* 



             Test Item    Value        Reference Range Interpretation Comments

 

             Urine Bacteria (test code = 88079-3) NONE         NONE             

          





[facility] Rock[facility] Epithelial Kbbus9570-45-19 14:48:00
  * 



             Test Item    Value        Reference Range Interpretation Comments

 

             Urine Epithelial Cells (test code = 96662-4) MANY         NONE     

                  





[facility] Rock[facility] Uahck2014-26-96 14:48:00* 



             Test Item    Value        Reference Range Interpretation Comments

 

             Urine Mucus (test code = 8247-9) FEW          RARE         H       

      





[facility] Zhibg0082-94-40 14:48:00* 



             Test Item    Value        Reference Range Interpretation Comments

 

             Urine Mucus (test code = 8247-9) FEW          RARE         H       

      





[facility] Shjzv1160-12-08 14:48:00* 



             Test Item    Value        Reference Range Interpretation Comments

 

             Urine Mucus (test code = 8247-9) FEW          RARE         H       

      





[facility] Gfhkb9429-21-29 14:36:00* 



             Test Item    Value        Reference Range Interpretation Comments

 

             Urine Color (test code = 5778-6) YELLOW       YELLOW               

      





[facility] RockU[facility] Rwenrka3913-94-16 14:36:00* 



             Test Item    Value        Reference Range Interpretation Comments

 

             Urine Clarity (test code = 28907-7) CLEAR        CLEAR             

         





[facility] Rock[facility] Specific Cbpzfgc9527-36-83 14:36:00
  * 



             Test Item    Value        Reference Range Interpretation Comments

 

             Urine Specific Gravity (test code = 5811-5) 1.005        1.010-1.02

5  L             





[facility] Rock[facility] oZ1743-72-95 14:36:00* 



             Test Item    Value        Reference Range Interpretation Comments

 

             Urine pH (test code = 36202-8) 5            5-7                    

    





[facility] Rock[facility] Leukocyte Giwrtrzr7815-74-98 
14:36:00* 



             Test Item    Value        Reference Range Interpretation Comments

 

             Urine Leukocyte Esterase (test code = 5799-2) NEGATIVE     NEGATIVE

                   





[facility] Ywbfzmw6179-81-01 14:36:00* 



             Test Item    Value        Reference Range Interpretation Comments

 

             Urine Nitrite (test code = 04966-1) NEGATIVE     NEGATIVE          

         





[facility] Tbfhckp7769-79-52 14:36:00* 



             Test Item    Value        Reference Range Interpretation Comments

 

             Urine Protein (test code = 5804-0) NEGATIVE     NEGATIVE           

        





[facility] Rock[facility] Glucose (UA)2018 14:36:00* 



             Test Item    Value        Reference Range Interpretation Comments

 

             Urine Glucose (UA) (test code = 2349-9) NEGATIVE     NEGATIVE      

             





[facility] Rock[facility] Jhwzrnu1386-65-58 14:36:00* 



             Test Item    Value        Reference Range Interpretation Comments

 

             Urine Ketones (test code = 35269-9) NEGATIVE     NEGATIVE          

         





[facility] Xbqptemesvoz9816-50-62 14:36:00* 



             Test Item    Value        Reference Range Interpretation Comments

 

             Urine Urobilinogen (test code = 34344-0) 0.2          0.2-1        

              





[facility] Opxnrfvnk4093-06-13 14:36:00* 



             Test Item    Value        Reference Range Interpretation Comments

 

             Urine Bilirubin (test code = 1978-6) NEGATIVE     NEGATIVE         

          





[facility] Yaeva6012-00-35 14:36:00* 



             Test Item    Value        Reference Range Interpretation Comments

 

             Urine Blood (test code = 69726-3) NEGATIVE     NEGATIVE            

       





[facility] RockCreatine Kinase AO5064-88-45 13:28:00* 



             Test Item    Value        Reference Range Interpretation Comments

 

             Creatine Kinase MB (test code = 59947-3) 1.10         0-5.0        

              





[facility] RockTroponin -71-15 13:28:00* 



             Test Item    Value        Reference Range Interpretation Comments

 

             Troponin I (test code = IJR8759) 0.005        0-0.300              

      





[facility] RockCreatine Kinase FM6114-37-39 13:28:00* 



             Test Item    Value        Reference Range Interpretation Comments

 

             Creatine Kinase MB (test code = 28524-1) 1.10         0-5.0        

              





[facility] RockTrMetropolitan Hospitalnin -93-87 13:28:00* 



             Test Item    Value        Reference Range Interpretation Comments

 

             Troponin I (test code = UMY9728) 0.005        0-0.300              

      





[facility] RockCreatine Dohmso3026-55-50 13:23:00* 



             Test Item    Value        Reference Range Interpretation Comments

 

             Creatine Kinase (test code = 2157-6) 47                      

          





[facility] RockLipase2018-05-29 13:23:00* 



             Test Item    Value        Reference Range Interpretation Comments

 

             Lipase (test code = 3040-3) 12           8-78                      

 





[facility] RockCreatine Pxcqxn0867-76-66 13:23:00* 



             Test Item    Value        Reference Range Interpretation Comments

 

             Creatine Kinase (test code = 2157-6) 47                      

          





[facility] Rgkzhsr8810-91-33 20:02:00* 



             Test Item    Value        Reference Range Interpretation Comments

 

             Blood Culture (test code = 54793353) NO GROWTH AFTER 5 DAYS, FINAL 

REPORT                            





[facility] Obcnzkv1731-92-82 20:02:00* 



             Test Item    Value        Reference Range Interpretation Comments

 

             Blood Culture (test code = 81593396) NO GROWTH AFTER 5 DAYS, FINAL 

REPORT                            





[facility] Pvdqzjt6452-17-08 20:02:00* 



             Test Item    Value        Reference Range Interpretation Comments

 

                          Blood Culture (test code = 34787432)                  

           NO GROWTH AFTER

72 HOURS                                                     





[facility] RockUrine Ggnmfae5999-50-29 07:48:00* 



             Test Item    Value        Reference Range Interpretation Comments

 

             Urine Culture (test code = 630-4) Organism: ESCHERICHIA COLI       

                     





[facility] Rock[facility] Tddrsfo1406-88-97 07:48:00* 



             Test Item    Value        Reference Range Interpretation Comments

 

             Urine Culture (test code = 630-4) Organism: ESCHERICHIA COLI       

                     





[facility] RockUrine Vicownn6619-42-77 07:48:00* 



             Test Item    Value        Reference Range Interpretation Comments

 

             Urine Culture (test code = 630-4) Organism: ESCHERICHIA COLI       

                     





[facility]landodium Vdrbw9224-65-85 11:26:00* 



             Test Item    Value        Reference Range Interpretation Comments

 

             Sodium Level (test code = 2951-2) 139          136-145             

       





[facility] RockPotassium Dfxtt1606-81-13 11:26:00* 



             Test Item    Value        Reference Range Interpretation Comments

 

             Potassium Level (test code = 2823-3) 4.1          3.5-5.1          

          





[facility] RockChloride Euqmr2063-88-15 11:26:00* 



             Test Item    Value        Reference Range Interpretation Comments

 

             Chloride Level (test code = 2075-0) 111                 H    

         





[facility] RockCarbon Dioxide Vcluv8095-75-92 11:26:00* 



             Test Item    Value        Reference Range Interpretation Comments

 

             Carbon Dioxide Level (test code = 2028-9) 21           22-29       

 L             





[facility] RockAnion Lzc6602-93-99 11:26:00* 



             Test Item    Value        Reference Range Interpretation Comments

 

             Anion Gap (test code = 33037-3) 11.1         8-16                  

     





[facility] RockBlood Urea Sohqkmvj6413-76-03 11:26:00* 



             Test Item    Value        Reference Range Interpretation Comments

 

             Blood Urea Nitrogen (test code = 3094-0) 5            7-26         

L             





[facility] RockCreatinine2018-04-02 11:26:00* 



             Test Item    Value        Reference Range Interpretation Comments

 

             Creatinine (test code = 2160-0) 0.61         0.57-1.11             

     





[facility] RockBUN/Creatinine Nqyrx8699-56-46 11:26:00* 



             Test Item    Value        Reference Range Interpretation Comments

 

             BUN/Creatinine Ratio (test code = 3097-3) 8            6-25        

               





[facility] RockEstimat Glomerular Filtration Rate
2018 11:26:00* 



             Test Item    Value        Reference Range Interpretation Comments

 

             Estimat Glomerular Filtration Rate (test code = 42697-5) 60-       

   >60                        





Ranges were taken from the National Kidney Disease Education Program and the [facility] literature.Reference ranges:60 or greater: Rgamhq99-57 (
for 3 consecutive months): Chronic kidney disease 15 or less: Kidney failureBaylor Scott & [facility] Center – Round RockGlucose Cvmww9074-51-68 11:26:00* 



             Test Item    Value        Reference Range Interpretation Comments

 

             Glucose Level (test code = EPG4061) 111                      

         





[facility] RockCalcium Ybgvw4921-34-93 11:26:00* 



             Test Item    Value        Reference Range Interpretation Comments

 

             Calcium Level (test code = 36004-8) 7.8          8.4-10.2     L    

         





[facility] RockPhosphorus Rjkom8889-64-10 11:26:00* 



             Test Item    Value        Reference Range Interpretation Comments

 

             Phosphorus Level (test code = NNJ5180) 2.5          2.3-4.7        

            





[facility] RockMagnesium Ieiaa9154-80-34 11:26:00* 



             Test Item    Value        Reference Range Interpretation Comments

 

             Magnesium Level (test code = 04756-0) 2.0          1.3-2.1         

           





[facility] RockPhosphorus Bejiz3600-53-40 11:26:00* 



             Test Item    Value        Reference Range Interpretation Comments

 

             Phosphorus Level (test code = JED4613) 2.5          2.3-4.7        

            





[facility] RockMagnBell[facility] Hospital Gkean2789-81-22 11:26:00* 



             Test Item    Value        Reference Range Interpretation Comments

 

             Magnesium Level (test code = 64480-1) 2.0          1.3-2.1         

           





[facility] RockPhosphorus Pipmx4590-89-81 11:26:00* 



             Test Item    Value        Reference Range Interpretation Comments

 

             Phosphorus Level (test code = HVI9228) 2.5          2.3-4.7        

            





[facility]gn[facility] Quuby6501-85-61 11:26:00* 



             Test Item    Value        Reference Range Interpretation Comments

 

             Magnesium Level (test code = 05290-2) 2.0          1.3-2.1         

           





[facility] RockCreatine Kinase EX1073-09-01 04:04:00* 



             Test Item    Value        Reference Range Interpretation Comments

 

             Creatine Kinase MB (test code = 59781-6) 3.30         0-5.0        

              





[facility] RockTroponin -44-58 04:04:00* 



             Test Item    Value        Reference Range Interpretation Comments

 

             Troponin I (test code = ZPV2947) 0.355        0-0.300      H       

      





[facility] RockCreatine Uxhsgr1694-95-33 03:57:00* 



             Test Item    Value        Reference Range Interpretation Comments

 

             Creatine Kinase (test code = 2157-6) 136                     

          





[facility] RockDifferential Total Cells Counted
2018 11:11:00* 



             Test Item    Value        Reference Range Interpretation Comments

 

                          Differential Total Cells Counted (test code = Differen

tial Total Cells Counted) 

100                                                          





[facility] RockNeutrophils % (Manual)2018 11:11:00
  * 



             Test Item    Value        Reference Range Interpretation Comments

 

             Neutrophils % (Manual) (test code = 11809-4) 50           40-74    

                  





[facility] RockBand Neutrophils %2018 11:11:00* 



             Test Item    Value        Reference Range Interpretation Comments

 

             Band Neutrophils % (test code = 764-1) 24                          

            





[facility] RockLymphocytes % (Manual)2018 11:11:00
  * 



             Test Item    Value        Reference Range Interpretation Comments

 

             Lymphocytes % (Manual) (test code = 737-7) 19           19-48      

                





[facility] RockMonocytes % (Manual)2018 11:11:00* 



             Test Item    Value        Reference Range Interpretation Comments

 

             Monocytes % (Manual) (test code = 744-3) 5            3.4-9.0      

              





[facility] RockEosinophils % (Manual)2018 11:11:00
  * 



             Test Item    Value        Reference Range Interpretation Comments

 

             Eosinophils % (Manual) (test code = 714-6) 2            0-7        

                





[facility] RockPlatelet Wqiyjwwl0431-56-12 11:11:00* 



             Test Item    Value        Reference Range Interpretation Comments

 

             Platelet Estimate (test code = 89010-4) ADEQUATE                   

             





[facility] RockPlatelet Morphology Ppvzdok9809-72-66 
11:11:00* 



             Test Item    Value        Reference Range Interpretation Comments

 

             Platelet Morphology Comment (test code = 82062-9) NORMAL           

                       





[facility] RockRed Cell Morphology Bpecgwj5561-75-35 
11:11:00* 



             Test Item    Value        Reference Range Interpretation Comments

 

             Red Cell Morphology Comment (test code = 6742-1) NORMAL            

                      





[facility] RockDifferential Total Cells Counted
2018 11:11:00* 



             Test Item    Value        Reference Range Interpretation Comments

 

                          Differential Total Cells Counted (test code = Differen

tial Total Cells Counted) 

100                                                          





[facility] RockNeutrophils % (Manual)2018 11:11:00
  * 



             Test Item    Value        Reference Range Interpretation Comments

 

             Neutrophils % (Manual) (test code = 05218-8) 50           40-74    

                  





[facility] RockBand Neutrophils %2018 11:11:00* 



             Test Item    Value        Reference Range Interpretation Comments

 

             Band Neutrophils % (test code = 764-1) 24                          

            





[facility] RockLymphocytes % (Manual)2018 11:11:00
  * 



             Test Item    Value        Reference Range Interpretation Comments

 

             Lymphocytes % (Manual) (test code = 737-7) 19           19-48      

                





[facility] RockMonocytes % (Manual)2018 11:11:00* 



             Test Item    Value        Reference Range Interpretation Comments

 

             Monocytes % (Manual) (test code = 744-3) 5            3.4-9.0      

              





[facility] RockEosinophils % (Manual)2018 11:11:00
  * 



             Test Item    Value        Reference Range Interpretation Comments

 

             Eosinophils % (Manual) (test code = 714-6) 2            0-7        

                





[facility] RockPlatelet Hxkdciay3129-64-39 11:11:00* 



             Test Item    Value        Reference Range Interpretation Comments

 

             Platelet Estimate (test code = 33424-5) ADEQUATE                   

             





[facility] RockPlatelet Morphology Xeivsiu7863-04-42 
11:11:00* 



             Test Item    Value        Reference Range Interpretation Comments

 

             Platelet Morphology Comment (test code = 00447-3) NORMAL           

                       





[facility] RockRed Cell Morphology Apvizlb6066-61-67 
11:11:00* 



             Test Item    Value        Reference Range Interpretation Comments

 

             Red Cell Morphology Comment (test code = 6742-1) NORMAL            

                      





[facility] RockDifferential Total Cells Counted
2018 11:11:00* 



             Test Item    Value        Reference Range Interpretation Comments

 

                          Differential Total Cells Counted (test code = Differdillon

tial Total Cells Counted) 

100                                                          





[facility] RockNeutrophils % (Manual)2018 11:11:00
  * 



             Test Item    Value        Reference Range Interpretation Comments

 

             Neutrophils % (Manual) (test code = 76739-4) 50           40-74    

                  





[facility] RockBand Neutrophils %2018 11:11:00* 



             Test Item    Value        Reference Range Interpretation Comments

 

             Band Neutrophils % (test code = 764-1) 24                          

            





[facility] RockLymphocytes % (Manual)2018 11:11:00
  * 



             Test Item    Value        Reference Range Interpretation Comments

 

             Lymphocytes % (Manual) (test code = 737-7) 19           19-48      

                





[facility] RockMonocytes % (Manual)2018 11:11:00* 



             Test Item    Value        Reference Range Interpretation Comments

 

             Monocytes % (Manual) (test code = 744-3) 5            3.4-9.0      

              





[facility] RockEosinophils % (Manual)2018 11:11:00
  * 



             Test Item    Value        Reference Range Interpretation Comments

 

             Eosinophils % (Manual) (test code = 714-6) 2            0-7        

                





[facility] RockPlatelet Nbigwxpu8994-01-59 11:11:00* 



             Test Item    Value        Reference Range Interpretation Comments

 

             Platelet Estimate (test code = 39811-3) ADEQUATE                   

             





[facility] RockPlatelet Morphology Itsessk1928-46-56 
11:11:00* 



             Test Item    Value        Reference Range Interpretation Comments

 

             Platelet Morphology Comment (test code = 61358-0) NORMAL           

                       





[facility] RockRed Cell Morphology Rwbmvsn0093-53-88 
11:11:00* 



             Test Item    Value        Reference Range Interpretation Comments

 

             Red Cell Morphology Comment (test code = 6742-1) NORMAL            

                      





[facility] RockWhite Blood Sdbzu0280-78-29 09:39:00* 



             Test Item    Value        Reference Range Interpretation Comments

 

             White Blood Count (test code = 6690-2) 7.48         4.8-10.8       

            





[facility] RockRed Blood Azilv7265-21-12 09:39:00* 



             Test Item    Value        Reference Range Interpretation Comments

 

             Red Blood Count (test code = 789-8) 3.31         3.6-5.1      L    

         





[facility] RockHemoglobin2018-04-01 09:39:00* 



             Test Item    Value        Reference Range Interpretation Comments

 

             Hemoglobin (test code = 84734-7) 9.2          12.0-16.0    L       

      





[facility] RockHematocrit2018-04-01 09:39:00* 



             Test Item    Value        Reference Range Interpretation Comments

 

             Hematocrit (test code = 4544-3) 28.9         34.2-44.1    L        

     





[facility] RockMean Corpuscular Mluped5951-12-03 
09:39:00* 



             Test Item    Value        Reference Range Interpretation Comments

 

             Mean Corpuscular Volume (test code = 787-2) 87.3         81-99     

                 





[facility] RockMean Corpuscular Gwiasmnwha9951-85-67 
09:39:00* 



             Test Item    Value        Reference Range Interpretation Comments

 

             Mean Corpuscular Hemoglobin (test code = 785-6) 27.8         28-32 

       L             





[facility] RockMean Corpuscular Hemoglobin Concent
2018 09:39:00* 



             Test Item    Value        Reference Range Interpretation Comments

 

             Mean Corpuscular Hemoglobin Concent (test code = 786-4) 31.8       

  31-35                      





[facility] RockRed Cell Distribution Odast5325-74-78 
09:39:00* 



             Test Item    Value        Reference Range Interpretation Comments

 

             Red Cell Distribution Width (test code = 08254-2) 16.6         11.7

-14.4    H             





[facility] RockPlatelet Lidsg8917-98-42 09:39:00* 



             Test Item    Value        Reference Range Interpretation Comments

 

             Platelet Count (test code = 777-3) 267          140-360            

        





[facility] RockNeutrophils (%) (Auto)2018 09:39:00
  * 



             Test Item    Value        Reference Range Interpretation Comments

 

             Neutrophils (%) (Auto) (test code = 97744-6) 71.1         38.7-80.0

                  





[facility] RockLymphocytes (%) (Auto)2018 09:39:00
  * 



             Test Item    Value        Reference Range Interpretation Comments

 

             Lymphocytes (%) (Auto) (test code = 736-9) 15.8         18.0-39.1  

  L             





[facility] RockMonocytes (%) (Auto)2018 09:39:00* 



             Test Item    Value        Reference Range Interpretation Comments

 

             Monocytes (%) (Auto) (test code = 5905-5) 10.3         4.4-11.3    

               





[facility] RockEosinophils (%) (Auto)2018 09:39:00
  * 



             Test Item    Value        Reference Range Interpretation Comments

 

             Eosinophils (%) (Auto) (test code = 713-8) 1.2          0.0-6.0    

                





[facility] RockBasophils (%) (Auto)2018 09:39:00* 



             Test Item    Value        Reference Range Interpretation Comments

 

             Basophils (%) (Auto) (test code = 706-2) 0.5          0.0-1.0      

              





[facility] RockIM GRANULOCYTES %2018 09:39:00* 



             Test Item    Value        Reference Range Interpretation Comments

 

             IM GRANULOCYTES % (test code = IM GRANULOCYTES %) 1.1          0.0-

1.0      H             





[facility] RockNeutrophils # (Auto)2018 09:39:00* 



             Test Item    Value        Reference Range Interpretation Comments

 

             Neutrophils # (Auto) (test code = 751-8) 5.3          2.1-6.9      

              





[facility] RockLymphocytes # (Auto)2018 09:39:00* 



             Test Item    Value        Reference Range Interpretation Comments

 

             Lymphocytes # (Auto) (test code = 94842-3) 1.2          1.0-3.2    

                





[facility] RockMonocytes # (Auto)2018 09:39:00* 



             Test Item    Value        Reference Range Interpretation Comments

 

             Monocytes # (Auto) (test code = 742-7) 0.8          0.2-0.8        

            





[facility] RockEosinophils # (Auto)2018 09:39:00* 



             Test Item    Value        Reference Range Interpretation Comments

 

             Eosinophils # (Auto) (test code = 711-2) 0.1          0.0-0.4      

              





[facility] RockBasophils # (Auto)2018 09:39:00* 



             Test Item    Value        Reference Range Interpretation Comments

 

             Basophils # (Auto) (test code = 704-7) 0.0          0.0-0.1        

            





[facility] RockAbsolute Immature Granulocyte (auto
2018 09:39:00* 



             Test Item    Value        Reference Range Interpretation Comments

 

                                        Absolute Immature Granulocyte (auto (teri

t code = Absolute Immature Granulocyte 

(auto)          0.08            0-0.1                            





[facility] RockTotal Whoppyupv8856-26-32 09:39:00* 



             Test Item    Value        Reference Range Interpretation Comments

 

             Total Bilirubin (test code = 1975-2) 0.2          0.2-1.2          

          





[facility] RockAspartate Amino Transf (AST/SGOT)
2018 09:39:00* 



             Test Item    Value        Reference Range Interpretation Comments

 

                                        Aspartate Amino Transf (AST/SGOT) (test 

code = Aspartate Amino Transf 

(AST/SGOT))     16              5-34                             





[facility] RockAlanine Aminotransferase (ALT/SGPT)
2018 09:39:00* 



             Test Item    Value        Reference Range Interpretation Comments

 

             Alanine Aminotransferase (ALT/SGPT) (test code = 1742-6) 8         

   0-55                       





[facility] RockTotal Skttquj5236-49-78 09:39:00* 



             Test Item    Value        Reference Range Interpretation Comments

 

             Total Protein (test code = 2885-2) 4.7          6.5-8.1      L     

        





[facility] RockAlbumin2018-04-01 09:39:00* 



             Test Item    Value        Reference Range Interpretation Comments

 

             Albumin (test code = 1751-7) 2.2          3.5-5.0      L           

  





[facility] RockGlobulin2018-04-01 09:39:00* 



             Test Item    Value        Reference Range Interpretation Comments

 

             Globulin (test code = 69066-9) 2.5          2.3-3.5                

    





[facility] RockAlbumin/Globulin Qvdsk3441-09-24 09:39:00
  * 



             Test Item    Value        Reference Range Interpretation Comments

 

             Albumin/Globulin Ratio (test code = 1759-0) 0.9          0.8-2.0   

                 





[facility] RockAlkaline Bmjuoyoqkyj4342-15-20 09:39:00* 



             Test Item    Value        Reference Range Interpretation Comments

 

             Alkaline Phosphatase (test code = 6768-6) 81                 

               





[facility] RockThyroid Stimulating Hormone (TSH)
2018 00:03:00* 



             Test Item    Value        Reference Range Interpretation Comments

 

             Thyroid Stimulating Hormone (TSH) (test code = 91954-7) 1.924      

  0.350-4.940                





[facility] RockThyroid Stimulating Hormone (TSH)
2018 00:03:00* 



             Test Item    Value        Reference Range Interpretation Comments

 

             Thyroid Stimulating Hormone (TSH) (test code = 88564-3) 1.924      

  0.350-4.940                





[facility] RockThyroid Stimulating Hormone (TSH)
2018 00:03:00* 



             Test Item    Value        Reference Range Interpretation Comments

 

             Thyroid Stimulating Hormone (TSH) (test code = 07021-2) 1.924      

  0.350-4.940                





[facility] RockLactic Acid Mazdh8627-80-69 23:44:00* 



             Test Item    Value        Reference Range Interpretation Comments

 

             Lactic Acid Level (test code = Lactic Acid Level) 17.4         4.5-

19.8                   





[facility] RockLactic Acid Ouzph4836-76-93 23:44:00* 



             Test Item    Value        Reference Range Interpretation Comments

 

             Lactic Acid Level (test code = Lactic Acid Level) 17.4         4.5-

19.8                   





[facility] RockProthrombin Iqgy2884-04-47 23:40:00* 



             Test Item    Value        Reference Range Interpretation Comments

 

             Prothrombin Time (test code = 5902-2) 15.5         11.9-14.5    H  

           





[facility] RockProthromb Time International Ratio
2018 23:40:00* 



             Test Item    Value        Reference Range Interpretation Comments

 

             Prothromb Time International Ratio (test code = 6301-6) 1.33       

                             





Oral Anticoagulant Therapy INR Values:1. Low Intensity Therapy        1.5 - 2.02
. Moderate Intensity Therapy   2.0 - 3.03. High Intensity Therapy(1)    2.5 - 3.
54. High Intensity Therapy(2)    3.0 - 4.05. Panic Value INR              > 5.0
[facility] RockActivated Partial Thromboplast Time
2018 23:40:00* 



             Test Item    Value        Reference Range Interpretation Comments

 

             Activated Partial Thromboplast Time (test code = 85897-4) 33.2     

    23.8-35.5                  





[facility] RockProthrombin Ghhv3563-11-02 23:40:00* 



             Test Item    Value        Reference Range Interpretation Comments

 

             Prothrombin Time (test code = 5902-2) 15.5         11.9-14.5    H  

           





[facility] RockProthromb Time International Ratio
2018 23:40:00* 



             Test Item    Value        Reference Range Interpretation Comments

 

             Prothromb Time International Ratio (test code = 6301-6) 1.33       

                             





Oral Anticoagulant Therapy INR Values:1. Low Intensity Therapy        1.5 - 2.02
. Moderate Intensity Therapy   2.0 - 3.03. High Intensity Therapy(1)    2.5 - 3.
54. High Intensity Therapy(2)    3.0 - 4.05. Panic Value INR              > 5.0
[facility] RockActivated Partial Thromboplast Time
2018 23:40:00* 



             Test Item    Value        Reference Range Interpretation Comments

 

             Activated Partial Thromboplast Time (test code = 80439-2) 33.2     

    23.8-35.5                  





[facility] RockProthrombin Clao0385-20-48 23:40:00* 



             Test Item    Value        Reference Range Interpretation Comments

 

             Prothrombin Time (test code = 5902-2) 15.5         11.9-14.5    H  

           





[facility] RockProthromb Time International Ratio
2018 23:40:00* 



             Test Item    Value        Reference Range Interpretation Comments

 

             Prothromb Time International Ratio (test code = 6301-6) 1.33       

                             





Oral Anticoagulant Therapy INR Values:1. Low Intensity Therapy        1.5 - 2.02
. Moderate Intensity Therapy   2.0 - 3.03. High Intensity Therapy(1)    2.5 - 3.
54. High Intensity Therapy(2)    3.0 - 4.05. Panic Value INR              > 5.0
[facility] RockActivated Partial Thromboplast Time
2018 23:40:00* 



             Test Item    Value        Reference Range Interpretation Comments

 

             Activated Partial Thromboplast Time (test code = 29260-4) 33.2     

    23.8-35.5                  





[facility] RockUrine [id number] 20:44:00* 



             Test Item    Value        Reference Range Interpretation Comments

 

             Urine WBC (test code = 5821-4) 50-          0-5          H         

    





[facility] RockUrine [id number] 20:44:00* 



             Test Item    Value        Reference Range Interpretation Comments

 

             Urine RBC (test code = 77941-7) 21-50        0-5          H        

     





[facility] RockUrine Lkxmptft6345-92-01 20:44:00* 



             Test Item    Value        Reference Range Interpretation Comments

 

             Urine Bacteria (test code = 05744-0) MANY         NONE         H   

          





[facility] RockUrine Epithelial Indah9702-80-46 20:44:00
  * 



             Test Item    Value        Reference Range Interpretation Comments

 

             Urine Epithelial Cells (test code = 26375-3) MANY         NONE     

                  





[facility] RockAnisocytosis2018-03-31 20:35:00* 



             Test Item    Value        Reference Range Interpretation Comments

 

             Anisocytosis (test code = 702-1) SLIGHT                            

      





[facility] RockAnisocytosis2018-03-31 20:35:00* 



             Test Item    Value        Reference Range Interpretation Comments

 

             Anisocytosis (test code = 702-1) SLIGHT                            

      





[facility] RockAnisocytosis2018-03-31 20:35:00* 



             Test Item    Value        Reference Range Interpretation Comments

 

             Anisocytosis (test code = 702-1) SLIGHT                            

      





[facility] RockUrine Bwebb4169-44-24 20:25:00* 



             Test Item    Value        Reference Range Interpretation Comments

 

             Urine Color (test code = 5778-6) YELLOW       YELLOW               

      





[facility] RockUrine Vufelas7243-09-95 20:25:00* 



             Test Item    Value        Reference Range Interpretation Comments

 

             Urine Clarity (test code = 59573-6) CLOUDY       CLEAR        H    

         





[facility] RockUrine Specific Zzosrax5002-99-43 20:25:00
  * 



             Test Item    Value        Reference Range Interpretation Comments

 

             Urine Specific Gravity (test code = 5811-5) 1.015        1.010-1.02

5                





[facility] RockUrine [id number] 20:25:00* 



             Test Item    Value        Reference Range Interpretation Comments

 

             Urine pH (test code = 04683-1) 5            5-7                    

    





[facility] RockUrine Leukocyte Pvcujizz7510-14-79 
20:25:00* 



             Test Item    Value        Reference Range Interpretation Comments

 

             Urine Leukocyte Esterase (test code = 5799-2) 1+           NEGATIVE

     H             





[facility] RockUrine Lcwbmbs2801-20-06 20:25:00* 



             Test Item    Value        Reference Range Interpretation Comments

 

             Urine Nitrite (test code = 03435-7) POSITIVE     NEGATIVE     H    

         





[facility] RockUrine Gzgsuxx9549-91-10 20:25:00* 



             Test Item    Value        Reference Range Interpretation Comments

 

             Urine Protein (test code = 5804-0) TRACE        NEGATIVE     H     

        





[facility] RockUrine Glucose (UA)2018 20:25:00* 



             Test Item    Value        Reference Range Interpretation Comments

 

             Urine Glucose (UA) (test code = 2349-9) NEGATIVE     NEGATIVE      

             





[facility] RockUrine Ergahwi1189-98-75 20:25:00* 



             Test Item    Value        Reference Range Interpretation Comments

 

             Urine Ketones (test code = 71256-5) NEGATIVE     NEGATIVE          

         





[facility] RockUrine Bclkvqlimhdc6562-08-94 20:25:00* 



             Test Item    Value        Reference Range Interpretation Comments

 

             Urine Urobilinogen (test code = 79544-7) 0.2          0.2-1        

              





[facility] RockUrine Qhxleczsd3659-11-08 20:25:00* 



             Test Item    Value        Reference Range Interpretation Comments

 

             Urine Bilirubin (test code = 1978-6) NEGATIVE     NEGATIVE         

          





[facility] RockUrine Cuqjs4530-52-85 20:25:00* 



             Test Item    Value        Reference Range Interpretation Comments

 

             Urine Blood (test code = 88132-4) 4+           NEGATIVE     H      

       





[facility] RockInfluenza Virus Types A,B Antigen
2018 19:43:00* 



             Test Item    Value        Reference Range Interpretation Comments

 

             Influenza Virus Types A,B Antigen (test code = 30404-4) NEGATIVE   

  NEGATIVE                   





[facility] RockInfluenza Virus Types A,B Antigen
2018 19:43:00* 



             Test Item    Value        Reference Range Interpretation Comments

 

             Influenza Virus Types A,B Antigen (test code = 12323-2) NEGATIVE   

  NEGATIVE                   





[facility] RockInfluenza Virus Types A,B Antigen
2018 19:43:00* 



             Test Item    Value        Reference Range Interpretation Comments

 

             Influenza Virus Types A,B Antigen (test code = 49910-3) NEGATIVE   

  NEGATIVE                   





[facility] RockBasophils % (Manual)2018 11:20:00* 



             Test Item    Value        Reference Range Interpretation Comments

 

             Basophils % (Manual) (test code = 70850-6) 1            0-1.5      

                





[facility] RockReactive Bkcpremppup2584-95-92 11:20:00* 



             Test Item    Value        Reference Range Interpretation Comments

 

             Reactive Lymphocytes (test code = 80236-3) 1                       

                





[facility] RockBasophils % (Manual)2018 11:20:00* 



             Test Item    Value        Reference Range Interpretation Comments

 

             Basophils % (Manual) (test code = 52539-0) 1            0-1.5      

                





[facility] RockReactive Spdjmathbyb8504-35-48 11:20:00* 



             Test Item    Value        Reference Range Interpretation Comments

 

             Reactive Lymphocytes (test code = 63656-1) 1                       

                





[facility] RockBasophils % (Manual)2018 11:20:00* 



             Test Item    Value        Reference Range Interpretation Comments

 

             Basophils % (Manual) (test code = 30983-5) 1            0-1.5      

                





[facility] RockReactive Xnanwznpycf5013-49-50 11:20:00* 



             Test Item    Value        Reference Range Interpretation Comments

 

             Reactive Lymphocytes (test code = 84561-3) 1                       

                





[facility] RockUrine Uric Acid Iqhryzfs8710-50-48 
11:06:00* 



             Test Item    Value        Reference Range Interpretation Comments

 

             Urine Uric Acid Crystals (test code = 5817-2) MANY         FEW     

     H             





[facility] RockUrine Uric Acid Bpdqtxxj6864-35-86 
11:06:00* 



             Test Item    Value        Reference Range Interpretation Comments

 

             Urine Uric Acid Crystals (test code = 5817-2) MANY         FEW     

     H             





[facility] Rockp-ANCA Titer2018-01-15 21:20:00* 



             Test Item    Value        Reference Range Interpretation Comments

 

             p-ANCA Titer (test code = 94697-7) -1:20        Neg:<1:20          

        





The presence of positive fluorescence exhibiting P-ANCA orC-ANCA patterns alone 
is not specific for the diagnosis ofWegener's Granulomatosis (WG) or microscopic
polyangiitis.Decisions about treatment should not be based solely onANCA IFA brayden franz.  The [employer] Consensusrecommends follow up testing of po
sitive sera with both AL-3 and MPO-ANCA enzyme immunoassays. As many as 5% serum
samples are positive only by EIA. Ref. AM J Clin Oarxdb5071;111:507-513.[facility] Rockc-ANCA Titer2018-01-15 21:20:00* 



             Test Item    Value        Reference Range Interpretation Comments

 

             c-ANCA Titer (test code = 04331-7) -1:20        Neg:<1:20          

        





[facility] RockAtypical i-SAXZ1450-31-15 21:20:00* 



             Test Item    Value        Reference Range Interpretation Comments

 

             Atypical p-ANCA (test code = 32502-8) -1:20        Neg:<1:20       

           





The atypical pANCA pattern has been observed in asignificant percentage of patie
nts with ulcerative colitis,primary sclerosing cholangitis and autoimmune hepati
tis.Performed at:  [facility] [place] [id number] Director: Ruddy Stout MD, Phone:  [phone] Round Rockp-ANCA Titer2018-01-15 21:20:00* 



             Test Item    Value        Reference Range Interpretation Comments

 

             p-ANCA Titer (test code = 22489-5) -1:20        Neg:<1:20          

        





The presence of positive fluorescence exhibiting P-ANCA orC-ANCA patterns alone 
is not specific for the diagnosis ofWegener's Granulomatosis (WG) or microscopic
polyangiitis.Decisions about treatment should not be based solely onANCA IFA re
sults.  The [employer] Consensusrecommends follow up testing of po
sitive sera with both AL-3 and MPO-ANCA enzyme immunoassays. As many as 5% serum
samples are positive only by EIA. Ref. AM J Clin Yxbtfs3888;111:507-513.[facility] Rockc-ANCA Titer2018-01-15 21:20:00* 



             Test Item    Value        Reference Range Interpretation Comments

 

             c-ANCA Titer (test code = 50520-9) -1:20        Neg:<1:20          

        





[facility] RockAtypical l-[id number] 21:20:00* 



             Test Item    Value        Reference Range Interpretation Comments

 

             Atypical p-ANCA (test code = 69434-6) -1:20        Neg:<1:20       

           





The atypical pANCA pattern has been observed in asignificant percentage of patie
nts with ulcerative colitis,primary sclerosing cholangitis and autoimmune hepati
tis.Performed at:  [facility]  [address]Lab Director: Ruddy Stout MD, Phone:  [phone] Scott & [facility] – Round Rockp-ANCA Titer2018-01-15 21:20:00* 



             Test Item    Value        Reference Range Interpretation Comments

 

             p-ANCA Titer (test code = 48993-2) -1:20        Neg:<1:20          

        





The presence of positive fluorescence exhibiting P-ANCA orC-ANCA patterns alone 
is not specific for the diagnosis ofWegener's Granulomatosis (WG) or microscopic
polyangiitis.Decisions about treatment should not be based solely onANCA IFA re
sults.  The [employer] Consensusrecommends follow up testing of po
sitive sera with both AL-3 and MPO-ANCA enzyme immunoassays. As many as 5% serum
samples are positive only by EIA. Ref. AM J Clin Cefbge5309;111:507-513.[facility] Rockc-ANCA Titer2018-01-15 21:20:00* 



             Test Item    Value        Reference Range Interpretation Comments

 

             c-ANCA Titer (test code = 76290-0) -1:20        Neg:<1:20          

        





[facility] RockAtypical o-FEOK9652-12-15 21:20:00* 



             Test Item    Value        Reference Range Interpretation Comments

 

             Atypical p-ANCA (test code = 92143-4) -1:20        Neg:<1:20       

           





The atypical pANCA pattern has been observed in asignificant percentage of patie
nts with ulcerative colitis,primary sclerosing cholangitis and autoimmune hepati
tis.Performed at:  [address]  [address]Lab Director: Ruddy Stout MD, Phone:  [phone] Round RockPoikilocytosis2018-01-12 21:02:00* 



             Test Item    Value        Reference Range Interpretation Comments

 

             Poikilocytosis (test code = 779-9) MODERATE                        

        





[facility] RockTear Drop Lgrmy4297-46-75 21:02:00* 



             Test Item    Value        Reference Range Interpretation Comments

 

             Tear Drop Cells (test code = 7791-7) FEW                           

          





[facility] RockElliptocytes2018-01-12 21:02:00* 



             Test Item    Value        Reference Range Interpretation Comments

 

             Elliptocytes (test code = 87472-0) MODERATE                        

        





[facility] RockPoikilocytosis2018-01-12 21:02:00* 



             Test Item    Value        Reference Range Interpretation Comments

 

             Poikilocytosis (test code = 779-9) MODERATE                        

        





[facility] [facility] RockTear Drop Xcnjs3626-06-90 21:02:00* 



             Test Item    Value        Reference Range Interpretation Comments

 

             Tear Drop Cells (test code = 7791-7) FEW                           

          





[facility] RockElliptocytes2018-01-12 21:02:00* 



             Test Item    Value        Reference Range Interpretation Comments

 

             Elliptocytes (test code = 10509-4) MODERATE                        

        





[facility] RockPoikilocytosis2018-01-12 21:02:00* 



             Test Item    Value        Reference Range Interpretation Comments

 

             Poikilocytosis (test code = 779-9) MODERATE                        

        





[facility] RockTear Drop Bveok3107-09-40 21:02:00* 



             Test Item    Value        Reference Range Interpretation Comments

 

             Tear Drop Cells (test code = 7791-7) FEW                           

          





[facility] RockElliptocytes2018-01-12 21:02:00* 



             Test Item    Value        Reference Range Interpretation Comments

 

             Elliptocytes (test code = 05483-1) MODERATE                        

        





[facility] RockErythrocyte Sedimentation Baul6145-88-43 
13:10:00* 



             Test Item    Value        Reference Range Interpretation Comments

 

             Erythrocyte Sedimentation Rate (test code = 4537-7) 16           0-

20                       





[facility] RockErythrocyte Sedimentation Kljv0494-55-87 
13:10:00* 



             Test Item    Value        Reference Range Interpretation Comments

 

             Erythrocyte Sedimentation Rate (test code = 4537-7) 16           0-

20                       





[facility] RockC-Reactive Sjazwfr3964-56-19 10:22:00* 



             Test Item    Value        Reference Range Interpretation Comments

 

             C-Reactive Protein (test code = 1988-5) 110.5        0.0-4.9      H

             





Performed at:   - [facility]  [id number]
Director: Ha Mcgregor MD, Phone:  [phone] Scott & [facility] Round RockC-Reactive Zyiegwp4542-48-53 10:22:00* 



             Test Item    Value        Reference Range Interpretation Comments

 

             C-Reactive Protein (test code = 1988-5) 110.5        0.0-4.9      H

             





Performed at:  [facility]  [id number]
Director: Ha Mcgregor MD, Phone:  [phone] Scott & [facility] Round RockUrine Gpgegxo4182-10-73 08:22:00* 



             Test Item    Value        Reference Range Interpretation Comments

 

             Urine Culture (test code = 630-4) Organism: ESCHERICHIA COLI       

                     





[facility] Cjpglpd6974-00-35 08:22:00* 



             Test Item    Value        Reference Range Interpretation Comments

 

             Urine Culture (test code = 630-4) Organism: ESCHERICHIA COLI       

                     





[facility] Egmluil7379-27-37 08:22:00* 



             Test Item    Value        Reference Range Interpretation Comments

 

             Urine Culture (test code = 630-4) Organism: ESCHERICHIA COLI       

                     





[facility] RockClostridium Difficile Toxin A & B
2018 13:01:00* 



             Test Item    Value        Reference Range Interpretation Comments

 

             Clostridium Difficile Toxin A & B (test code = 531370782) NEGATIVE 

    NEGATIVE                   





Testing on stool aspirate specimens is outside  claims since specime
n type not validated on this assay.[facility] RockUrine 
Opiates Screen2018-01-10 08:37:00* 



             Test Item    Value        Reference Range Interpretation Comments

 

             Urine Opiates Screen (test code = 42222-4) NEGATIVE     NEGATIVE   

                





[facility] RockUrine Barbiturates Screen2018-01-10 
08:37:00* 



             Test Item    Value        Reference Range Interpretation Comments

 

             Urine Barbiturates Screen (test code = 477858015) NEGATIVE     NEGA

TIVE                   





[facility] RockUrine Phencyclidine Screen2018-01-10 
08:37:00* 



             Test Item    Value        Reference Range Interpretation Comments

 

             Urine Phencyclidine Screen (test code = 34680-8) POSITIVE     NEGAT

NILAY     H             





This test provides only a screen. Positive results should be repeated by a confi
rmatory test.[facility] RockUrine Amphetamines Screen
2018-01-10 08:37:00* 



             Test Item    Value        Reference Range Interpretation Comments

 

             Urine Amphetamines Screen (test code = 77426-1) NEGATIVE     NEGATI

VE                   





[facility] RockUrine Benzodiazepines Screen2018-01-10 
08:37:00* 



             Test Item    Value        Reference Range Interpretation Comments

 

             Urine Benzodiazepines Screen (test code = 66393-2) POSITIVE     NEG

ATIVE     H             





This test provides only a screen. Positive results should be repeated by a confi
rmatory test.[facility] RockUrine Cocaine Screen
2018-01-10 08:37:00* 



             Test Item    Value        Reference Range Interpretation Comments

 

             Urine Cocaine Screen (test code = 3398-5) NEGATIVE     NEGATIVE    

               





[facility] RockUrine Cannabinoids Screen2018-01-10 
08:37:00* 



             Test Item    Value        Reference Range Interpretation Comments

 

             Urine Cannabinoids Screen (test code = 94219-2) NEGATIVE     NEGATI

VE                   





***THESE RESULTS ARE FOR MEDICAL TREATMENT ONLY****THIS REPORT CONTAINS UNCONFIR
MED SCREENING RESULTS*POSITIVE RESULTS WILL BE CONFIRMED BY REFERENCE LAB UPON R
EQUEST                           CUT-OFFDRUG CLASS              CONCENTRATION   
                       ng/mLAmphetamines               1000Methamphetamines     
     1000Cocaine                     300Opiate                      300Phencyc
lidine                25Cannabinoid                  50Barbiturates             
  300Benzodiazepine              300Methadone                   300[facility] CenterU[facility] Opiates Screen2018-01-10 08:37:00* 



             Test Item    Value        Reference Range Interpretation Comments

 

             Urine Opiates Screen (test code = 58200-9) NEGATIVE     NEGATIVE   

                





[facility] RockUrine Barbiturates Screen2018-01-10 
08:37:00* 



             Test Item    Value        Reference Range Interpretation Comments

 

             Urine Barbiturates Screen (test code = 653535078) NEGATIVE     NEGA

TIVE                   





[facility] RockUrine Phencyclidine Screen2018-01-10 
08:37:00* 



             Test Item    Value        Reference Range Interpretation Comments

 

             Urine Phencyclidine Screen (test code = 68670-1) POSITIVE     NEGAT

NILAY     H             





This test provides only a screen. Positive results should be repeated by a confi
rmatory test.[facility] RockUrine Amphetamines Screen
2018-01-10 08:37:00* 



             Test Item    Value        Reference Range Interpretation Comments

 

             Urine Amphetamines Screen (test code = 11257-9) NEGATIVE     NEGATI

VE                   





[facility] RockUrine Benzodiazepines Screen2018-01-10 
08:37:00* 



             Test Item    Value        Reference Range Interpretation Comments

 

             Urine Benzodiazepines Screen (test code = 53987-7) POSITIVE     NEG

ATIVE     H             





This test provides only a screen. Positive results should be repeated by a confi
rmatory test.[facility] RockUrine Cocaine Screen
2018-01-10 08:37:00* 



             Test Item    Value        Reference Range Interpretation Comments

 

             Urine Cocaine Screen (test code = 3398-5) NEGATIVE     NEGATIVE    

               





[facility] RockUrine Cannabinoids Screen2018-01-10 
08:37:00* 



             Test Item    Value        Reference Range Interpretation Comments

 

             Urine Cannabinoids Screen (test code = 08424-0) NEGATIVE     NEGATI

VE                   





***THESE RESULTS ARE FOR MEDICAL TREATMENT ONLY****THIS REPORT CONTAINS UNCONFIR
MED SCREENING RESULTS*POSITIVE RESULTS WILL BE CONFIRMED BY REFERENCE LAB UPON R
EQUEST                           CUT-OFFDRUG CLASS              CONCENTRATION   
                       ng/mLAmphetamines               1000Methamphetamines     
     1000Cocaine                     300Opiate                      300Phencyc
lidine                25Cannabinoid                  50Barbiturates             
  300Benzodiazepine              300Methadone                   300[facility] CenterUrine Opiates Screen2018-01-10 08:37:00* 



             Test Item    Value        Reference Range Interpretation Comments

 

             Urine Opiates Screen (test code = 96836-7) NEGATIVE     NEGATIVE   

                





[facility] RockUrine Barbiturates Screen2018-01-10 
08:37:00* 



             Test Item    Value        Reference Range Interpretation Comments

 

             Urine Barbiturates Screen (test code = 389909875) NEGATIVE     NEGA

TIVE                   





[facility] RockUrine Phencyclidine Screen2018-01-10 
08:37:00* 



             Test Item    Value        Reference Range Interpretation Comments

 

             Urine Phencyclidine Screen (test code = 69045-0) POSITIVE     NEGAT

NILAY     H             





This test provides only a screen. Positive results should be repeated by a confi
rmatory test.[facility] RockUrine Amphetamines Screen
2018-01-10 08:37:00* 



             Test Item    Value        Reference Range Interpretation Comments

 

             Urine Amphetamines Screen (test code = 48206-3) NEGATIVE     NEGATI

VE                   





[facility] Rock[facility] Benzodiazepines Screen2018-01-10 
08:37:00* 



             Test Item    Value        Reference Range Interpretation Comments

 

             Urine Benzodiazepines Screen (test code = 65201-3) POSITIVE     NEG

ATIVE     H             





This test provides only a screen. Positive results should be repeated by a confi
rmatory test.[facility] RockUrine Cocaine Screen
2018-01-10 08:37:00* 



             Test Item    Value        Reference Range Interpretation Comments

 

             Urine Cocaine Screen (test code = 3398-5) NEGATIVE     NEGATIVE    

               





[facility] Rock[facility]rine Cannabinoids Screen2018-01-10 
08:37:00* 



             Test Item    Value        Reference Range Interpretation Comments

 

             Urine Cannabinoids Screen (test code = 47873-0) NEGATIVE     NEGATI

VE                   





***THESE RESULTS ARE FOR MEDICAL TREATMENT ONLY****THIS REPORT CONTAINS UNCONFIR
MED SCREENING RESULTS*POSITIVE RESULTS WILL BE CONFIRMED BY REFERENCE LAB UPON R
EQUEST                           CUT-OFFDRUG CLASS              CONCENTRATION   
                       ng/mLAmphetamines               1000Methamphetamines     
     1000Cocaine                     300Opiate                      300Phencyc
lidine                25Cannabinoid                  50Barbiturates             
  300Benzodiazepine              300Methadone                   300[facility] Centerol Occult Blood2018-01-10 05:48:00* 



             Test Item    Value        Reference Range Interpretation Comments

 

             Stool Occult Blood (test code = 2335-8) POSITIVE     NEGATIVE     [facility] Occult Blood2018-01-10 05:48:00* 



             Test Item    Value        Reference Range Interpretation Comments

 

             Stool Occult Blood (test code = 2335-8) POSITIVE     NEGATIVE     [facility] Occult Blood2018-01-10 05:48:00* 



             Test Item    Value        Reference Range Interpretation Comments

 

             Stool Occult Blood (test code = 2335-8) POSITIVE     NEGATIVE     [facility] 
04:55:00* 



             Test Item    Value        Reference Range Interpretation Comments

 

             B-Type Natriuretic Peptide (test code = 30165-8) 95.6         0-100

                      





[facility] RockB[facility] Natriuretic Peptide2018-01-10 
04:55:00* 



             Test Item    Value        Reference Range Interpretation Comments

 

             B-Type Natriuretic Peptide (test code = 04719-5) 95.6         0-100

                      





[facility] RockB[facility] Natriuretic Peptide2018-01-10 
04:55:00* 



             Test Item    Value        Reference Range Interpretation Comments

 

             B-Type Natriuretic Peptide (test code = 38106-0) 95.6         0-100

                      





[facility] RockAmylase Level2018-01-10 04:51:00* 



             Test Item    Value        Reference Range Interpretation Comments

 

             Amylase Level (test code = 1798-8) 51                        

        





[facility] RockLipase2018-01-10 04:51:00* 



             Test Item    Value        Reference Range Interpretation Comments

 

             Lipase (test code = 3040-3) 17           8-78                      

 





[facility] RockAmylase Level2018-01-10 04:51:00* 



             Test Item    Value        Reference Range Interpretation Comments

 

             Amylase Level (test code = 1798-8) 51                        

        





[facility] RockAm[facility] Level2018-01-10 04:51:00* 



             Test Item    Value        Reference Range Interpretation Comments

 

             Amylase Level (test code = 1798-8) 51                        

        





[facility] RockCHEMISTRY2011-09-15 01:45:009.0[facility] 
BzivaxgLVQYEIXLY8500-58-54 01:45:000.7[facility] VxksjwzFJAICJUOQ5997-99-30 
01:45:88475.0[facility] QtluevnLNKTTHHLG9826-63-21 01:45:08049.0[facility] Beto
DEPRSPQCE4474-65-43 01:45:003.6[facility] RvplgakTPJOUHGCB8651-37-43 01:45:0055.0
[facility] BykbozuIYWXZOVPM8438-14-94 01:45:000.3[facility] HermannCHEMISTRY
2011-09-15 01:45:67562.0[facility] FynqrraVCKIKIZSP7751-57-88 01:45:0012.0[facility]
TnvdyifASERUYICI6986-37-54 01:45:0025.0[facility] BglslxkZPQJYIYHQ7263-88-27 
01:45:003.0[facility] VaowdajZVSVBUHQV2811-81-39 01:45:0014.0[facility]
TSFURCLLU6469-78-69 01:45:008.2[facility] WakktmaMMHERGQLI3147-42-72 01:45:007.0
[facility] LwaxwerWOHQJNFVE0243-53-66 01:45:004.0[facility] HermannCHEMISTRY
2011-09-15 01:45:00* 



             Test Item    Value        Reference Range Interpretation Comments

 

             A/G Ratio (test code = A/G Ratio) 0.8 1        0.7-1.6      N      

       





[facility] HermannCHEMISTRY2011-09-15 01:45:00* 



             Test Item    Value        Reference Range Interpretation Comments

 

             B/C Ratio (test code = B/C Ratio) 13.0 1       6-25         N      

       





[facility] MnhfcxfQVQXHVBXN3908-11-88 01:45:0012.6[facility] HermannHEMATOLOGY
2011-09-15 01:45:00* 



             Test Item    Value        Reference Range Interpretation Comments

 

             INR (test code = INR) 1.04 1       0.85-1.17    N             





[facility] BhkcuurBUICZIJUKW0164-50-71 01:45:00* 



             Test Item    Value        Reference Range Interpretation Comments

 

             PT (test code = PT) 13.6 s       12.0-14.7    N             





[facility] HgnnpdhWWPFHCIRKF3068-84-87 01:45:00* 



             Test Item    Value        Reference Range Interpretation Comments

 

             PTT (test code = PTT) 27.5 s       22.9-35.8    N             





[facility] EshdktpUTRKCSQZBZ9547-85-35 01:45:009.3[facility] HermannHEMATOLOGY
2011-09-15 01:45:0081.2[facility] XbdraewDLBEHZLOQN3313-65-19 01:45:0028.7[facility]
UxaoqdrMQMOAEGEAJ4598-82-54 01:45:0032.4[facility] AzhfoxwBKTUZHCMES6353-67-41 
01:45:00* 



             Test Item    Value        Reference Range Interpretation Comments

 

             MCH (test code = MCH) 26.3 pg      27.0-31.0    L             





[facility] HdtsymgHLSIAAJRKO4232-08-57 01:45:0018.8[facility] HermannHEMATOLOGY
2011-09-15 01:45:007.6[facility] QiyrvsnHMOKSBDFDP5218-74-46 01:45:08456.0[facility]
WjbimzqHPVWHCPPKI3352-36-44 01:45:007.3[facility] HigbwcaURPHDYWNJJ0378-34-20 
01:45:003.54[facility] RbygbseMGLEVZKKQC0884-90-72 01:45:000.0[facility]
FYQWYDIJVV0541-48-21 01:45:000.4[facility]annHEMATOLOGY2011-09-15 01:45:000.0
[facility] YbrawupVACKXQMCOW3364-61-11 01:45:0077.9[facility]annHEMATOLOGY
2011-09-15 01:45:000.7[facility] MgzkafzHRJBOTFLWA2177-98-56 01:45:005.7[facility] 
ZcpnvxaOIHMHMFTYB5033-38-27 01:45:000.7[facility] DwodfvkJWQOICQNBX7237-06-59 
01:45:001.1[facility] DvqwbknEFHQKCCZZK0003-42-15 01:45:005.7[facility]
KMUBLNDVKB3937-64-51 01:45:0015.0[facility] XfiqjkrNWAVRNEAEZ8884-24-29 01:45:00??
[facility] IeoplwaISLAXUJUGO1540-56-16 01:45:00Small *ABN*(2011 20:45:00) ??
[facility] HermannURINALYSIS2011-09-15 01:45:00Negative (2011 20:45:00) ??
[facility] OwnjmmxQWSIVREKPA8546-92-87 01:45:001.0[facility] HermannURINALYSIS
2011-09-15 01:45:004.0[facility] JeqinzxKYAAKFKLUU9179-49-55 01:45:00Many /LPF 
*ABN*(2011 20:45:00) ??[facility] HermannURINALYSIS2011-09-15 01:45:00
Occasional /HPF *NA*(2011 20:45:00) ??[facility] HermannURINALYSIS2011-09-15
01:45:00Few /LPF *NA*(2011 20:45:00) ??[facility] HermannURINALYSIS
2011-09-15 01:45:00Negative *NA*(2011 20:45:00) ??[facility]
LSIYQNJCFI1113-56-52 01:45:00Negative (2011 20:45:00) ??[facility]
WFENAFHTTL2888-29-06 01:45:00Negative mg/dL *NA*(2011 20:45:00) ??[facility]
HermannURINALYSIS2011-09-15 01:45:00Slight *ABN*(2011 20:45:00) ??[facility] 01:45:00* 



             Test Item    Value        Reference Range Interpretation Comments

 

             UA Spec Grav (test code = UA Spec Grav) 1.02 1                    N

             





[facility]UR[facility]2011-09-15 01:45:00Yellow *NA*(2011 20:45:00) ??
[facility]UR[facility]IS2011-09-15 01:45:00* 



             Test Item    Value        Reference Range Interpretation Comments

 

             UA pH (test code = UA pH) 6.5 1        5.0-8.0      N             





[facility]UR[facility]2011-09-15 01:45:00Negative mg/dL (2011 
20:45:00) ??[facility]UR[facility]IS2011-09-15 01:45:00Negative mg/dL 
*NA*(2011 20:45:00) ??[facility]ann[facility] W    [place] [address]      Patient Name: MORENO PAINTING   MR #: [id number]    : 1940 
Age/Sex: 77/F  Acct #: [id number] Req #: [id number]  Adm Physician:     
Ordered by: BELEN ZHANG MD  Report #: [id number]   Location: ER  Room/Bed:
    ______________________________________________________________________
_____________________________    Procedure: [id number] CT/CT ABDOMEN/PELVIS W  Ex
am Date: 18                            Exam Time: 1340       REPORT STATUS
: Signed    PROCEDURE: CT ABDOMEN AND PELVIS WITH CONTRAST       TECHNIQUE:    T
he abdomen and pelvis were scanned utilizing a multidetector helical    scanner 
from the diaphragm to the lesser trochanter after the IV    administration of 10
0 cc of Isovue 370 and the oral administration of    water.  Coronal and sagitta
l multiplanar reformations were obtained.       COMPARISON: Patients [facility], CT, CT ABDOMEN/PELVIS W,    2015, 23:02.  Patients [facility], CT,
CT ABDOMEN/PELVIS W,    1/10/2018, 5:09.       INDICATIONS:   ABDOMEN PAIN      
 FINDINGS:   LOWER THORAX: Stable moderate hiatal hernia. Mild atelectatic lucho
nges    in bilateral lower lobes. Mild cardiomegaly.       HEPATOBILIARY: No foc
al hepatic lesions. Stable mild central    intrahepatic biliary ductal dilatatio
n and mild dilation of the common    bile duct. No intraluminal filling defects.
Cholelithiasis. No wall    thickening or pericholecystic fluid..   SPLEEN: No s
plenomegaly.   PANCREAS: No focal masses or ductal dilatation. 2.4 x 3.0 cm duod
enal    diverticulum between the second portion of the duodenum and pancreatic  
 head (series 2, image 35).       ADRENALS: No adrenal nodules.   KIDNEYS/URETE
RS: No hydronephrosis, stones, or solid mass lesions.   PELVIC ORGANS/BLADDER: M
oderately distended bladder, without focal    lesions. Uterus is not visualized.
No adnexal masses.       PERITONEUM / RETROPERITONEUM: Trace free fluid in the 
pelvic    cul-de-sac.   LYMPH NODES: Enlarged mesenteric node, measuring 1.1 cm 
in short axis    (series 2, image 52). There are multiple prominent mesenteric n
odes,    which measure 0.8-0.9 cm in short axis (for example series 2, images   
55, 51, 57 and, 59).    VESSELS: Moderate calcification of the aorta and iliac 
vessels. Celiac    trunk, superior and inferior mesenteric, and bilateral renal 
arteries    are patent. Portal, superior mesenteric, and splenic veins are paten
t.       GI TRACT: Multiple loops of distal ileum, extending to the terminal    
ileum show moderate to marked wall thickening with mucosal enhancement,    incre
ased vascularity and mild surrounding stranding, similar in    appearance to darius
or CT dated 1/10/2018, 2015 and 2016. No foci    of extraluminal air or
well-defined enhancing fluid collections.    Duodenum and jejunum show no wall 
thickening. Large bowel is    unremarkable. No bowel obstruction or dilation.   
   BONES AND SOFT TISSUES: Unremarkable.       IMPRESSION:       1. Findings co
nsistent with enteritis involving the ilium and extending    to the terminal ile
um. Given the recurrent nature of the findings, as    noted on prior CTs, inflam
matory bowel disease (particularly Crohn's)    is a primary consideration, despi
te the patient's advanced age. No    evidence of perforation or abscess formatio
n.   2. Prominent and single enlarged mesenteric nodes, likely reactive.   3. St
able mild central intrahepatic biliary ductal dilation and mild    dilation of t
he common bile duct. Cholelithiasis, without CT evidence    of cholecystitis.   
4. Stable moderate hiatal hernia.               Maxwell Robb M.D.     Dict
ated by:  Maxwell Robb M.D. on 2018 at 15:00        Electronically ap
proved by:  Maxwell Robb M.D. on 2018 at    15:00                Dict
ated By: MAXWELL ROBB MD  Electronically Signed By: MAXWELL ROBB MD on  1500  Transcribed By: ROBERTO on 18 1500       COPY TO:   DIXON ZHANG MD        IR CONSULT    [place] [address]      Patient Name: MORENO PAINTING  
MR #: [id number]    : 1940 Age/Sex: 77/F  Acct #: [id number] Req #: 
[id number]  Adm Physician: JOSH ALLISON MD    Ordered by: SCAR GIRON MD  
Report #: [id number]   Location: MED/SURG3  [facility][note 3]    
________________________________________________
___________________________________________________    Procedure: [id number] DX/I
R CONSULT  Exam Date:                             Exam Time:        REPORT STATU
S: Signed    Non-tunneled Central Venous Catheter Revision 2018      Pre-Pro
cedure Diagnosis: Malpositioned right subclavian central venous   catheter.   Po
st-procedure Diagnosis:Malpositioned right subclavian central venous catheter   
  : GUSTAVO Stout   Assistant: None   Sedation: None. 1% lidocaine local
anesthesia.      Radiation Dose:1.12 mGy (cumulative air kerma)   Fluoroscopy 
time:0.3 minutes      Estimate blood loss: <5 mL Blood administered: None   
Complications: None   Implants/Grafts: 16 cm 7-French 3 lumen CVC   Specimen: 
None         Procedure:   Informed consent was obtained and the patient 
positioned supine in the   fluoroscopy suite. The indwelling subclavian central 
venous catheter was   prepped and draped in standard fashion.      The 
indwelling catheter was removed over an 035 wire. The wire was repositioned   
from the right internal jugular vein to the superior vena cava under   
fluoroscopic guidance. A new 7-French 16 cm 3 lm central venous catheter was   
positioned at the low SVC/superior atrial-caval junction under fluoroscopy.     
At the end of the procedure the catheter was flushed, secured to the skin and a 
 sterile dressing applied. The patient tolerated the procedure well and without 
 immediate complication.      Findings:   Initial radiograph demonstrated the 
previously placed subclavian central venous   catheter was malpositioned in the 
right internal jugular vein.      Impression:   Successful repositioning/excha
nge of a non-tunneled right subclavian central   venous catheter using fluorosco
pic guidance.            This report was generated with voice-recognition techno
logy. Errors in   transcription can occur. Please interpret accordingly and cont
act a radiologist   if there are any questions regarding the report.       Ailyn
d by: Dr. Erin Stout M.D. on 2018 10:59 AM        Dictated By: ERIN FELIX MD  Electronically Signed By: ERIN STOUT MD on 18 1059  Transcr
ibed By: BILL on 18 1059       COPY TO:   SCAR GIRON MD        NON-
TUNNELLED CVC CATH REPLACE    [place] [address]      Patient Name: MORENO PAINTING  
MR #: [id number]    : 1940 Age/Sex: 77/F  Acct #: [id number] Req #: 
[id number]  Adm Physician: JOSH ALLISON MD    Ordered by: SCAR GIRON MD  
Report #: [id number]   Location: MED/SURG3  Room/Bed: [facility]    
________________________________________________
___________________________________________________    Procedure: [id number] IR/N
ON-TUNNELLED CVC CATH REPLACE  Exam Date: 18                            Ex
am Time: 0915       REPORT STATUS: Signed    Non-tunneled Central Venous Cathete
r Revision 2018      Pre-Procedure Diagnosis: Malpositioned right subclavian
central venous   catheter.   Post-procedure Diagnosis:Malpositioned right subcl
jozef central venous catheter      : GUSTAVO Stout   Assistant: None   S
edation: None. 1% lidocaine local anesthesia.      Radiation Dose:1.12 mGy (cumu
lative air kerma)   Fluoroscopy time:0.3 minutes      Estimate blood loss: <5 mL
Blood administered: None   Complications: None   Implants/Grafts: 16 cm 7-French
3 lumen CVC   Specimen: None         Procedure:   Informed consent was obtained 
and the patient positioned supine in the   fluoroscopy suite. The indwelling 
subclavian central venous catheter was   prepped and draped in standard fashion.
     The indwelling catheter was removed over an 035 wire. The wire was repo
sitioned   from the right internal jugular vein to the superior vena cava under 
 fluoroscopic guidance. A new 7-French 16 cm 3 lm central venous catheter was   
positioned at the low SVC/superior atrial-caval junction under fluoroscopy.     
At the end of the procedure the catheter was flushed, secured to the skin and a 
 sterile dressing applied. The patient tolerated the procedure well and without 
 immediate complication.      Findings:   Initial radiograph demonstrated the 
previously placed subclavian central venous   catheter was malpositioned in the 
right internal jugular vein.      Impression:   Successful repositioning/excha
nge of a non-tunneled right subclavian central   venous catheter using fluorosco
pic guidance.            This report was generated with voice-recognition techno
logy. Errors in   transcription can occur. Please interpret accordingly and cont
act a radiologist   if there are any questions regarding the report.       Ailyn
d by: Dr. Erin Stout M.D. on 2018 10:59 AM        Dictated By: ERIN FELIX MD  Electronically Signed By: ERIN STOUT MD on 18  Transcr
ibed By: BILL on 18       COPY TO:   SCAR GIRON MD        CHEST
SINGLE (PORTABLE)    [place] [address]      Patient Name: MORENO PAINTING   MR #: 
[id number]    : 1940 Age/Sex: 77/F  Acct #: [id number] Req #: [id number]
[id number]  Adm Physician: JOSH ALLISON MD    Ordered by: SCAR GIRON MD  Report 
#: [id number]   Location: ICU  Room/Bed: [place] [address]    
__________________________________________________
_________________________________________________    Procedure: [id number] DX/JONATHAN
ST SINGLE (PORTABLE)  Exam Date:                             Exam Time:        R
EPORT STATUS: Signed    CHEST SINGLE (PORTABLE), 2018 12:54 AM      Techniqu
e: CHEST SINGLE (PORTABLE)   Comparison: Previous day   Clinical history:  S RIG
HT SC CENTRAL LINE PLACEMENT      Findings:   See Impression      Impression:   
1. Lines/Tubes: Right subclavian central venous catheter placement with tip   ov
erlying the jugular vein; recommend repositioning.   2. Otherwise stable chest. 
No pneumothorax.      Signed by: Dr Tejas Perez MD on 2018 1:30 AM       
Dictated By: TEJAS PEREZ MD  Electronically Signed By: TEJAS PEREZ MD on
18  Transcribed By: BILL on 18       COPY TO:   SCAR GIRON MD        CT BRAIN WO    [address], [place], [place] [address]      Patient Name: 
MORENO PAINTING   MR #: [id number]    : 1940 Age/Sex: 77/F  Acct #: 
[id number] Req #: [id number]  Adm Physician:     Ordered by: RUSTY PERLA NP  Report #: [id number]   Location: ER  Room/Bed:     
________________________________________________________________________
___________________________    Procedure: [id number] CT/CT BRAIN WO  Exam Date:  
                          Exam Time:        REPORT STATUS: Signed    EXAMINATIO
N: Head CT       HISTORY: Status post fall, dizziness, weakness, fever   COMPARI
SON: Head CT on 3/20/2018   TECHNIQUE: Multidetector axial images were obtained 
without contrast from the   foramen magnum to the vertex . The images were recon
structed using brain and   bone algorithms.  Thin section brain images were refo
rmatted into coronal and   sagittal planes.   Intravenous contrast: None.    Mot
ion/streaking artifact limits the evaluation of the skull base and posterior   c
ranial fossa.      FINDINGS:           Parenchyma:    1.  Persistent moderate co
nfluent chronic microvascular ischemic changes in the   supratentorial white mat
ter.   2.  No mass or hemorrhage. No CT evidence of acute territorial vascular i
nsult.                  Extra-axial spaces:No abnormal density. No extra-axial f
luid collections         Brain volume: Normal for age.         Ventricles: No hy
drocephalus or displacement.          Arteries: No density suggestive of thrombu
s.         Dural sinuses: No abnormal density.         Extra-axial spaces: No ab
normal density.         Foramen magnum: No mass, Chiari malformation, or basilar
invagination.         Sella: No obvious mass.          Paranasal/mastoid sinuse
s: Imaged portions unremarkable.         Skull/Scalp: Unchanged previously descr
ibed bilateral sphenoid   demineralization with lytic changes, no associated sof
t tissue mass           IMPRESSION:      1.  No acute intracranial hemorrhage or
cortical infarct.      2.  Moderate chronic microvascular ischemic changes, sta
ble when compared to   recent head CTs on 3/30/2018 and 1/10/2018      Signed by
: Dr. Bethany Hernandez M.D. on 3/31/2018 7:22 PM        Dictated By: BETHANY HERNANDEZ MD 
Electronically Signed By: BETHANY HERNANDEZ MD on 18  Transcribed By: CHIDI CARMICHAEL on 18       COPY TO:   RUSTY PERLA NP        CHEST SINGLE 
(PORTABLE)    [place] [address]      Patient Name: MORENO PAINTING   MR #: 
[id number]    : 1940 Age/Sex: 77/F  Acct #: [id number] Req #: [id number]
[id number]  Adm Physician:     Ordered by: RUSTY PERLA NP  Report #: [id number]
  Location: ER  Room/Bed:     
________________________________________________________________________
___________________________    Procedure: [id number] DX/CHEST SINGLE (PORTABLE)  
Exam Date: 18                            Exam Time:        REPORT STAT
US: Signed    CHEST SINGLE (PORTABLE), 3/31/2018 6:32 PM      Technique: CHEST S
KRISTIAN (PORTABLE)   Comparison: 3/30/2018   Clinical history: Fall, weakness, fev
er      Findings:   Heart/mediastinum: Mildly enlarged cardiomediastinal silhoue
tte, accentuated by   portable technique. Aortic calcifications.  Hiatal hernia.
  Lungs/pleural spaces: No consolidation or edema. No effusion or pneumothorax. 
    Impression:   1. Lines/Tubes: None   2. No acute abnormality.      Signed 
by: Dr Tejas Perez MD on 3/31/2018 7:24 PM        Dictated By: TEJAS TATE MD  Electronically Signed By: TEJAS PEREZ MD on 18  Transcribed
By: BILL on 18       COPY TO:   RUSTY PERLA NP        CT BRAIN
WO    [facility]   [address] [place] [address]      Patient Name: MORENO PAINTING   MR #: [id number]    
: 1940 Age/Sex: 77/F  Acct #: [id number] Req #: [id number]  Adm 
Physician:     Ordered by: RADHA QUIROZ NP  Report #: [id number]   
Location: ER  Room/Bed:     
______________________________________________________________________
_____________________________    Procedure: [id number] CT/CT BRAIN WO  Exam Date:
18                            Exam Time: 1010       REPORT STATUS: Signed 
  EXAMINATION: Head CT       HISTORY: Weakness, vertigo, pain   COMPARISON: Head
CT on 1/10/2018   TECHNIQUE: Multidetector axial images were obtained without 
contrast from the   foramen magnum to the vertex . The images were reconstructed
using brain and   bone algorithms.  Thin section brain images were reformatted 
into coronal and   sagittal planes.   Intravenous contrast: None.    Motion/str
eaking artifact limits the evaluation of the skull base and posterior   cranial 
fossa.      FINDINGS:           Parenchyma:    1.  Unchanged nonspecific moderat
e confluent white matter chronic microvascular   ischemic changes.   2.  No mass
or hemorrhage. No CT evidence of acute territorial vascular insult.             
    Extra-axial spaces:No abnormal density. No extra-axial fluid collections    
    Brain volume: Normal for age.         Ventricles: No hydrocephalus or dis
placement.          Arteries: No density suggestive of thrombus.         Dural s
inuses: No abnormal density.         Extra-axial spaces: No abnormal density.   
     Foramen magnum: No mass, Chiari malformation, or basilar invagination.     
   Sella: No obvious mass.          Paranasal/mastoid sinuses: Imaged portions 
unremarkable.         Skull/Scalp: Unchanged previously described bilateral sph
enoid   demineralization with lytic changes, no associated soft tissue mass     
 IMPRESSION:      1.  No acute intracranial hemorrhage or CT evidence of acute 
territorial   cortical vascular insults.      2.  Stable moderate chronic microv
ascular ischemic changes.      3.  Unchanged nonspecific lytic lesions in the sp
henoid bone bilaterally   compared to head CT on 1/10/2018. Correlation with pas
t medical history and/or   bone scan is recommended for further evaluation if cl
inically indicated.      Signed by: Dr. Bethany Hernandez M.D. on 3/30/2018 10:58 AM 
      Dictated By: BETHANY HERNANDEZ MD  Electronically Signed By: BETHANY HERNANDEZ MD on 
18 105  Transcribed By: BILL on 18 1058       COPY TO:   RADHA QUIROZ NP        CHEST 2 VIEWS    [place] [address]      Patient Name: 
MORENO PAINTING   MR #: [id number]    : 1940 Age/Sex: 77/F  Acct #: 
[id number] Req #: [id number]  Adm Physician:     Ordered by: RADHA QUIROZ NP  Report #: [id number]   Location: ER  Room/Bed:     
______________________________________________________________________
_____________________________    Procedure: [id number] DX/CHEST 2 VIEWS  Exam Kwame
e: 18                            Exam Time: 1033       REPORT STATUS: Sign
ed    PROCEDURE:   Frontal and lateral views of the chest.       COMPARISON: Por
table chest 1/10/2018.       INDICATIONS:   WEAKNESS, RIGHT ARM PAIN           F
INDINGS:   Lines/tubes:  None.       Lungs:  The lungs are well inflated and bre
ar. There is no evidence of    pneumonia or pulmonary edema.       Pleura:  Ther
e is no pleural effusion or pneumothorax.       Heart and mediastinum:  The hear
t and the mediastinum are normal.    Atherosclerotic calcifications.       Bones
:  No acute bony abnormality. Degenerative changes of the thoracic    spine.    
  IMPRESSION:        No acute radiographic abnormality.       Dictated by:  Yogesh Perkins M.D. on 3/30/2018 at 11:32        Electronically approved by:  Keegan oscar M.D. on 3/30/2018 at 11:32                Dictated By: KEEGAN PERKINS MD  Abril
ctronically Signed By: KEEGAN PERKINS MD on 18  Transcribed By: ROBERTO on
18       COPY TO:   RADHA QUIROZ NP        SMALL BOWEL SERIES   
[place] [address]      Patient Name: MORENO PAINTING   MR #: [id number]    : 
1940 Age/Sex: 77/F  Acct #: [id number] Req #: [id number]  Adm Physician:
JOSH ALLISON MD    Ordered by: JACQUELINE JOHNSON MD  Report #: [id number]   Locatio
n: [facility]  Room/Bed: [place] [address]    _______________________________________________
____________________________________________________    Procedure: [id number] DX/
SMALL BOWEL SERIES  Exam Date: 01/10/18                            Exam Time: 14
00       REPORT STATUS: Signed    PROCEDURE:   SMALL BOWEL SERIES   COMPARISON: 
 [facility], CT, CT ABDOMEN/PELVIS W,    1/10/2018, 5:09.   INDICA
TIONS:   ENTERITIS       TEQUNIQUE: A  radiograph was performed. The patien
t was given    barium to drink and sequential images of the abdomen were obtaine
d    through 90 minutes until the contrast had reached the colon. Spot    images
of the small bowel and terminal ileum were obtained.   Fluoro time: 3.3 minutes 
     FINDINGS:   : Nonobstructive bowel gas pattern. Contrast is noted in 
the    bladder.       Small bowel:    Multiple focal outpouchings are noted in 
the duodenum, consistent with    diverticula.   Evaluation of the proximal small
bowel is limited by flocculation of    contrast.   Transit time is normal.   Th
ere are several loops of distal ileum which show long segments of    luminal red
uction and cobblestoning of the mucosa, corresponding to    previously visualize
d thickened ileum on CT dated 1/10/2018    The terminal ileum is not well-visual
ized.   No focal mass is seen.           CONCLUSION:      Findings in the distal
ileum consistent with previously visualized    enteritis on CT dated 1/10/2018. 
Inflammatory bowel disease    (particularly Crohn's) is a consideration despite 
the patient's age,    given the history of prior episodes. Infectious enteritis 
is less    likely.        Maxwell Robb M.D.     Dictated by:  Maxwell baugh M.D. on 1/10/2018 at 19:40        Electronically approved by:  Maxwell Robb M.D. on 1/10/2018 at    19:40                Dictated By: MAXWELL BROCK MD  Electronically Signed By: MAXWELL ROBB MD on 01/10/18 1940  Transcribed
By: ROBERTO on 01/10/18 1940       COPY TO:   JACQUELINE JOHNSON MD        CHEST XRAY 
LINE PLACEMENT    [place] [address]      Patient Name: MORENO PAINTING   MR #: 
[id number]    : 1940 Age/Sex: 77/F  Acct #: [id number] Req #: [id number]
[id number]  Adm Physician: JOSH ALLISON MD    Ordered by: BOB LEON MD  
Report #: [id number]   Location: [facility]  Room/Bed: [place] [address]    
____________________________________________
_______________________________________________________    Procedure: [id number] 
DX/CHEST XRAY LINE PLACEMENT  Exam Date:                             Exam Time: 
      REPORT STATUS: Signed    PROCEDURE:   A single AP view of the chest.      
COMPARISON: Chest 2 views 1/10/2018.       INDICATIONS:   CENTRAL LINE PLACEMENT
          FINDINGS:   Lines/tubes: Right internal jugular temporary central v
enous catheter    with tip projecting over the expected region of the right atri
um.       Lungs:  The lungs are well inflated and clear. There is no evidence of
   pneumonia or pulmonary edema.       Pleura:  There is no pleural effusion or 
pneumothorax.       Heart and mediastinum:  The heart and the mediastinum are u
nremarkable.    Atherosclerotic calcifications.       Bones:  No acute bony abno
rmality. Degenerative changes of the thoracic    spine.       IMPRESSION:       
No acute radiographic abnormality.       Dictated by:  Keegan Perkins M.D. on  at 8:29        Electronically approved by:  Keegan Perkins M.D. on 1/10/201
8 at 8:29                Dictated By: KEEGAN PERKINS MD  Electronically Signed By: 
KEEGAN PERKINS MD on 01/10/18 0829  Transcribed By: ROBERTO on 01/10/18 0829       CO
PY TO:   BOB LEON MD        CHEST 2 VIEWS    [place] [address]      Patient 
Name: MORENO PAINTING   MR #: [id number]    : 1940 Age/Sex: 77/F  Acct 
#: [id number] Req #: [id number]  Adm Physician:     Ordered by: SCAR GIRON MD  Report #: [id number]   Location: ER  Room/Bed:     
__________________________________________________________________________
_________________________    Procedure: [id number] DX/CHEST 2 VIEWS  Exam Date:  
                          Exam Time:        REPORT STATUS: Signed    EXAM: CHEST
2 VIEWS, PA and lateral   DATE: 1/10/2018 4:08 AM  Time stamp on exam: 0421 ho
urs   INDICATION: Nausea, vomiting   COMPARISON: None      FINDINGS:   LINES/TUB
ES: None      LUNGS: No consolidations or edema.       PLEURA: No effusions or p
neumothorax.      HEART AND MEDIASTINUM: Normal size and contour.      BONES AND
SOFT TISSUES: No acute findings. Hiatal hernia.      IMPRESSION:   No acute tho
racic abnormality.            Signed by: Dr. Cipriano Collazo M.D. on 1/10/201
8 5:05 AM        Dictated By: CIPRIANO COLLAZO MD  Electronically Signed By: CIPRIANO COLLAZO MD on 01/10/18 0505  Transcribed By: BILL on 01/10/18 0505       COPY
TO:   SCAR GIRON MD        CT ABDOMEN/PELVIS W    [place] [address]      Patient 
Name: MORENO PAINTING   MR #: [id number]    : 1940 Age/Sex: 77/F  Acct 
#: [id number] Req #: [id number]  Adm Physician:     Ordered by: SCAR GIRON MD  Report #: [id number]   Location: ER  Room/Bed:     
__________________________________________________________________________
_________________________    Procedure: [id number] CT/CT ABDOMEN/PELVIS W  Exam D
ate:                             Exam Time:        REPORT STATUS: Signed    EXAM
: CT ABDOMEN AND PELVIS with IV CONTRAST   DATE: 1/10/2018 4:08 AM  Time stamp o
n Exam: 0512 hours   INDICATION:  Abdominal pain   COMPARISON:  None available  
TECHNIQUE: The abdomen and pelvis were scanned using a multidetector helical   
scanner. Coronal and sagittal reformations were obtained. Routine protocol   per
formed.   IV Contrast: 100 cc Isovue-370   Oral Contrast: Water   CTDIvol has be
en reviewed. It is below the limits set by the Radiation Protocol   Committee (R
PC).      FINDINGS:   LOWER THORAX: No consolidations      LIVER: No masses   BI
LIARY: Cholelithiasis. No wall thickening or ductal dilation.       SPLEEN: No m
asses   PANCREAS: No masses      ADRENALS: No nodules   KIDNEYS: Symmetric perfu
ben. No enhancing masses. No hydronephrosis.      GI TRACT: Several loops of sm
all bowel with enhancing thickened walls and   adjacent vascular engorgement. No
bowel obstruction. Moderate to large hiatal   hernia. Incidental duodenal diver
ticulum.      VESSELS: Marked atherosclerotic change of the abdominal aorta and 
branches.   PERITONEUM/RETROPERITONEUM: Trace free pelvic fluid.   LYMPH NODES: 
Prominent mesenteric lymph nodes.      REPRODUCTIVE ORGANS: The uterus and ovari
es are not visualized.   BLADDER: Unremarkable      SOFT TISSUES: Unremarkable  
BONES: No suspicious bone lesions.      IMPRESSION:   Nonspecific enteritis inv
olving the distal and terminal ileum with associated   vascular congestion and m
esenteric lymphadenopathy. These findings were   described in a report of a CT o
f the abdomen and pelvis in  (images not   available for comparison), making
inflammatory enteritis (Crohn's) a likely   cause.      Signed by: Dr. Cipriano Collazo M.D. on 1/10/2018 5:44 AM        Dictated By: CIPRIANO COLLAZO MD  Elect
ronically Signed By: CIPRIANO COLLAZO MD on 01/10/18 0544  Transcribed By: BILL 
on 01/10/18 0544       COPY TO:   SCAR GIRON MD        CT BRAIN WO    [place] [address]      Patient Name: MORENO PAINTING   MR #: [id number]    : 
1940 Age/Sex: 77/F  Acct #: [id number] Req #: [id number]  Adm Physician:
    Ordered by: SCAR GIRON MD  Report #: [id number]   Location: ER  Room/Bed:
    __________________________________________________________________________
_________________________    Procedure: [id number] CT/CT BRAIN WO  Exam Date:    
                        Exam Time:        REPORT STATUS: Signed    Exam: Head CT
without contrast   History:  Nausea, vomiting, headache   Comparison studies:  
Previous head CT of 2014 is currently unavailable on   the PACS for compar
rosa.      Technique:   Axial images were obtained from the skull base to the ve
rtex.   Coronal and sagittal images reconstructed from the axial data.   Intrave
nous contrast: None      Findings:      Scalp: No abnormalities.   Bones: The sp
henoid wings are demineralized bilaterally with lytic changes. No   associated s
oft tissue mass is identified.      Brain sulci: Appropriate for age.   Ventricl
es: Normal in size and configuration. No hydrocephalus.   Extra-axial spaces: Mi
ldly prominent axial spaces of CSF density along the   right anterior temporal c
onvexity is most likely represents a small arachnoid   cyst.       Parenchyma:  
 No mass, acute hemorrhage or acute cortical vascular insults. Scattered   hypo
densities in the supratentorial white matter are nonspecific but most   compatib
le with chronic small vessel ischemic changes.         Sellar/suprasellar region
: No abnormalities.   Craniocervical junction: Patent foramen magnum. No Chiari 
one malformation.      Included paranasal sinuses: Mild inflammatory mucosal thi
ckening in the left   maxillary sinus.      Incidental findings:    Atherosclero
tic calcifications in the carotid siphons and intradural vertebral   arteries. B
ilateral lens replacements related to previous cataract surgery.         IMPRESS
ION:       1.  No acute intracranial abnormalities.   2.  Mild generalized volum
e loss.   3.  Moderate supratentorial chronic microvascular ischemic changes.   
4.  Incidental bilateral sphenoid demineralization with lytic changes.      Sign
ed by: Dr. Desiree Maria M.D. on 1/10/2018 5:47 AM        Dictated By: DEISREE MARIA MD  Electronically Signed By: DESIREE MARIA MD on 01/10/18 0547  Trans
cribed By: BILL on 01/10/18 0547       COPY TO:   SCAR GIRON MD

## 2020-08-29 NOTE — DIAGNOSTIC IMAGING REPORT
EXAM: CT Abdomen and Pelvis WITH contrast  

INDICATION: RLQ ABD PAIN (H/O APPENDECTOMY YRS AGO) N/V/D 

COMPARISON: Multiple prior CTs of the abdomen/pelvis including most recent on

1/11/2020.

TECHNIQUE: Abdomen and pelvis were scanned utilizing a multidetector helical

scanner from the lung base to the pubic symphysis after administration of IV

contrast. Coronal and sagittal reformations were obtained. Routine protocol was

performed. Scan was performed when during portal venous phase.    

     IV CONTRAST: 100 mL of Isovue 370

     ORAL CONTRAST: None

            

COMPLICATIONS: None



RADIATION DOSE:

     Total DLP: 567.89 mGy*cm

     Estimated effective dose: (DLP x 0.015 x size factor) mSv

     CTDIvol has been reviewed. It is below the limits set by the Radiation

Protocol Committee (RPC).

     Dose modulation, iterative reconstruction, and/or weight based adjustment

of the mA/kV was utilized to reduce the radiation dose to as low as reasonably

achievable. 



FINDINGS:



LINES and TUBES: None.



LOWER THORAX:  Left atrial enlargement, aortic annular and coronary artery

calcification. Bibasilar atelectasis.



HEPATOBILIARY: No focal hepatic lesions. No biliary ductal dilation. 



GALLBLADDER: There are multiple stones in the gallbladder. The gallbladder wall

is thickened, likely due to underdistention. No significant pericholecystic

fluid. No interval changes from prior examinations.



SPLEEN: No splenomegaly. \Filling focus in the spleen which may represent a

hemangioma. There are multiple subcentimeter hypodensities throughout the

spleen, too small to characterize by CT but likely benign.



PANCREAS: No focal masses or ductal dilatation.  



ADRENALS: No adrenal nodules    



KIDNEYS/URETERS: Kidneys enhance symmetrically.  No hydronephrosis. No cystic

or solid mass lesions.  No stones.



GI TRACT: There is a moderate-sized hiatal hernia. There is mucosal thickening

and mild dilatation of multiple central and distal loops of small bowel with

engorgement of the mesenteric vasculature. These findings are similar to prior

examination. There is duodenal diverticulum and large proximal jejunal

diverticulum which are unchanged. There is no large bowel distention or wall

thickening. The appendix is not visualized.



PELVIC ORGANS/BLADDER: Unremarkable.



LYMPH NODES: There is redemonstration of multiple mildly enlarged mesenteric

lymph nodes measuring up to 1 cm (series 2 image 44). No pelvic sidewall or

retroperitoneal lymphadenopathy. 



VESSELS: There is moderate atherosclerotic disease in the aorta and major

arterial branches.



PERITONEUM / RETROPERITONEUM: No free air or fluid.



BONES: There are degenerative changes in the spine.



SOFT TISSUES: Unremarkable.   



IMPRESSION: 

1.  Mucosal wall thickening and mild dilatation of multiple central and distal

loops of small bowel with engorgement of the mesenteric vasculature. These

findings are similar to prior examinations and remain concerning for

inflammatory bowel disease such as Crohn's disease. 

2.  Cholelithiasis without evidence of acute cholecystitis.

3.  Moderate-sized hiatal hernia.



Signed by: Dionicio Pike MD on 8/29/2020 2:45 PM

## 2020-08-29 NOTE — EMERGENCY DEPARTMENT NOTE
History of Present Illnes


History of Present Illness


Chief Complaint:  Abdominal Complaints


History of Present Illness


This is a 79 year old  female PATIENT IN FROM HOME WITH COMPLAINTS OF 

ABDOMINAL PAIN, NAUSEA, VOMITING, AND DIARRHEA X 2 DAYS; PATIENT STATES THAT SHE

HAS HAD THIS PAIN MANY OTHER TIMES.


Historian:  Patient


Arrival Mode:  Car


 Required:  No


Onset (how long ago):  day(s) (2)


Location:  RLQ ABD


Quality:  PAIN


Radiation:  Reports non-radiation


Severity:  moderate


Onset quality:  gradual


Timing of current episode:  intermittent


Chronicity:  recurrent


Context:  Denies recent illness


Relieving factors:  none


Exacerbating factors:  none


Associated symptoms:  Reports nausea/vomiting, Reports other (DIARRHEA)


Treatments prior to arrival:  none





Past Medical/Family History


Physician Review


I have reviewed the patient's past medical and family history.  Any updates have

been documented here.





Past Medical History


Recent Fever:  Yes


Clinical Suspicion of Infectio:  Yes


New/Unexplained Change in Ment:  No


Past Medical History:  Hypertension, UTI's, Anemia, GERD, Hyperlipedemia, 

Osteoarthritis


Other Medical History:  


ARTHRITIS


Past Surgical History:  Appendectomy, Hysterectomy, Cataract Removal


Other Surgery:  


L KNEE surgery ( 10 yrs ago)





Colonoscopy





lens implant





Social History


Smoking Cessation:  Never Smoker


Counseling Performed:  No


Alcohol Use:  None


Any Illegal Drug Use:  No


TB Exposure/Symptoms:  No


Physically hurt or threatened:  No





Family History


Family history of heart diseas:  No





Other


Last Tetanus:  2015


Any Pre-Existing Lines (PICC,:  No





Review of Systems


Review of Systems


Constitutional:  Reports no symptoms


EENTM:  Reports no symptoms


Cardiovascular:  Reports no symptoms


Respiratory:  Reports no symptoms


Gastrointestinal:  Reports as per HPI, Reports abdominal pain, Reports diarrhea,

Reports nausea, Reports vomiting


Genitourinary:  Reports no symptoms


Musculoskeletal:  Reports no symptoms


Integumentary:  Reports no symptoms


Neurological:  Reports no symptoms


Psychological:  Reports no symptoms


Endocrine:  Reports no symptoms


Hematological/Lymphatic:  Reports no symptoms





Physical Exam


Related Data


Allergies:  


Coded Allergies:  


     latex (Verified  Allergy, Intermediate, Rahses, 8/29/20)


     pregabalin (Verified  Adverse Reaction, Intermediate, Drowsiness, 8/29/20)


Triage Vital Signs





Vital Signs








  Date Time  Temp Pulse Resp B/P (MAP) Pulse Ox O2 Delivery O2 Flow Rate FiO2


 


8/29/20 10:54 98.1 79 20 115/59 98 Room Air  








Vital signs reviewed:  Yes





Physical Exam


CONSTITUTIONAL





Constitutional:  Present well-developed, Present well-nourished


HENT


HENT:  Present normocephalic, Present atraumatic, Present oropharynx 

clear/moist, Present nose normal


HENT L/R:  Present left ext ear normal, Present right ext ear normal


EYES





Eyes:  Reports PERRL, Reports conjunctivae normal


NECK


Neck:  Present ROM normal


PULMONARY


Pulmonary:  Present effort normal, Present breath sounds normal


CARDIOVASCULAR





Cardiovascular:  Present regular rhythm, Present heart sounds normal, Present 

capillary refill normal, Present normal rate


GASTROINTESTINAL





Abdominal:  Present soft, Present bowel sounds normal, Present tender (MILD 

TENDERNESS RLQ WITHOUT R/G); 


   Absent guarding, Absent rebound, Absent left CVA tenderness, Absent right CVA

tenderness


GENITOURINARY





Genitourinary:  Present exam deferred


SKIN


Skin:  Present warm, Present dry


MUSCULOSKELETAL





Musculoskeletal:  Present ROM normal


NEUROLOGICAL





Neurological:  Present alert, Present oriented x 3, Present no gross motor or 

sensory deficits


PSYCHOLOGICAL


Psychological:  Present mood/affect normal, Present judgement normal





Results


Laboratory


Result Diagram:  


8/29/20 1124                                                                    

           8/29/20 1124





Laboratory





Laboratory Tests








Test


 8/29/20


11:24 8/29/20


11:04


 


White Blood Count


 7.69 x10e3/uL


(4.8-10.8) 





 


Red Blood Count


 3.76 x10e6/uL


(3.6-5.1) 





 


Hemoglobin


 10.4 g/dL


(12.0-16.0) 





 


Hematocrit


 33.2 %


(34.2-44.1) 





 


Mean Corpuscular Volume


 88.3 fL


(81-99) 





 


Mean Corpuscular Hemoglobin


 27.7 pg


(28-32) 





 


Mean Corpuscular Hemoglobin


Concent 31.3 g/dL


(31-35) 





 


Red Cell Distribution Width


 16.0 %


(11.7-14.4) 





 


Platelet Count


 334 x10e3/uL


(140-360) 





 


Neutrophils (%) (Auto)


 75.5 %


(38.7-80.0) 





 


Lymphocytes (%) (Auto)


 12.5 %


(18.0-39.1) 





 


Monocytes (%) (Auto)


 9.9  %


(4.4-11.3) 





 


Eosinophils (%) (Auto)


 0.9 %


(0.0-6.0) 





 


Basophils (%) (Auto)


 0.7 %


(0.0-1.0) 





 


Neutrophils # (Auto) 5.8 (2.1-6.9)  


 


Lymphocytes # (Auto) 1.0 (1.0-3.2)  


 


Monocytes # (Auto) 0.8 (0.2-0.8)  


 


Eosinophils # (Auto) 0.1 (0.0-0.4)  


 


Basophils # (Auto) 0.1 (0.0-0.1)  


 


Absolute Immature Granulocyte


(auto 0.04 x10e3/uL


(0-0.1) 





 


Prothrombin Time


 13.7 seconds


(11.9-14.5) 





 


Prothromb Time International


Ratio 1.00 


 





 


Activated Partial


Thromboplast Time 26.3 seconds


(23.8-35.5) 





 


Sodium Level


 142 mmol/L


(136-145) 





 


Potassium Level


 3.8 mmol/L


(3.5-5.1) 





 


Chloride Level


 109 mmol/L


() 





 


Carbon Dioxide Level


 19 mmol/L


(22-29) 





 


Anion Gap


 17.8 mmol/L


(8-16) 





 


Blood Urea Nitrogen


 14 mg/dL


(7-26) 





 


Creatinine


 0.73 mg/dL


(0.57-1.11) 





 


Estimat Glomerular Filtration


Rate > 60 ML/MIN


(60-) 





 


BUN/Creatinine Ratio 19 (6-25)  


 


Glucose Level


 122 mg/dL


() 





 


Calcium Level


 8.1 mg/dL


(8.4-10.2) 





 


Magnesium Level


 1.4 MG/DL


(1.3-2.1) 





 


Total Bilirubin


 0.3 mg/dL


(0.2-1.2) 





 


Aspartate Amino Transf


(AST/SGOT) 17 IU/L (5-34) 


 





 


Alanine Aminotransferase


(ALT/SGPT) 12 IU/L (0-55) 


 





 


Alkaline Phosphatase


 64 IU/L


() 





 


Creatine Kinase


 53 IU/L


() 





 


Creatine Kinase MB


 1.60 ng/mL


(0-5.0) 





 


Troponin I


 0.002 ng/mL


(0-0.300) 





 


Total Protein


 5.9 g/dL


(6.5-8.1) 





 


Albumin


 2.7 g/dL


(3.5-5.0) 





 


Globulin


 3.2 g/dL


(2.3-3.5) 





 


Albumin/Globulin Ratio 0.8 (0.8-2.0)  


 


Urine Color


 


 Yellow


(YELLOW)


 


Urine Clarity  Clear (CLEAR) 


 


Urine pH  6 (5 - 7) 


 


Urine Specific Gravity


 


 >=1.030


(1.010-1.025)


 


Urine Protein  1+ (NEGATIVE) 


 


Urine Glucose (UA)


 


 Negative


(NEGATIVE)


 


Urine Ketones


 


 Trace


(NEGATIVE)


 


Urine Blood


 


 Trace


(NEGATIVE)


 


Urine Nitrite


 


 Negative


(NEGATIVE)


 


Urine Bilirubin


 


 Small


(NEGATIVE)


 


Urine Urobilinogen


 


 0.2 mg/dL (0.2


- 1)


 


Urine Leukocyte Esterase


 


 Negative


(NEGATIVE)


 


Urine RBC  0-5 /HPF (0-5) 


 


Urine WBC  0-5 /HPF (0-5) 


 


Urine Epithelial Cells


 


 Few /LPF


(NONE)


 


Urine Bacteria


 


 Many /HPF


(NONE)


 


Urine Mucus  Few (RARE) 








Lab results reviewed:  Yes





Imaging


Imaging results reviewed:  Yes


Impressions


EXAM: CT Abdomen and Pelvis WITH contrast  


INDICATION: RLQ ABD PAIN (H/O APPENDECTOMY YRS AGO) N/V/D 


COMPARISON: Multiple prior CTs of the abdomen/pelvis including most recent on


1/11/2020.


TECHNIQUE: Abdomen and pelvis were scanned utilizing a multidetector helical


scanner from the lung base to the pubic symphysis after administration of IV


contrast. Coronal and sagittal reformations were obtained. Routine protocol was


performed. Scan was performed when during portal venous phase.    


     IV CONTRAST: 100 mL of Isovue 370


     ORAL CONTRAST: None


            


COMPLICATIONS: None





RADIATION DOSE:


     Total DLP: 567.89 mGy*cm


     Estimated effective dose: (DLP x 0.015 x size factor) mSv


     CTDIvol has been reviewed. It is below the limits set by the Radiation


Protocol Committee (RPC).


     Dose modulation, iterative reconstruction, and/or weight based adjustment


of the mA/kV was utilized to reduce the radiation dose to as low as reasonably


achievable. 





FINDINGS:





LINES and TUBES: None.





LOWER THORAX:  Left atrial enlargement, aortic annular and coronary artery


calcification. Bibasilar atelectasis.





HEPATOBILIARY: No focal hepatic lesions. No biliary ductal dilation. 





GALLBLADDER: There are multiple stones in the gallbladder. The gallbladder wall


is thickened, likely due to underdistention. No significant pericholecystic


fluid. No interval changes from prior examinations.





SPLEEN: No splenomegaly. \Filling focus in the spleen which may represent a


hemangioma. There are multiple subcentimeter hypodensities throughout the


spleen, too small to characterize by CT but likely benign.





PANCREAS: No focal masses or ductal dilatation.  





ADRENALS: No adrenal nodules    





KIDNEYS/URETERS: Kidneys enhance symmetrically.  No hydronephrosis. No cystic


or solid mass lesions.  No stones.





GI TRACT: There is a moderate-sized hiatal hernia. There is mucosal thickening


and mild dilatation of multiple central and distal loops of small bowel with


engorgement of the mesenteric vasculature. These findings are similar to prior


examination. There is duodenal diverticulum and large proximal jejunal


diverticulum which are unchanged. There is no large bowel distention or wall


thickening. The appendix is not visualized.





PELVIC ORGANS/BLADDER: Unremarkable.





LYMPH NODES: There is redemonstration of multiple mildly enlarged mesenteric


lymph nodes measuring up to 1 cm (series 2 image 44). No pelvic sidewall or


retroperitoneal lymphadenopathy. 





VESSELS: There is moderate atherosclerotic disease in the aorta and major


arterial branches.





PERITONEUM / RETROPERITONEUM: No free air or fluid.





BONES: There are degenerative changes in the spine.





SOFT TISSUES: Unremarkable.   





IMPRESSION: 


1.  Mucosal wall thickening and mild dilatation of multiple central and distal


loops of small bowel with engorgement of the mesenteric vasculature. These


findings are similar to prior examinations and remain concerning for


inflammatory bowel disease such as Crohn's disease. 


2.  Cholelithiasis without evidence of acute cholecystitis.


3.  Moderate-sized hiatal hernia.





Signed by: Dionicio Pike MD on 8/29/2020 2:45 PM





Procedures


12 Lead ECG Interpretation


ECG Interpretation :  


   ECG:  ECG 1


   :  Interpreted by ED physician


   Date:  Aug 29, 2020


   Time:  10:59


   Rhythm:  sinus rhythm


   Rate:  normal (78)


   QRS axis:  normal


   ST segments normal:  Yes


   T waves normal:  Yes


   Clinical Impression:  normal ECG





Assessment & Plan


Medical Decision Making


MDM


ABD PAIN, N/V/D - CHECK CBC, CHEM, UA/CX, CT ABD/PELVIS - EVAL COLITIS, 

GASTROENTERITIS, DIVERTICULITIS, LEUKOCYTOSIS, UTI, SBO, INFLAMMATORY BOWEL





Reassessment


Reassessment


DC HOME ON CIPRO/FLAGYL, ZOFRAN/BENTYL, F/U PCP AND GI DR ELIZABETH GALVAN





Assessment & Plan


Final Impression:  


(1) Nausea & vomiting


(2) Abdominal pain


(3) Vomiting and diarrhea


Depart Disposition:  HOME, SELF-CARE


Last Vital Signs











  Date Time  Temp Pulse Resp B/P (MAP) Pulse Ox O2 Delivery O2 Flow Rate FiO2


 


8/29/20 11:47 98.0 67 18 126/29 98 Room Air  








Home Meds


Active Scripts


Cephalexin Monohydrate (KEFLEX) 500 Mg Capsule, 500 MG PO Q12H, #10


   Prov:FELECIA ALLISON MD         7/19/19


Dicyclomine Hcl (DICYCLOMINE HCL) 10 Mg Capsule, 10 MG PO TID for 10 Days, #30


   Prov:JOSSE SWAN NP         7/15/18


Rifaximin (XIFAXAN) 200 Mg Tablet, 500 MG PO Q12HR for 10 Days, #10


   Prov:JOSSE SWAN NP         7/15/18


[Mesalamine] 400 MG CAP No Conflict Check, 400 MG PO DAILY for 10 Days, #10


   Prov:JOSSE SWAN NP         7/15/18


Prednisone (PREDNISONE) 20 Mg Tab, 20 MG PO DAILY for 7 Days, TAB


   Prov:FELECIA ALLISON MD         5/31/18


Potassium Chloride (POTASSIUM CHLORIDE) 20 Meq Tab.er.prt, 20 MEQ PO DAILY for 

10 Days


   Prov:FELECIA ALLISON MD         1/19/18


Ondansetron (ZOFRAN ODT) 4 Mg Tab.rapdis, 4 MG PO Q6H for 10 Days, TAB


   Prov:FELECIA ALLISON MD         1/19/18


Nifedipine (NIFEDIPINE ER) 30 Mg Tab.er.24, 30 MG PO Q12HR for 30 Days


   Prov:FELECIA ALLISON MD         1/19/18


Hydralazine Hcl (HYDRALAZINE HCL) 25 Mg Tab, 25 MG PO Q8H for 30 Days, TAB


   Prov:FELECIA ALLISON MD         1/19/18


[Apixaban] 2.5 MG TABLET No Conflict Check, 5 MG PO Q12HR for 30 Days, 2 Refills


   Prov:MINGO BROWN MD         6/10/16


Reported Medications


Metronidazole (METRONIDAZOLE) 500 Mg Tablet, 500 MG PO Q8HR for 5 Days, TAB


   7/19/19


Sertraline Hcl (SERTRALINE HCL) 50 Mg Tablet, 25 MG PO DAILY, #30 TAB


   7/18/19


Zolpidem Tartrate (AMBIEN) 10 Mg Tablet, 10 MG PO HS PRN for INSOMNIA, #30 TAB


   7/18/19


Azathioprine (AZATHIOPRINE) 50 Mg Tab, 150 MG PO DAILY, #30 TAB


   7/18/19


Famotidine (FAMOTIDINE) 20 Mg Tab, 20 MG PO BID, #30 TAB


   1/10/18


Tolterodine Tartrate (DETROL LA) 4 Mg Cap.er.24h, 4 MG PO DAILY, #30 CAP


   9/7/15


Lovastatin (LOVASTATIN) 20 Mg Tablet, 20 MG PO DAILY


   7/1/14


Lisinopril (LISINOPRIL) 10 Mg Tablet, 10 MG PO DAILY, #30 TAB


   6/30/14


Ferrous Sulfate (FERROUS SULFATE) 325 Mg Tablet.dr, 325 MG PO DAILY, TAB


   6/30/14


Calcium Citrate/Vitamin D3 (CITRACAL + D MAXIMUM CAPLET) 1 Each Tablet, 1 TAB PO

 DAILY


   6/29/14


Medications in the ED





Pantoprazole Sodium 40 mg ONCE  ONCE IV  Last administered on 8/29/20at 11:44; 

Admin Dose 40 MG;  Start 8/29/20 at 12:00;  Stop 8/29/20 at 12:01;  Status DC


Ondansetron HCl 4 mg ONCE  ONCE IV  Last administered on 8/29/20at 11:44; Admin 

Dose 4 MG;  Start 8/29/20 at 11:30;  Stop 8/29/20 at 11:31;  Status DC


Sodium Chloride 1,000 ml @  0 mls/hr Q0M STAT IV  Last administered on 8/29/20at

 11:44; Admin Dose 999 MLS/HR;  Start 8/29/20 at 11:10;  Stop 8/29/20 at 11:13; 

 Status DC











SCAR GIRON MD               Aug 29, 2020 12:50

## 2021-05-03 ENCOUNTER — HOSPITAL ENCOUNTER (INPATIENT)
Dept: HOSPITAL 88 - ER | Age: 81
LOS: 5 days | Discharge: HOME | DRG: 386 | End: 2021-05-08
Attending: INTERNAL MEDICINE | Admitting: INTERNAL MEDICINE
Payer: MEDICARE

## 2021-05-03 VITALS — DIASTOLIC BLOOD PRESSURE: 86 MMHG | SYSTOLIC BLOOD PRESSURE: 103 MMHG

## 2021-05-03 VITALS — BODY MASS INDEX: 18.4 KG/M2 | WEIGHT: 100 LBS | HEIGHT: 62 IN

## 2021-05-03 VITALS — SYSTOLIC BLOOD PRESSURE: 103 MMHG | DIASTOLIC BLOOD PRESSURE: 86 MMHG

## 2021-05-03 VITALS — SYSTOLIC BLOOD PRESSURE: 103 MMHG | DIASTOLIC BLOOD PRESSURE: 89 MMHG

## 2021-05-03 DIAGNOSIS — I10: ICD-10-CM

## 2021-05-03 DIAGNOSIS — Z20.822: ICD-10-CM

## 2021-05-03 DIAGNOSIS — I25.10: ICD-10-CM

## 2021-05-03 DIAGNOSIS — E78.5: ICD-10-CM

## 2021-05-03 DIAGNOSIS — B96.89: ICD-10-CM

## 2021-05-03 DIAGNOSIS — N39.0: ICD-10-CM

## 2021-05-03 DIAGNOSIS — I47.1: ICD-10-CM

## 2021-05-03 DIAGNOSIS — N17.9: ICD-10-CM

## 2021-05-03 DIAGNOSIS — K50.918: Primary | ICD-10-CM

## 2021-05-03 DIAGNOSIS — D64.9: ICD-10-CM

## 2021-05-03 DIAGNOSIS — I48.0: ICD-10-CM

## 2021-05-03 DIAGNOSIS — E87.6: ICD-10-CM

## 2021-05-03 DIAGNOSIS — K50.90: ICD-10-CM

## 2021-05-03 DIAGNOSIS — K50.119: ICD-10-CM

## 2021-05-03 DIAGNOSIS — K52.9: ICD-10-CM

## 2021-05-03 LAB
ALBUMIN SERPL-MCNC: 2.4 G/DL (ref 3.5–5)
ALBUMIN/GLOB SERPL: 0.6 {RATIO} (ref 0.8–2)
ALP SERPL-CCNC: 106 IU/L (ref 40–150)
ALT SERPL-CCNC: 16 IU/L (ref 0–55)
ANION GAP SERPL CALC-SCNC: 17.1 MMOL/L (ref 8–16)
BACTERIA URNS QL MICRO: (no result) /HPF
BASOPHILS # BLD AUTO: 0.1 10*3/UL (ref 0–0.1)
BASOPHILS NFR BLD AUTO: 0.9 % (ref 0–1)
BUN SERPL-MCNC: 27 MG/DL (ref 7–26)
BUN/CREAT SERPL: 20 (ref 6–25)
CALCIUM SERPL-MCNC: 7.5 MG/DL (ref 8.4–10.2)
CHLORIDE SERPL-SCNC: 103 MMOL/L (ref 98–107)
CLARITY UR: CLEAR
CO2 SERPL-SCNC: 22 MMOL/L (ref 22–29)
COLOR UR: YELLOW
DEPRECATED NEUTROPHILS # BLD AUTO: 6.1 10*3/UL (ref 2.1–6.9)
DEPRECATED RBC URNS MANUAL-ACNC: (no result) /HPF (ref 0–5)
EGFRCR SERPLBLD CKD-EPI 2021: 38 ML/MIN (ref 60–?)
EOSINOPHIL # BLD AUTO: 0 10*3/UL (ref 0–0.4)
EOSINOPHIL NFR BLD AUTO: 0.1 % (ref 0–6)
EPI CELLS URNS QL MICRO: (no result) /LPF
ERYTHROCYTE [DISTWIDTH] IN CORD BLOOD: 17.6 % (ref 11.7–14.4)
GLOBULIN PLAS-MCNC: 3.7 G/DL (ref 2.3–3.5)
GLUCOSE SERPLBLD-MCNC: 127 MG/DL (ref 74–118)
HCT VFR BLD AUTO: 35 % (ref 34.2–44.1)
HGB BLD-MCNC: 11.2 G/DL (ref 12–16)
KETONES UR QL STRIP.AUTO: NEGATIVE
LEUKOCYTE ESTERASE UR QL STRIP.AUTO: (no result)
LIPASE SERPL-CCNC: 11 U/L (ref 8–78)
LYMPHOCYTES # BLD: 1 10*3/UL (ref 1–3.2)
LYMPHOCYTES NFR BLD AUTO: 12.5 % (ref 18–39.1)
MCH RBC QN AUTO: 28.8 PG (ref 28–32)
MCHC RBC AUTO-ENTMCNC: 32 G/DL (ref 31–35)
MCV RBC AUTO: 90 FL (ref 81–99)
MONOCYTES # BLD AUTO: 0.9 10*3/UL (ref 0.2–0.8)
MONOCYTES NFR BLD AUTO: 11.1 % (ref 4.4–11.3)
NEUTS SEG NFR BLD AUTO: 75 % (ref 38.7–80)
NITRITE UR QL STRIP.AUTO: POSITIVE
PLATELET # BLD AUTO: 349 X10E3/UL (ref 140–360)
POTASSIUM SERPL-SCNC: 3.1 MMOL/L (ref 3.5–5.1)
PROT UR QL STRIP.AUTO: NEGATIVE
RBC # BLD AUTO: 3.89 X10E6/UL (ref 3.6–5.1)
SODIUM SERPL-SCNC: 139 MMOL/L (ref 136–145)
SP GR UR STRIP: 1 (ref 1.01–1.02)
UROBILINOGEN UR STRIP-MCNC: 0.2 MG/DL (ref 0.2–1)
WBC #/AREA URNS HPF: (no result) /HPF (ref 0–5)

## 2021-05-03 PROCEDURE — 84100 ASSAY OF PHOSPHORUS: CPT

## 2021-05-03 PROCEDURE — 96360 HYDRATION IV INFUSION INIT: CPT

## 2021-05-03 PROCEDURE — 99284 EMERGENCY DEPT VISIT MOD MDM: CPT

## 2021-05-03 PROCEDURE — 97139 UNLISTED THERAPEUTIC PX: CPT

## 2021-05-03 PROCEDURE — 93306 TTE W/DOPPLER COMPLETE: CPT

## 2021-05-03 PROCEDURE — 36415 COLL VENOUS BLD VENIPUNCTURE: CPT

## 2021-05-03 PROCEDURE — 84132 ASSAY OF SERUM POTASSIUM: CPT

## 2021-05-03 PROCEDURE — 74177 CT ABD & PELVIS W/CONTRAST: CPT

## 2021-05-03 PROCEDURE — 83735 ASSAY OF MAGNESIUM: CPT

## 2021-05-03 PROCEDURE — 80053 COMPREHEN METABOLIC PANEL: CPT

## 2021-05-03 PROCEDURE — 85025 COMPLETE CBC W/AUTO DIFF WBC: CPT

## 2021-05-03 PROCEDURE — 87186 SC STD MICRODIL/AGAR DIL: CPT

## 2021-05-03 PROCEDURE — 81001 URINALYSIS AUTO W/SCOPE: CPT

## 2021-05-03 PROCEDURE — 83690 ASSAY OF LIPASE: CPT

## 2021-05-03 PROCEDURE — 93005 ELECTROCARDIOGRAM TRACING: CPT

## 2021-05-03 PROCEDURE — 96361 HYDRATE IV INFUSION ADD-ON: CPT

## 2021-05-03 PROCEDURE — 80048 BASIC METABOLIC PNL TOTAL CA: CPT

## 2021-05-03 PROCEDURE — 99251: CPT

## 2021-05-03 PROCEDURE — 87086 URINE CULTURE/COLONY COUNT: CPT

## 2021-05-03 RX ADMIN — ONDANSETRON SCH MG: 4 TABLET, ORALLY DISINTEGRATING ORAL at 23:52

## 2021-05-03 RX ADMIN — POTASSIUM CHLORIDE SCH MEQ: 1500 TABLET, EXTENDED RELEASE ORAL at 21:48

## 2021-05-03 RX ADMIN — ZOLPIDEM TARTRATE PRN MG: 10 TABLET, FILM COATED ORAL at 22:36

## 2021-05-03 RX ADMIN — DICYCLOMINE HYDROCHLORIDE SCH MG: 10 CAPSULE ORAL at 21:47

## 2021-05-03 RX ADMIN — METRONIDAZOLE SCH MLS/HR: 500 INJECTION, SOLUTION INTRAVENOUS at 22:30

## 2021-05-03 RX ADMIN — Medication SCH MG: at 21:48

## 2021-05-03 RX ADMIN — SODIUM CHLORIDE SCH MLS/HR: 4.5 INJECTION, SOLUTION INTRAVENOUS at 21:42

## 2021-05-03 RX ADMIN — HYDRALAZINE HYDROCHLORIDE SCH MG: 25 TABLET ORAL at 22:30

## 2021-05-04 VITALS — SYSTOLIC BLOOD PRESSURE: 92 MMHG | DIASTOLIC BLOOD PRESSURE: 43 MMHG

## 2021-05-04 VITALS — DIASTOLIC BLOOD PRESSURE: 55 MMHG | SYSTOLIC BLOOD PRESSURE: 107 MMHG

## 2021-05-04 VITALS — DIASTOLIC BLOOD PRESSURE: 60 MMHG | SYSTOLIC BLOOD PRESSURE: 114 MMHG

## 2021-05-04 VITALS — DIASTOLIC BLOOD PRESSURE: 63 MMHG | SYSTOLIC BLOOD PRESSURE: 109 MMHG

## 2021-05-04 VITALS — SYSTOLIC BLOOD PRESSURE: 120 MMHG | DIASTOLIC BLOOD PRESSURE: 62 MMHG

## 2021-05-04 VITALS — DIASTOLIC BLOOD PRESSURE: 43 MMHG | SYSTOLIC BLOOD PRESSURE: 92 MMHG

## 2021-05-04 VITALS — SYSTOLIC BLOOD PRESSURE: 110 MMHG | DIASTOLIC BLOOD PRESSURE: 66 MMHG

## 2021-05-04 LAB
ANION GAP SERPL CALC-SCNC: 10.9 MMOL/L (ref 8–16)
BASOPHILS # BLD AUTO: 0 10*3/UL (ref 0–0.1)
BASOPHILS NFR BLD AUTO: 0.5 % (ref 0–1)
BUN SERPL-MCNC: 18 MG/DL (ref 7–26)
BUN/CREAT SERPL: 28 (ref 6–25)
CALCIUM SERPL-MCNC: 6.9 MG/DL (ref 8.4–10.2)
CHLORIDE SERPL-SCNC: 111 MMOL/L (ref 98–107)
CO2 SERPL-SCNC: 22 MMOL/L (ref 22–29)
DEPRECATED NEUTROPHILS # BLD AUTO: 4 10*3/UL (ref 2.1–6.9)
DEPRECATED PHOSPHATE SERPL-MCNC: 3.6 MG/DL (ref 2.3–4.7)
EGFRCR SERPLBLD CKD-EPI 2021: > 60 ML/MIN (ref 60–?)
EOSINOPHIL # BLD AUTO: 0 10*3/UL (ref 0–0.4)
EOSINOPHIL NFR BLD AUTO: 0.7 % (ref 0–6)
ERYTHROCYTE [DISTWIDTH] IN CORD BLOOD: 17.6 % (ref 11.7–14.4)
GLUCOSE SERPLBLD-MCNC: 90 MG/DL (ref 74–118)
HCT VFR BLD AUTO: 28.4 % (ref 34.2–44.1)
HGB BLD-MCNC: 9.1 G/DL (ref 12–16)
LYMPHOCYTES # BLD: 0.8 10*3/UL (ref 1–3.2)
LYMPHOCYTES NFR BLD AUTO: 14.9 % (ref 18–39.1)
MAGNESIUM SERPL-MCNC: 1.3 MG/DL (ref 1.3–2.1)
MAGNESIUM SERPL-MCNC: 1.3 MG/DL (ref 1.3–2.1)
MCH RBC QN AUTO: 28.8 PG (ref 28–32)
MCHC RBC AUTO-ENTMCNC: 32 G/DL (ref 31–35)
MCV RBC AUTO: 89.9 FL (ref 81–99)
MONOCYTES # BLD AUTO: 0.8 10*3/UL (ref 0.2–0.8)
MONOCYTES NFR BLD AUTO: 13.6 % (ref 4.4–11.3)
NEUTS SEG NFR BLD AUTO: 70.1 % (ref 38.7–80)
PLATELET # BLD AUTO: 256 X10E3/UL (ref 140–360)
POTASSIUM SERPL-SCNC: 2.9 MMOL/L (ref 3.5–5.1)
POTASSIUM SERPL-SCNC: 4.7 MMOL/L (ref 3.5–5.1)
RBC # BLD AUTO: 3.16 X10E6/UL (ref 3.6–5.1)
SODIUM SERPL-SCNC: 141 MMOL/L (ref 136–145)

## 2021-05-04 RX ADMIN — SODIUM CHLORIDE SCH MLS/HR: 4.5 INJECTION, SOLUTION INTRAVENOUS at 16:16

## 2021-05-04 RX ADMIN — METHYLPREDNISOLONE SODIUM SUCCINATE SCH MG: 40 INJECTION, POWDER, LYOPHILIZED, FOR SOLUTION INTRAMUSCULAR; INTRAVENOUS at 08:30

## 2021-05-04 RX ADMIN — HYDRALAZINE HYDROCHLORIDE SCH MG: 25 TABLET ORAL at 21:53

## 2021-05-04 RX ADMIN — HYDRALAZINE HYDROCHLORIDE SCH MG: 25 TABLET ORAL at 16:30

## 2021-05-04 RX ADMIN — Medication SCH MG: at 21:00

## 2021-05-04 RX ADMIN — DICYCLOMINE HYDROCHLORIDE SCH MG: 10 CAPSULE ORAL at 08:30

## 2021-05-04 RX ADMIN — ZOLPIDEM TARTRATE PRN MG: 10 TABLET, FILM COATED ORAL at 21:53

## 2021-05-04 RX ADMIN — SODIUM CHLORIDE SCH MLS/HR: 9 INJECTION, SOLUTION INTRAVENOUS at 21:53

## 2021-05-04 RX ADMIN — SODIUM CHLORIDE SCH MG: 900 INJECTION INTRAVENOUS at 08:30

## 2021-05-04 RX ADMIN — ONDANSETRON SCH MG: 4 TABLET, ORALLY DISINTEGRATING ORAL at 12:30

## 2021-05-04 RX ADMIN — METRONIDAZOLE SCH MLS/HR: 500 INJECTION, SOLUTION INTRAVENOUS at 22:44

## 2021-05-04 RX ADMIN — ONDANSETRON SCH MG: 4 TABLET, ORALLY DISINTEGRATING ORAL at 06:17

## 2021-05-04 RX ADMIN — TOLTERODINE TARTRATE SCH MG: 4 CAPSULE, EXTENDED RELEASE ORAL at 08:30

## 2021-05-04 RX ADMIN — ONDANSETRON SCH MG: 4 TABLET, ORALLY DISINTEGRATING ORAL at 17:44

## 2021-05-04 RX ADMIN — METHYLPREDNISOLONE SODIUM SUCCINATE SCH MG: 40 INJECTION, POWDER, LYOPHILIZED, FOR SOLUTION INTRAMUSCULAR; INTRAVENOUS at 21:52

## 2021-05-04 RX ADMIN — DICYCLOMINE HYDROCHLORIDE SCH MG: 10 CAPSULE ORAL at 16:30

## 2021-05-04 RX ADMIN — Medication SCH MG: at 08:30

## 2021-05-04 RX ADMIN — POTASSIUM CHLORIDE SCH MEQ: 1500 TABLET, EXTENDED RELEASE ORAL at 08:30

## 2021-05-04 RX ADMIN — METRONIDAZOLE SCH MLS/HR: 500 INJECTION, SOLUTION INTRAVENOUS at 06:21

## 2021-05-04 RX ADMIN — APIXABAN SCH MG: 5 TABLET, FILM COATED ORAL at 21:52

## 2021-05-04 RX ADMIN — APIXABAN SCH MG: 5 TABLET, FILM COATED ORAL at 08:30

## 2021-05-04 RX ADMIN — METHYLPREDNISOLONE SODIUM SUCCINATE SCH MG: 40 INJECTION, POWDER, LYOPHILIZED, FOR SOLUTION INTRAMUSCULAR; INTRAVENOUS at 06:16

## 2021-05-04 RX ADMIN — HYDRALAZINE HYDROCHLORIDE SCH MG: 25 TABLET ORAL at 06:00

## 2021-05-04 RX ADMIN — SERTRALINE HYDROCHLORIDE SCH MG: 50 TABLET, FILM COATED ORAL at 08:30

## 2021-05-04 RX ADMIN — METRONIDAZOLE SCH MLS/HR: 500 INJECTION, SOLUTION INTRAVENOUS at 16:00

## 2021-05-04 RX ADMIN — DICYCLOMINE HYDROCHLORIDE SCH MG: 10 CAPSULE ORAL at 21:52

## 2021-05-05 VITALS — DIASTOLIC BLOOD PRESSURE: 78 MMHG | SYSTOLIC BLOOD PRESSURE: 150 MMHG

## 2021-05-05 VITALS — DIASTOLIC BLOOD PRESSURE: 82 MMHG | SYSTOLIC BLOOD PRESSURE: 146 MMHG

## 2021-05-05 VITALS — DIASTOLIC BLOOD PRESSURE: 68 MMHG | SYSTOLIC BLOOD PRESSURE: 135 MMHG

## 2021-05-05 VITALS — SYSTOLIC BLOOD PRESSURE: 146 MMHG | DIASTOLIC BLOOD PRESSURE: 82 MMHG

## 2021-05-05 VITALS — SYSTOLIC BLOOD PRESSURE: 135 MMHG | DIASTOLIC BLOOD PRESSURE: 68 MMHG

## 2021-05-05 VITALS — DIASTOLIC BLOOD PRESSURE: 76 MMHG | SYSTOLIC BLOOD PRESSURE: 138 MMHG

## 2021-05-05 VITALS — SYSTOLIC BLOOD PRESSURE: 139 MMHG | DIASTOLIC BLOOD PRESSURE: 76 MMHG

## 2021-05-05 VITALS — SYSTOLIC BLOOD PRESSURE: 150 MMHG | DIASTOLIC BLOOD PRESSURE: 81 MMHG

## 2021-05-05 LAB
ANION GAP SERPL CALC-SCNC: 14.1 MMOL/L (ref 8–16)
BASOPHILS # BLD AUTO: 0 10*3/UL (ref 0–0.1)
BASOPHILS NFR BLD AUTO: 0.1 % (ref 0–1)
BUN SERPL-MCNC: 10 MG/DL (ref 7–26)
BUN/CREAT SERPL: 15 (ref 6–25)
CALCIUM SERPL-MCNC: 7.5 MG/DL (ref 8.4–10.2)
CHLORIDE SERPL-SCNC: 108 MMOL/L (ref 98–107)
CO2 SERPL-SCNC: 21 MMOL/L (ref 22–29)
DEPRECATED NEUTROPHILS # BLD AUTO: 6.7 10*3/UL (ref 2.1–6.9)
DEPRECATED PHOSPHATE SERPL-MCNC: 3 MG/DL (ref 2.3–4.7)
EGFRCR SERPLBLD CKD-EPI 2021: > 60 ML/MIN (ref 60–?)
EOSINOPHIL # BLD AUTO: 0 10*3/UL (ref 0–0.4)
EOSINOPHIL NFR BLD AUTO: 0 % (ref 0–6)
ERYTHROCYTE [DISTWIDTH] IN CORD BLOOD: 17.8 % (ref 11.7–14.4)
GLUCOSE SERPLBLD-MCNC: 214 MG/DL (ref 74–118)
HCT VFR BLD AUTO: 33.1 % (ref 34.2–44.1)
HGB BLD-MCNC: 10.7 G/DL (ref 12–16)
LYMPHOCYTES # BLD: 0.9 10*3/UL (ref 1–3.2)
LYMPHOCYTES NFR BLD AUTO: 11.1 % (ref 18–39.1)
MAGNESIUM SERPL-MCNC: 1.4 MG/DL (ref 1.3–2.1)
MCH RBC QN AUTO: 29.2 PG (ref 28–32)
MCHC RBC AUTO-ENTMCNC: 32.3 G/DL (ref 31–35)
MCV RBC AUTO: 90.2 FL (ref 81–99)
MONOCYTES # BLD AUTO: 0.5 10*3/UL (ref 0.2–0.8)
MONOCYTES NFR BLD AUTO: 6 % (ref 4.4–11.3)
NEUTS SEG NFR BLD AUTO: 82.2 % (ref 38.7–80)
PLATELET # BLD AUTO: 335 X10E3/UL (ref 140–360)
POTASSIUM SERPL-SCNC: 4.1 MMOL/L (ref 3.5–5.1)
RBC # BLD AUTO: 3.67 X10E6/UL (ref 3.6–5.1)
SODIUM SERPL-SCNC: 139 MMOL/L (ref 136–145)

## 2021-05-05 RX ADMIN — METRONIDAZOLE SCH MLS/HR: 500 INJECTION, SOLUTION INTRAVENOUS at 13:35

## 2021-05-05 RX ADMIN — ONDANSETRON SCH MG: 4 TABLET, ORALLY DISINTEGRATING ORAL at 05:27

## 2021-05-05 RX ADMIN — DICYCLOMINE HYDROCHLORIDE SCH MG: 10 CAPSULE ORAL at 15:41

## 2021-05-05 RX ADMIN — SODIUM CHLORIDE SCH MLS/HR: 4.5 INJECTION, SOLUTION INTRAVENOUS at 04:50

## 2021-05-05 RX ADMIN — METHYLPREDNISOLONE SODIUM SUCCINATE SCH MG: 40 INJECTION, POWDER, LYOPHILIZED, FOR SOLUTION INTRAMUSCULAR; INTRAVENOUS at 20:58

## 2021-05-05 RX ADMIN — DICYCLOMINE HYDROCHLORIDE SCH MG: 10 CAPSULE ORAL at 08:22

## 2021-05-05 RX ADMIN — Medication SCH MG: at 08:22

## 2021-05-05 RX ADMIN — HYDRALAZINE HYDROCHLORIDE SCH MG: 25 TABLET ORAL at 05:27

## 2021-05-05 RX ADMIN — SODIUM CHLORIDE SCH MLS/HR: 9 INJECTION, SOLUTION INTRAVENOUS at 20:58

## 2021-05-05 RX ADMIN — Medication SCH MG: at 20:58

## 2021-05-05 RX ADMIN — ONDANSETRON SCH MG: 4 TABLET, ORALLY DISINTEGRATING ORAL at 23:34

## 2021-05-05 RX ADMIN — POTASSIUM CHLORIDE SCH MEQ: 1500 TABLET, EXTENDED RELEASE ORAL at 08:23

## 2021-05-05 RX ADMIN — SERTRALINE HYDROCHLORIDE SCH MG: 50 TABLET, FILM COATED ORAL at 08:24

## 2021-05-05 RX ADMIN — Medication SCH MG: at 08:24

## 2021-05-05 RX ADMIN — HYDRALAZINE HYDROCHLORIDE SCH MG: 25 TABLET ORAL at 21:42

## 2021-05-05 RX ADMIN — ONDANSETRON SCH MG: 4 TABLET, ORALLY DISINTEGRATING ORAL at 00:00

## 2021-05-05 RX ADMIN — METRONIDAZOLE SCH MLS/HR: 500 INJECTION, SOLUTION INTRAVENOUS at 05:27

## 2021-05-05 RX ADMIN — METHYLPREDNISOLONE SODIUM SUCCINATE SCH MG: 40 INJECTION, POWDER, LYOPHILIZED, FOR SOLUTION INTRAMUSCULAR; INTRAVENOUS at 08:28

## 2021-05-05 RX ADMIN — ONDANSETRON SCH MG: 4 TABLET, ORALLY DISINTEGRATING ORAL at 12:00

## 2021-05-05 RX ADMIN — TOLTERODINE TARTRATE SCH MG: 4 CAPSULE, EXTENDED RELEASE ORAL at 08:23

## 2021-05-05 RX ADMIN — ZOLPIDEM TARTRATE PRN MG: 10 TABLET, FILM COATED ORAL at 23:34

## 2021-05-05 RX ADMIN — HYDRALAZINE HYDROCHLORIDE SCH MG: 25 TABLET ORAL at 13:36

## 2021-05-05 RX ADMIN — DICYCLOMINE HYDROCHLORIDE SCH MG: 10 CAPSULE ORAL at 20:58

## 2021-05-05 RX ADMIN — ONDANSETRON SCH MG: 4 TABLET, ORALLY DISINTEGRATING ORAL at 17:49

## 2021-05-05 RX ADMIN — METRONIDAZOLE SCH MLS/HR: 500 INJECTION, SOLUTION INTRAVENOUS at 21:42

## 2021-05-05 RX ADMIN — SODIUM CHLORIDE SCH MLS/HR: 4.5 INJECTION, SOLUTION INTRAVENOUS at 21:30

## 2021-05-05 RX ADMIN — APIXABAN SCH MG: 5 TABLET, FILM COATED ORAL at 08:21

## 2021-05-05 RX ADMIN — APIXABAN SCH MG: 5 TABLET, FILM COATED ORAL at 20:58

## 2021-05-05 RX ADMIN — SODIUM CHLORIDE SCH MG: 900 INJECTION INTRAVENOUS at 08:20

## 2021-05-06 VITALS — SYSTOLIC BLOOD PRESSURE: 123 MMHG | DIASTOLIC BLOOD PRESSURE: 71 MMHG

## 2021-05-06 VITALS — DIASTOLIC BLOOD PRESSURE: 71 MMHG | SYSTOLIC BLOOD PRESSURE: 133 MMHG

## 2021-05-06 VITALS — SYSTOLIC BLOOD PRESSURE: 131 MMHG | DIASTOLIC BLOOD PRESSURE: 77 MMHG

## 2021-05-06 VITALS — SYSTOLIC BLOOD PRESSURE: 133 MMHG | DIASTOLIC BLOOD PRESSURE: 71 MMHG

## 2021-05-06 VITALS — SYSTOLIC BLOOD PRESSURE: 115 MMHG | DIASTOLIC BLOOD PRESSURE: 63 MMHG

## 2021-05-06 VITALS — DIASTOLIC BLOOD PRESSURE: 77 MMHG | SYSTOLIC BLOOD PRESSURE: 131 MMHG

## 2021-05-06 VITALS — SYSTOLIC BLOOD PRESSURE: 139 MMHG | DIASTOLIC BLOOD PRESSURE: 76 MMHG

## 2021-05-06 VITALS — SYSTOLIC BLOOD PRESSURE: 137 MMHG | DIASTOLIC BLOOD PRESSURE: 73 MMHG

## 2021-05-06 VITALS — SYSTOLIC BLOOD PRESSURE: 132 MMHG | DIASTOLIC BLOOD PRESSURE: 98 MMHG

## 2021-05-06 VITALS — SYSTOLIC BLOOD PRESSURE: 136 MMHG | DIASTOLIC BLOOD PRESSURE: 74 MMHG

## 2021-05-06 LAB
ANION GAP SERPL CALC-SCNC: 12.4 MMOL/L (ref 8–16)
BASOPHILS # BLD AUTO: 0 10*3/UL (ref 0–0.1)
BASOPHILS NFR BLD AUTO: 0.2 % (ref 0–1)
BUN SERPL-MCNC: 11 MG/DL (ref 7–26)
BUN/CREAT SERPL: 19 (ref 6–25)
CALCIUM SERPL-MCNC: 7.8 MG/DL (ref 8.4–10.2)
CHLORIDE SERPL-SCNC: 105 MMOL/L (ref 98–107)
CO2 SERPL-SCNC: 26 MMOL/L (ref 22–29)
DEPRECATED NEUTROPHILS # BLD AUTO: 10.2 10*3/UL (ref 2.1–6.9)
DEPRECATED PHOSPHATE SERPL-MCNC: 3.1 MG/DL (ref 2.3–4.7)
EGFRCR SERPLBLD CKD-EPI 2021: > 60 ML/MIN (ref 60–?)
EOSINOPHIL # BLD AUTO: 0 10*3/UL (ref 0–0.4)
EOSINOPHIL NFR BLD AUTO: 0 % (ref 0–6)
ERYTHROCYTE [DISTWIDTH] IN CORD BLOOD: 17.7 % (ref 11.7–14.4)
GLUCOSE SERPLBLD-MCNC: 159 MG/DL (ref 74–118)
HCT VFR BLD AUTO: 33.5 % (ref 34.2–44.1)
HGB BLD-MCNC: 10.8 G/DL (ref 12–16)
LYMPHOCYTES # BLD: 1.1 10*3/UL (ref 1–3.2)
LYMPHOCYTES NFR BLD AUTO: 8.8 % (ref 18–39.1)
MAGNESIUM SERPL-MCNC: 1.5 MG/DL (ref 1.3–2.1)
MCH RBC QN AUTO: 29 PG (ref 28–32)
MCHC RBC AUTO-ENTMCNC: 32.2 G/DL (ref 31–35)
MCV RBC AUTO: 89.8 FL (ref 81–99)
MONOCYTES # BLD AUTO: 0.5 10*3/UL (ref 0.2–0.8)
MONOCYTES NFR BLD AUTO: 4 % (ref 4.4–11.3)
NEUTS SEG NFR BLD AUTO: 86.1 % (ref 38.7–80)
PLATELET # BLD AUTO: 358 X10E3/UL (ref 140–360)
POTASSIUM SERPL-SCNC: 4.4 MMOL/L (ref 3.5–5.1)
RBC # BLD AUTO: 3.73 X10E6/UL (ref 3.6–5.1)
SODIUM SERPL-SCNC: 139 MMOL/L (ref 136–145)

## 2021-05-06 RX ADMIN — HYDRALAZINE HYDROCHLORIDE SCH MG: 25 TABLET ORAL at 13:47

## 2021-05-06 RX ADMIN — ONDANSETRON SCH MG: 4 TABLET, ORALLY DISINTEGRATING ORAL at 17:17

## 2021-05-06 RX ADMIN — SERTRALINE HYDROCHLORIDE SCH MG: 50 TABLET, FILM COATED ORAL at 08:35

## 2021-05-06 RX ADMIN — Medication SCH MG: at 21:13

## 2021-05-06 RX ADMIN — SODIUM CHLORIDE SCH MG: 900 INJECTION INTRAVENOUS at 08:34

## 2021-05-06 RX ADMIN — METRONIDAZOLE SCH MLS/HR: 500 INJECTION, SOLUTION INTRAVENOUS at 05:45

## 2021-05-06 RX ADMIN — ZOLPIDEM TARTRATE PRN MG: 10 TABLET, FILM COATED ORAL at 20:59

## 2021-05-06 RX ADMIN — TOLTERODINE TARTRATE SCH MG: 4 CAPSULE, EXTENDED RELEASE ORAL at 08:35

## 2021-05-06 RX ADMIN — METRONIDAZOLE SCH MLS/HR: 500 INJECTION, SOLUTION INTRAVENOUS at 21:01

## 2021-05-06 RX ADMIN — DICYCLOMINE HYDROCHLORIDE SCH MG: 10 CAPSULE ORAL at 08:35

## 2021-05-06 RX ADMIN — ONDANSETRON SCH MG: 4 TABLET, ORALLY DISINTEGRATING ORAL at 05:45

## 2021-05-06 RX ADMIN — MESALAMINE SCH MG: 500 CAPSULE ORAL at 18:05

## 2021-05-06 RX ADMIN — APIXABAN SCH MG: 5 TABLET, FILM COATED ORAL at 21:00

## 2021-05-06 RX ADMIN — APIXABAN SCH MG: 5 TABLET, FILM COATED ORAL at 08:36

## 2021-05-06 RX ADMIN — HYDRALAZINE HYDROCHLORIDE SCH MG: 25 TABLET ORAL at 05:45

## 2021-05-06 RX ADMIN — METHYLPREDNISOLONE SODIUM SUCCINATE SCH MG: 40 INJECTION, POWDER, LYOPHILIZED, FOR SOLUTION INTRAMUSCULAR; INTRAVENOUS at 08:34

## 2021-05-06 RX ADMIN — Medication SCH MG: at 08:36

## 2021-05-06 RX ADMIN — ONDANSETRON SCH MG: 4 TABLET, ORALLY DISINTEGRATING ORAL at 10:47

## 2021-05-06 RX ADMIN — METHYLPREDNISOLONE SODIUM SUCCINATE SCH MG: 40 INJECTION, POWDER, LYOPHILIZED, FOR SOLUTION INTRAMUSCULAR; INTRAVENOUS at 21:01

## 2021-05-06 RX ADMIN — DICYCLOMINE HYDROCHLORIDE SCH MG: 10 CAPSULE ORAL at 21:00

## 2021-05-06 RX ADMIN — MESALAMINE SCH MG: 500 CAPSULE ORAL at 21:00

## 2021-05-06 RX ADMIN — SODIUM CHLORIDE SCH MLS/HR: 9 INJECTION, SOLUTION INTRAVENOUS at 21:58

## 2021-05-06 RX ADMIN — Medication SCH MG: at 08:35

## 2021-05-06 RX ADMIN — METRONIDAZOLE SCH MLS/HR: 500 INJECTION, SOLUTION INTRAVENOUS at 13:46

## 2021-05-06 RX ADMIN — HYDRALAZINE HYDROCHLORIDE SCH MG: 25 TABLET ORAL at 21:01

## 2021-05-06 RX ADMIN — POTASSIUM CHLORIDE SCH MEQ: 1500 TABLET, EXTENDED RELEASE ORAL at 08:35

## 2021-05-06 RX ADMIN — ONDANSETRON SCH MG: 4 TABLET, ORALLY DISINTEGRATING ORAL at 23:34

## 2021-05-06 RX ADMIN — DICYCLOMINE HYDROCHLORIDE SCH MG: 10 CAPSULE ORAL at 13:47

## 2021-05-06 RX ADMIN — SODIUM CHLORIDE SCH MLS/HR: 4.5 INJECTION, SOLUTION INTRAVENOUS at 13:47

## 2021-05-07 VITALS — SYSTOLIC BLOOD PRESSURE: 147 MMHG | DIASTOLIC BLOOD PRESSURE: 77 MMHG

## 2021-05-07 VITALS — DIASTOLIC BLOOD PRESSURE: 73 MMHG | SYSTOLIC BLOOD PRESSURE: 138 MMHG

## 2021-05-07 VITALS — SYSTOLIC BLOOD PRESSURE: 172 MMHG | DIASTOLIC BLOOD PRESSURE: 84 MMHG

## 2021-05-07 VITALS — DIASTOLIC BLOOD PRESSURE: 84 MMHG | SYSTOLIC BLOOD PRESSURE: 172 MMHG

## 2021-05-07 VITALS — SYSTOLIC BLOOD PRESSURE: 162 MMHG | DIASTOLIC BLOOD PRESSURE: 80 MMHG

## 2021-05-07 VITALS — SYSTOLIC BLOOD PRESSURE: 107 MMHG | DIASTOLIC BLOOD PRESSURE: 56 MMHG

## 2021-05-07 VITALS — SYSTOLIC BLOOD PRESSURE: 131 MMHG | DIASTOLIC BLOOD PRESSURE: 77 MMHG

## 2021-05-07 RX ADMIN — ONDANSETRON SCH MG: 4 TABLET, ORALLY DISINTEGRATING ORAL at 12:37

## 2021-05-07 RX ADMIN — METRONIDAZOLE SCH MLS/HR: 500 INJECTION, SOLUTION INTRAVENOUS at 14:44

## 2021-05-07 RX ADMIN — APIXABAN SCH MG: 5 TABLET, FILM COATED ORAL at 10:17

## 2021-05-07 RX ADMIN — SODIUM CHLORIDE SCH MLS/HR: 9 INJECTION, SOLUTION INTRAVENOUS at 21:10

## 2021-05-07 RX ADMIN — DICYCLOMINE HYDROCHLORIDE SCH MG: 10 CAPSULE ORAL at 21:04

## 2021-05-07 RX ADMIN — MESALAMINE SCH MG: 500 CAPSULE ORAL at 12:37

## 2021-05-07 RX ADMIN — SODIUM CHLORIDE SCH MLS/HR: 4.5 INJECTION, SOLUTION INTRAVENOUS at 18:30

## 2021-05-07 RX ADMIN — MESALAMINE SCH MG: 500 CAPSULE ORAL at 21:04

## 2021-05-07 RX ADMIN — ONDANSETRON SCH MG: 4 TABLET, ORALLY DISINTEGRATING ORAL at 05:32

## 2021-05-07 RX ADMIN — HYDRALAZINE HYDROCHLORIDE SCH MG: 25 TABLET ORAL at 05:29

## 2021-05-07 RX ADMIN — SODIUM CHLORIDE SCH MLS/HR: 4.5 INJECTION, SOLUTION INTRAVENOUS at 05:32

## 2021-05-07 RX ADMIN — METRONIDAZOLE SCH MLS/HR: 500 INJECTION, SOLUTION INTRAVENOUS at 21:58

## 2021-05-07 RX ADMIN — METHYLPREDNISOLONE SODIUM SUCCINATE SCH MG: 40 INJECTION, POWDER, LYOPHILIZED, FOR SOLUTION INTRAMUSCULAR; INTRAVENOUS at 10:17

## 2021-05-07 RX ADMIN — SERTRALINE HYDROCHLORIDE SCH MG: 50 TABLET, FILM COATED ORAL at 10:18

## 2021-05-07 RX ADMIN — DICYCLOMINE HYDROCHLORIDE SCH MG: 10 CAPSULE ORAL at 10:17

## 2021-05-07 RX ADMIN — ONDANSETRON SCH MG: 4 TABLET, ORALLY DISINTEGRATING ORAL at 05:38

## 2021-05-07 RX ADMIN — MESALAMINE SCH MG: 500 CAPSULE ORAL at 18:28

## 2021-05-07 RX ADMIN — DICYCLOMINE HYDROCHLORIDE SCH MG: 10 CAPSULE ORAL at 15:12

## 2021-05-07 RX ADMIN — APIXABAN SCH MG: 5 TABLET, FILM COATED ORAL at 21:04

## 2021-05-07 RX ADMIN — Medication SCH MG: at 10:17

## 2021-05-07 RX ADMIN — POTASSIUM CHLORIDE SCH MEQ: 1500 TABLET, EXTENDED RELEASE ORAL at 10:18

## 2021-05-07 RX ADMIN — METRONIDAZOLE SCH MLS/HR: 500 INJECTION, SOLUTION INTRAVENOUS at 05:28

## 2021-05-07 RX ADMIN — Medication SCH MG: at 10:18

## 2021-05-07 RX ADMIN — ONDANSETRON SCH MG: 4 TABLET, ORALLY DISINTEGRATING ORAL at 23:28

## 2021-05-07 RX ADMIN — ZOLPIDEM TARTRATE PRN MG: 10 TABLET, FILM COATED ORAL at 21:03

## 2021-05-07 RX ADMIN — SODIUM CHLORIDE SCH MG: 900 INJECTION INTRAVENOUS at 10:17

## 2021-05-07 RX ADMIN — MESALAMINE SCH MG: 500 CAPSULE ORAL at 10:18

## 2021-05-07 RX ADMIN — TOLTERODINE TARTRATE SCH MG: 4 CAPSULE, EXTENDED RELEASE ORAL at 10:17

## 2021-05-07 RX ADMIN — ONDANSETRON SCH MG: 4 TABLET, ORALLY DISINTEGRATING ORAL at 18:28

## 2021-05-07 RX ADMIN — HYDRALAZINE HYDROCHLORIDE SCH MG: 25 TABLET ORAL at 14:45

## 2021-05-08 VITALS — DIASTOLIC BLOOD PRESSURE: 72 MMHG | SYSTOLIC BLOOD PRESSURE: 141 MMHG

## 2021-05-08 VITALS — SYSTOLIC BLOOD PRESSURE: 136 MMHG | DIASTOLIC BLOOD PRESSURE: 80 MMHG

## 2021-05-08 VITALS — DIASTOLIC BLOOD PRESSURE: 81 MMHG | SYSTOLIC BLOOD PRESSURE: 150 MMHG

## 2021-05-08 VITALS — DIASTOLIC BLOOD PRESSURE: 83 MMHG | SYSTOLIC BLOOD PRESSURE: 144 MMHG

## 2021-05-08 RX ADMIN — METRONIDAZOLE SCH MLS/HR: 500 INJECTION, SOLUTION INTRAVENOUS at 05:13

## 2021-05-08 RX ADMIN — ONDANSETRON SCH MG: 4 TABLET, ORALLY DISINTEGRATING ORAL at 05:14

## 2021-05-08 RX ADMIN — Medication SCH MG: at 09:34

## 2021-05-08 RX ADMIN — POTASSIUM CHLORIDE SCH MEQ: 1500 TABLET, EXTENDED RELEASE ORAL at 09:34

## 2021-05-08 RX ADMIN — TOLTERODINE TARTRATE SCH MG: 4 CAPSULE, EXTENDED RELEASE ORAL at 09:34

## 2021-05-08 RX ADMIN — MESALAMINE SCH MG: 500 CAPSULE ORAL at 09:34

## 2021-05-08 RX ADMIN — ONDANSETRON SCH MG: 4 TABLET, ORALLY DISINTEGRATING ORAL at 12:34

## 2021-05-08 RX ADMIN — SODIUM CHLORIDE SCH MG: 900 INJECTION INTRAVENOUS at 09:34

## 2021-05-08 RX ADMIN — SERTRALINE HYDROCHLORIDE SCH MG: 50 TABLET, FILM COATED ORAL at 09:35

## 2021-05-08 RX ADMIN — MESALAMINE SCH MG: 500 CAPSULE ORAL at 12:34

## 2021-05-08 RX ADMIN — APIXABAN SCH MG: 5 TABLET, FILM COATED ORAL at 09:34

## 2021-05-08 RX ADMIN — DICYCLOMINE HYDROCHLORIDE SCH MG: 10 CAPSULE ORAL at 09:34

## 2021-11-17 ENCOUNTER — HOSPITAL ENCOUNTER (INPATIENT)
Dept: HOSPITAL 88 - ER | Age: 81
LOS: 1 days | Discharge: HOME | DRG: 389 | End: 2021-11-18
Attending: INTERNAL MEDICINE | Admitting: INTERNAL MEDICINE
Payer: MEDICARE

## 2021-11-17 VITALS — WEIGHT: 110 LBS | HEIGHT: 62 IN | BODY MASS INDEX: 20.24 KG/M2

## 2021-11-17 DIAGNOSIS — I48.0: ICD-10-CM

## 2021-11-17 DIAGNOSIS — Z79.01: ICD-10-CM

## 2021-11-17 DIAGNOSIS — Z20.822: ICD-10-CM

## 2021-11-17 DIAGNOSIS — I10: ICD-10-CM

## 2021-11-17 DIAGNOSIS — K50.90: ICD-10-CM

## 2021-11-17 DIAGNOSIS — K56.600: Primary | ICD-10-CM

## 2021-11-17 LAB
ALBUMIN SERPL-MCNC: 3.1 G/DL (ref 3.5–5)
ALBUMIN/GLOB SERPL: 0.9 {RATIO} (ref 0.8–2)
ALP SERPL-CCNC: 57 IU/L (ref 40–150)
ALT SERPL-CCNC: 10 IU/L (ref 0–55)
ANION GAP SERPL CALC-SCNC: 15.5 MMOL/L (ref 8–16)
BASOPHILS # BLD AUTO: 0.1 10*3/UL (ref 0–0.1)
BASOPHILS NFR BLD AUTO: 0.5 % (ref 0–1)
BUN SERPL-MCNC: 14 MG/DL (ref 7–26)
BUN/CREAT SERPL: 19 (ref 6–25)
CALCIUM SERPL-MCNC: 8.5 MG/DL (ref 8.4–10.2)
CHLORIDE SERPL-SCNC: 110 MMOL/L (ref 98–107)
CK MB SERPL-MCNC: 1.7 NG/ML (ref 0–5)
CK SERPL-CCNC: 36 IU/L (ref 29–168)
CO2 SERPL-SCNC: 20 MMOL/L (ref 22–29)
DEPRECATED NEUTROPHILS # BLD AUTO: 15.1 10*3/UL (ref 2.1–6.9)
EGFRCR SERPLBLD CKD-EPI 2021: 74 ML/MIN (ref 60–?)
EOSINOPHIL # BLD AUTO: 0.1 10*3/UL (ref 0–0.4)
EOSINOPHIL NFR BLD AUTO: 0.3 % (ref 0–6)
ERYTHROCYTE [DISTWIDTH] IN CORD BLOOD: 18.4 % (ref 11.7–14.4)
GLOBULIN PLAS-MCNC: 3.6 G/DL (ref 2.3–3.5)
GLUCOSE SERPLBLD-MCNC: 142 MG/DL (ref 74–118)
HCT VFR BLD AUTO: 33.6 % (ref 34.2–44.1)
HGB BLD-MCNC: 10.3 G/DL (ref 12–16)
LYMPHOCYTES # BLD: 1.2 10*3/UL (ref 1–3.2)
LYMPHOCYTES NFR BLD AUTO: 7 % (ref 18–39.1)
MCH RBC QN AUTO: 27.5 PG (ref 28–32)
MCHC RBC AUTO-ENTMCNC: 30.7 G/DL (ref 31–35)
MCV RBC AUTO: 89.6 FL (ref 81–99)
MONOCYTES # BLD AUTO: 0.7 10*3/UL (ref 0.2–0.8)
MONOCYTES NFR BLD AUTO: 4.1 % (ref 4.4–11.3)
NEUTS SEG NFR BLD AUTO: 87.5 % (ref 38.7–80)
PLATELET # BLD AUTO: 350 X10E3/UL (ref 140–360)
POTASSIUM SERPL-SCNC: 3.5 MMOL/L (ref 3.5–5.1)
RBC # BLD AUTO: 3.75 X10E6/UL (ref 3.6–5.1)
SODIUM SERPL-SCNC: 142 MMOL/L (ref 136–145)

## 2021-11-17 PROCEDURE — 71046 X-RAY EXAM CHEST 2 VIEWS: CPT

## 2021-11-17 PROCEDURE — 85025 COMPLETE CBC W/AUTO DIFF WBC: CPT

## 2021-11-17 PROCEDURE — 82550 ASSAY OF CK (CPK): CPT

## 2021-11-17 PROCEDURE — 83605 ASSAY OF LACTIC ACID: CPT

## 2021-11-17 PROCEDURE — 99284 EMERGENCY DEPT VISIT MOD MDM: CPT

## 2021-11-17 PROCEDURE — 74018 RADEX ABDOMEN 1 VIEW: CPT

## 2021-11-17 PROCEDURE — 94799 UNLISTED PULMONARY SVC/PX: CPT

## 2021-11-17 PROCEDURE — 36415 COLL VENOUS BLD VENIPUNCTURE: CPT

## 2021-11-17 PROCEDURE — 81001 URINALYSIS AUTO W/SCOPE: CPT

## 2021-11-17 PROCEDURE — 87040 BLOOD CULTURE FOR BACTERIA: CPT

## 2021-11-17 PROCEDURE — 93306 TTE W/DOPPLER COMPLETE: CPT

## 2021-11-17 PROCEDURE — 84484 ASSAY OF TROPONIN QUANT: CPT

## 2021-11-17 PROCEDURE — 93005 ELECTROCARDIOGRAM TRACING: CPT

## 2021-11-17 PROCEDURE — 74177 CT ABD & PELVIS W/CONTRAST: CPT

## 2021-11-17 PROCEDURE — 80053 COMPREHEN METABOLIC PANEL: CPT

## 2021-11-17 PROCEDURE — 82553 CREATINE MB FRACTION: CPT

## 2021-11-18 VITALS — DIASTOLIC BLOOD PRESSURE: 72 MMHG | SYSTOLIC BLOOD PRESSURE: 133 MMHG

## 2021-11-18 VITALS — SYSTOLIC BLOOD PRESSURE: 133 MMHG | DIASTOLIC BLOOD PRESSURE: 72 MMHG

## 2021-11-18 LAB
ALBUMIN SERPL-MCNC: 2.4 G/DL (ref 3.5–5)
ALBUMIN/GLOB SERPL: 0.8 {RATIO} (ref 0.8–2)
ALP SERPL-CCNC: 48 IU/L (ref 40–150)
ALT SERPL-CCNC: 11 IU/L (ref 0–55)
ANION GAP SERPL CALC-SCNC: 11.1 MMOL/L (ref 8–16)
BACTERIA URNS QL MICRO: (no result) /HPF
BASOPHILS # BLD AUTO: 0 10*3/UL (ref 0–0.1)
BASOPHILS NFR BLD AUTO: 0.5 % (ref 0–1)
BUN SERPL-MCNC: 10 MG/DL (ref 7–26)
BUN/CREAT SERPL: 15 (ref 6–25)
CALCIUM SERPL-MCNC: 7.9 MG/DL (ref 8.4–10.2)
CHLORIDE SERPL-SCNC: 116 MMOL/L (ref 98–107)
CK MB SERPL-MCNC: 1.1 NG/ML (ref 0–5)
CK MB SERPL-MCNC: 1.3 NG/ML (ref 0–5)
CK SERPL-CCNC: 24 IU/L (ref 29–168)
CK SERPL-CCNC: 28 IU/L (ref 29–168)
CLARITY UR: (no result)
CO2 SERPL-SCNC: 19 MMOL/L (ref 22–29)
COLOR UR: YELLOW
DEPRECATED NEUTROPHILS # BLD AUTO: 5.8 10*3/UL (ref 2.1–6.9)
DEPRECATED RBC URNS MANUAL-ACNC: (no result) /HPF (ref 0–5)
EGFRCR SERPLBLD CKD-EPI 2021: 87 ML/MIN (ref 60–?)
EOSINOPHIL # BLD AUTO: 0.1 10*3/UL (ref 0–0.4)
EOSINOPHIL NFR BLD AUTO: 0.8 % (ref 0–6)
EPI CELLS URNS QL MICRO: (no result) /LPF
ERYTHROCYTE [DISTWIDTH] IN CORD BLOOD: 18.3 % (ref 11.7–14.4)
GLOBULIN PLAS-MCNC: 3 G/DL (ref 2.3–3.5)
GLUCOSE SERPLBLD-MCNC: 133 MG/DL (ref 74–118)
HCT VFR BLD AUTO: 28.5 % (ref 34.2–44.1)
HGB BLD-MCNC: 8.7 G/DL (ref 12–16)
KETONES UR QL STRIP.AUTO: NEGATIVE
LEUKOCYTE ESTERASE UR QL STRIP.AUTO: NEGATIVE
LYMPHOCYTES # BLD: 1.1 10*3/UL (ref 1–3.2)
LYMPHOCYTES NFR BLD AUTO: 14.6 % (ref 18–39.1)
MCH RBC QN AUTO: 27.4 PG (ref 28–32)
MCHC RBC AUTO-ENTMCNC: 30.5 G/DL (ref 31–35)
MCV RBC AUTO: 89.6 FL (ref 81–99)
MONOCYTES # BLD AUTO: 0.5 10*3/UL (ref 0.2–0.8)
MONOCYTES NFR BLD AUTO: 6 % (ref 4.4–11.3)
NEUTS SEG NFR BLD AUTO: 77.6 % (ref 38.7–80)
NITRITE UR QL STRIP.AUTO: NEGATIVE
PLATELET # BLD AUTO: 279 X10E3/UL (ref 140–360)
POTASSIUM SERPL-SCNC: 3.1 MMOL/L (ref 3.5–5.1)
PROT UR QL STRIP.AUTO: NEGATIVE
RBC # BLD AUTO: 3.18 X10E6/UL (ref 3.6–5.1)
SODIUM SERPL-SCNC: 143 MMOL/L (ref 136–145)
SP GR UR STRIP: 1.02 (ref 1.01–1.02)
UROBILINOGEN UR STRIP-MCNC: 0.2 MG/DL (ref 0.2–1)
WBC #/AREA URNS HPF: (no result) /HPF (ref 0–5)

## 2021-11-18 RX ADMIN — DEXTROSE MONOHYDRATE SCH MLS/HR: 5 INJECTION INTRAVENOUS at 18:14

## 2021-11-18 RX ADMIN — DEXTROSE MONOHYDRATE SCH MLS/HR: 5 INJECTION INTRAVENOUS at 12:59

## 2022-01-24 ENCOUNTER — HOSPITAL ENCOUNTER (OUTPATIENT)
Dept: HOSPITAL 88 - ER | Age: 82
Setting detail: OBSERVATION
LOS: 2 days | Discharge: HOME | End: 2022-01-26
Attending: INTERNAL MEDICINE | Admitting: INTERNAL MEDICINE
Payer: MEDICARE

## 2022-01-24 VITALS — DIASTOLIC BLOOD PRESSURE: 88 MMHG | SYSTOLIC BLOOD PRESSURE: 151 MMHG

## 2022-01-24 VITALS — SYSTOLIC BLOOD PRESSURE: 151 MMHG | DIASTOLIC BLOOD PRESSURE: 88 MMHG

## 2022-01-24 VITALS — BODY MASS INDEX: 20.24 KG/M2 | HEIGHT: 62 IN | WEIGHT: 110 LBS

## 2022-01-24 DIAGNOSIS — Z20.822: ICD-10-CM

## 2022-01-24 DIAGNOSIS — Z79.01: ICD-10-CM

## 2022-01-24 DIAGNOSIS — I48.0: ICD-10-CM

## 2022-01-24 DIAGNOSIS — E83.42: ICD-10-CM

## 2022-01-24 DIAGNOSIS — K50.90: ICD-10-CM

## 2022-01-24 DIAGNOSIS — R00.2: ICD-10-CM

## 2022-01-24 DIAGNOSIS — N17.9: ICD-10-CM

## 2022-01-24 DIAGNOSIS — E87.6: ICD-10-CM

## 2022-01-24 DIAGNOSIS — A09: Primary | ICD-10-CM

## 2022-01-24 LAB
ALBUMIN SERPL-MCNC: 2.7 G/DL (ref 3.5–5)
ALBUMIN/GLOB SERPL: 0.8 {RATIO} (ref 0.8–2)
ALP SERPL-CCNC: 63 IU/L (ref 40–150)
ALT SERPL-CCNC: 10 IU/L (ref 0–55)
ANION GAP SERPL CALC-SCNC: 16.1 MMOL/L (ref 8–16)
BACTERIA URNS QL MICRO: (no result) /HPF
BASOPHILS # BLD AUTO: 0 10*3/UL (ref 0–0.1)
BASOPHILS NFR BLD AUTO: 0.4 % (ref 0–1)
BUN SERPL-MCNC: 21 MG/DL (ref 7–26)
BUN/CREAT SERPL: 20 (ref 6–25)
CALCIUM SERPL-MCNC: 7.5 MG/DL (ref 8.4–10.2)
CHLORIDE SERPL-SCNC: 111 MMOL/L (ref 98–107)
CLARITY UR: CLEAR
CO2 SERPL-SCNC: 19 MMOL/L (ref 22–29)
COLOR UR: YELLOW
DEPRECATED APTT PLAS QN: 34.9 SECONDS (ref 23.8–35.5)
DEPRECATED INR PLAS: 1.12
DEPRECATED NEUTROPHILS # BLD AUTO: 3.6 10*3/UL (ref 2.1–6.9)
DEPRECATED RBC URNS MANUAL-ACNC: (no result) /HPF (ref 0–5)
EGFRCR SERPLBLD CKD-EPI 2021: 50 ML/MIN (ref 60–?)
EOSINOPHIL # BLD AUTO: 0 10*3/UL (ref 0–0.4)
EOSINOPHIL NFR BLD AUTO: 0.6 % (ref 0–6)
EPI CELLS URNS QL MICRO: (no result) /LPF
ERYTHROCYTE [DISTWIDTH] IN CORD BLOOD: 16.6 % (ref 11.7–14.4)
GLOBULIN PLAS-MCNC: 3.6 G/DL (ref 2.3–3.5)
GLUCOSE SERPLBLD-MCNC: 133 MG/DL (ref 74–118)
HCT VFR BLD AUTO: 34.2 % (ref 34.2–44.1)
HGB BLD-MCNC: 10.3 G/DL (ref 12–16)
KETONES UR QL STRIP.AUTO: NEGATIVE
LEUKOCYTE ESTERASE UR QL STRIP.AUTO: (no result)
LYMPHOCYTES # BLD: 1 10*3/UL (ref 1–3.2)
LYMPHOCYTES NFR BLD AUTO: 18.5 % (ref 18–39.1)
MAGNESIUM SERPL-MCNC: 1 MG/DL (ref 1.3–2.1)
MCH RBC QN AUTO: 27.3 PG (ref 28–32)
MCHC RBC AUTO-ENTMCNC: 30.1 G/DL (ref 31–35)
MCV RBC AUTO: 90.7 FL (ref 81–99)
MONOCYTES # BLD AUTO: 0.5 10*3/UL (ref 0.2–0.8)
MONOCYTES NFR BLD AUTO: 10 % (ref 4.4–11.3)
MUCOUS THREADS URNS QL MICRO: (no result)
NEUTS SEG NFR BLD AUTO: 70.3 % (ref 38.7–80)
NITRITE UR QL STRIP.AUTO: NEGATIVE
PLATELET # BLD AUTO: 266 X10E3/UL (ref 140–360)
POTASSIUM SERPL-SCNC: 3.1 MMOL/L (ref 3.5–5.1)
PROT UR QL STRIP.AUTO: (no result)
PROTHROMBIN TIME: 15.2 SECONDS (ref 11.9–14.5)
RBC # BLD AUTO: 3.77 X10E6/UL (ref 3.6–5.1)
SODIUM SERPL-SCNC: 143 MMOL/L (ref 136–145)
SP GR UR STRIP: 1.02 (ref 1.01–1.02)
UROBILINOGEN UR STRIP-MCNC: 0.2 MG/DL (ref 0.2–1)
WBC #/AREA URNS HPF: (no result) /HPF (ref 0–5)

## 2022-01-24 PROCEDURE — 97530 THERAPEUTIC ACTIVITIES: CPT

## 2022-01-24 PROCEDURE — 74018 RADEX ABDOMEN 1 VIEW: CPT

## 2022-01-24 PROCEDURE — 81001 URINALYSIS AUTO W/SCOPE: CPT

## 2022-01-24 PROCEDURE — 83735 ASSAY OF MAGNESIUM: CPT

## 2022-01-24 PROCEDURE — 71045 X-RAY EXAM CHEST 1 VIEW: CPT

## 2022-01-24 PROCEDURE — 84132 ASSAY OF SERUM POTASSIUM: CPT

## 2022-01-24 PROCEDURE — 94799 UNLISTED PULMONARY SVC/PX: CPT

## 2022-01-24 PROCEDURE — 80053 COMPREHEN METABOLIC PANEL: CPT

## 2022-01-24 PROCEDURE — 80048 BASIC METABOLIC PNL TOTAL CA: CPT

## 2022-01-24 PROCEDURE — 97161 PT EVAL LOW COMPLEX 20 MIN: CPT

## 2022-01-24 PROCEDURE — 96361 HYDRATE IV INFUSION ADD-ON: CPT

## 2022-01-24 PROCEDURE — 36415 COLL VENOUS BLD VENIPUNCTURE: CPT

## 2022-01-24 PROCEDURE — 93005 ELECTROCARDIOGRAM TRACING: CPT

## 2022-01-24 PROCEDURE — 84484 ASSAY OF TROPONIN QUANT: CPT

## 2022-01-24 PROCEDURE — 96360 HYDRATION IV INFUSION INIT: CPT

## 2022-01-24 PROCEDURE — 85730 THROMBOPLASTIN TIME PARTIAL: CPT

## 2022-01-24 PROCEDURE — 99284 EMERGENCY DEPT VISIT MOD MDM: CPT

## 2022-01-24 PROCEDURE — 85610 PROTHROMBIN TIME: CPT

## 2022-01-24 PROCEDURE — 85025 COMPLETE CBC W/AUTO DIFF WBC: CPT

## 2022-01-24 RX ADMIN — SODIUM CHLORIDE SCH MLS/HR: 9 INJECTION, SOLUTION INTRAVENOUS at 18:24

## 2022-01-24 RX ADMIN — PIPERACILLIN SODIUM AND TAZOBACTAM SODIUM SCH MLS/HR: .25; 2 INJECTION, POWDER, LYOPHILIZED, FOR SOLUTION INTRAVENOUS at 18:24

## 2022-01-24 RX ADMIN — SODIUM CHLORIDE SCH MLS/HR: 9 INJECTION, SOLUTION INTRAVENOUS at 23:14

## 2022-01-24 RX ADMIN — ZOLPIDEM TARTRATE PRN MG: 10 TABLET, FILM COATED ORAL at 22:40

## 2022-01-24 RX ADMIN — Medication SCH MG: at 17:51

## 2022-01-24 RX ADMIN — HYDRALAZINE HYDROCHLORIDE SCH MG: 25 TABLET ORAL at 23:14

## 2022-01-24 RX ADMIN — DICYCLOMINE HYDROCHLORIDE SCH MG: 10 CAPSULE ORAL at 20:52

## 2022-01-24 RX ADMIN — SIMVASTATIN SCH MG: 20 TABLET, FILM COATED ORAL at 20:52

## 2022-01-25 VITALS — SYSTOLIC BLOOD PRESSURE: 131 MMHG | DIASTOLIC BLOOD PRESSURE: 59 MMHG

## 2022-01-25 VITALS — SYSTOLIC BLOOD PRESSURE: 95 MMHG | DIASTOLIC BLOOD PRESSURE: 55 MMHG

## 2022-01-25 VITALS — SYSTOLIC BLOOD PRESSURE: 133 MMHG | DIASTOLIC BLOOD PRESSURE: 77 MMHG

## 2022-01-25 VITALS — DIASTOLIC BLOOD PRESSURE: 66 MMHG | SYSTOLIC BLOOD PRESSURE: 111 MMHG

## 2022-01-25 VITALS — DIASTOLIC BLOOD PRESSURE: 84 MMHG | SYSTOLIC BLOOD PRESSURE: 178 MMHG

## 2022-01-25 VITALS — DIASTOLIC BLOOD PRESSURE: 55 MMHG | SYSTOLIC BLOOD PRESSURE: 95 MMHG

## 2022-01-25 VITALS — SYSTOLIC BLOOD PRESSURE: 108 MMHG | DIASTOLIC BLOOD PRESSURE: 59 MMHG

## 2022-01-25 LAB
ANION GAP SERPL CALC-SCNC: 10.4 MMOL/L (ref 8–16)
BASOPHILS # BLD AUTO: 0 10*3/UL (ref 0–0.1)
BASOPHILS NFR BLD AUTO: 0.6 % (ref 0–1)
BUN SERPL-MCNC: 15 MG/DL (ref 7–26)
BUN/CREAT SERPL: 25 (ref 6–25)
CALCIUM SERPL-MCNC: 7.2 MG/DL (ref 8.4–10.2)
CHLORIDE SERPL-SCNC: 116 MMOL/L (ref 98–107)
CO2 SERPL-SCNC: 18 MMOL/L (ref 22–29)
DEPRECATED NEUTROPHILS # BLD AUTO: 3 10*3/UL (ref 2.1–6.9)
EGFRCR SERPLBLD CKD-EPI 2021: 96 ML/MIN (ref 60–?)
EOSINOPHIL # BLD AUTO: 0 10*3/UL (ref 0–0.4)
EOSINOPHIL NFR BLD AUTO: 0.9 % (ref 0–6)
ERYTHROCYTE [DISTWIDTH] IN CORD BLOOD: 16.3 % (ref 11.7–14.4)
GLUCOSE SERPLBLD-MCNC: 101 MG/DL (ref 74–118)
HCT VFR BLD AUTO: 27.9 % (ref 34.2–44.1)
HGB BLD-MCNC: 8.7 G/DL (ref 12–16)
LYMPHOCYTES # BLD: 1 10*3/UL (ref 1–3.2)
LYMPHOCYTES NFR BLD AUTO: 21.9 % (ref 18–39.1)
MCH RBC QN AUTO: 27.1 PG (ref 28–32)
MCHC RBC AUTO-ENTMCNC: 31.2 G/DL (ref 31–35)
MCV RBC AUTO: 86.9 FL (ref 81–99)
MONOCYTES # BLD AUTO: 0.6 10*3/UL (ref 0.2–0.8)
MONOCYTES NFR BLD AUTO: 12.7 % (ref 4.4–11.3)
NEUTS SEG NFR BLD AUTO: 63.7 % (ref 38.7–80)
PLATELET # BLD AUTO: 248 X10E3/UL (ref 140–360)
POTASSIUM SERPL-SCNC: 2.4 MMOL/L (ref 3.5–5.1)
RBC # BLD AUTO: 3.21 X10E6/UL (ref 3.6–5.1)
SODIUM SERPL-SCNC: 142 MMOL/L (ref 136–145)

## 2022-01-25 RX ADMIN — PIPERACILLIN SODIUM AND TAZOBACTAM SODIUM SCH MLS/HR: .25; 2 INJECTION, POWDER, LYOPHILIZED, FOR SOLUTION INTRAVENOUS at 12:13

## 2022-01-25 RX ADMIN — DICYCLOMINE HYDROCHLORIDE SCH MG: 10 CAPSULE ORAL at 21:32

## 2022-01-25 RX ADMIN — HYDRALAZINE HYDROCHLORIDE SCH MG: 25 TABLET ORAL at 21:33

## 2022-01-25 RX ADMIN — SIMVASTATIN SCH MG: 20 TABLET, FILM COATED ORAL at 21:32

## 2022-01-25 RX ADMIN — LEVOTHYROXINE SODIUM SCH MCG: 50 TABLET ORAL at 06:08

## 2022-01-25 RX ADMIN — SODIUM CHLORIDE SCH MLS/HR: 9 INJECTION, SOLUTION INTRAVENOUS at 09:44

## 2022-01-25 RX ADMIN — PIPERACILLIN SODIUM AND TAZOBACTAM SODIUM SCH MLS/HR: .25; 2 INJECTION, POWDER, LYOPHILIZED, FOR SOLUTION INTRAVENOUS at 06:08

## 2022-01-25 RX ADMIN — POTASSIUM CHLORIDE SCH MEQ: 1500 TABLET, EXTENDED RELEASE ORAL at 09:44

## 2022-01-25 RX ADMIN — SERTRALINE HYDROCHLORIDE SCH MG: 50 TABLET, FILM COATED ORAL at 09:45

## 2022-01-25 RX ADMIN — DICYCLOMINE HYDROCHLORIDE SCH MG: 10 CAPSULE ORAL at 15:08

## 2022-01-25 RX ADMIN — SODIUM CHLORIDE SCH MLS/HR: 9 INJECTION, SOLUTION INTRAVENOUS at 21:47

## 2022-01-25 RX ADMIN — HYDRALAZINE HYDROCHLORIDE SCH MG: 25 TABLET ORAL at 15:08

## 2022-01-25 RX ADMIN — PIPERACILLIN SODIUM AND TAZOBACTAM SODIUM SCH MLS/HR: .25; 2 INJECTION, POWDER, LYOPHILIZED, FOR SOLUTION INTRAVENOUS at 00:30

## 2022-01-25 RX ADMIN — MESALAMINE SCH MG: 400 TABLET, DELAYED RELEASE ORAL at 09:44

## 2022-01-25 RX ADMIN — HYDRALAZINE HYDROCHLORIDE SCH MG: 25 TABLET ORAL at 06:08

## 2022-01-25 RX ADMIN — TOLTERODINE TARTRATE SCH MG: 4 CAPSULE, EXTENDED RELEASE ORAL at 09:44

## 2022-01-25 RX ADMIN — ZOLPIDEM TARTRATE PRN MG: 10 TABLET, FILM COATED ORAL at 21:30

## 2022-01-25 RX ADMIN — Medication SCH MG: at 09:00

## 2022-01-25 RX ADMIN — METOPROLOL TARTRATE SCH MG: 50 TABLET, FILM COATED ORAL at 22:30

## 2022-01-25 RX ADMIN — PIPERACILLIN SODIUM AND TAZOBACTAM SODIUM SCH MLS/HR: .25; 2 INJECTION, POWDER, LYOPHILIZED, FOR SOLUTION INTRAVENOUS at 17:48

## 2022-01-25 RX ADMIN — SODIUM CHLORIDE SCH MLS/HR: 9 INJECTION, SOLUTION INTRAVENOUS at 17:48

## 2022-01-25 RX ADMIN — DICYCLOMINE HYDROCHLORIDE SCH MG: 10 CAPSULE ORAL at 09:44

## 2022-01-25 RX ADMIN — PANTOPRAZOLE SODIUM SCH MG: 40 TABLET, DELAYED RELEASE ORAL at 09:45

## 2022-01-26 VITALS — DIASTOLIC BLOOD PRESSURE: 86 MMHG | SYSTOLIC BLOOD PRESSURE: 158 MMHG

## 2022-01-26 VITALS — SYSTOLIC BLOOD PRESSURE: 157 MMHG | DIASTOLIC BLOOD PRESSURE: 84 MMHG

## 2022-01-26 VITALS — SYSTOLIC BLOOD PRESSURE: 161 MMHG | DIASTOLIC BLOOD PRESSURE: 95 MMHG

## 2022-01-26 VITALS — DIASTOLIC BLOOD PRESSURE: 86 MMHG | SYSTOLIC BLOOD PRESSURE: 148 MMHG

## 2022-01-26 VITALS — DIASTOLIC BLOOD PRESSURE: 84 MMHG | SYSTOLIC BLOOD PRESSURE: 157 MMHG

## 2022-01-26 LAB
ANION GAP SERPL CALC-SCNC: 10.7 MMOL/L (ref 8–16)
BASOPHILS # BLD AUTO: 0 10*3/UL (ref 0–0.1)
BASOPHILS NFR BLD AUTO: 0.6 % (ref 0–1)
BUN SERPL-MCNC: 10 MG/DL (ref 7–26)
BUN/CREAT SERPL: 17 (ref 6–25)
CALCIUM SERPL-MCNC: 7.3 MG/DL (ref 8.4–10.2)
CHLORIDE SERPL-SCNC: 118 MMOL/L (ref 98–107)
CO2 SERPL-SCNC: 16 MMOL/L (ref 22–29)
DEPRECATED NEUTROPHILS # BLD AUTO: 5 10*3/UL (ref 2.1–6.9)
EGFRCR SERPLBLD CKD-EPI 2021: 100 ML/MIN (ref 60–?)
EOSINOPHIL # BLD AUTO: 0 10*3/UL (ref 0–0.4)
EOSINOPHIL NFR BLD AUTO: 0.5 % (ref 0–6)
ERYTHROCYTE [DISTWIDTH] IN CORD BLOOD: 16.3 % (ref 11.7–14.4)
GLUCOSE SERPLBLD-MCNC: 108 MG/DL (ref 74–118)
HCT VFR BLD AUTO: 30.2 % (ref 34.2–44.1)
HGB BLD-MCNC: 9.4 G/DL (ref 12–16)
LYMPHOCYTES # BLD: 0.9 10*3/UL (ref 1–3.2)
LYMPHOCYTES NFR BLD AUTO: 12.8 % (ref 18–39.1)
MCH RBC QN AUTO: 27.4 PG (ref 28–32)
MCHC RBC AUTO-ENTMCNC: 31.1 G/DL (ref 31–35)
MCV RBC AUTO: 88 FL (ref 81–99)
MONOCYTES # BLD AUTO: 0.7 10*3/UL (ref 0.2–0.8)
MONOCYTES NFR BLD AUTO: 10.7 % (ref 4.4–11.3)
NEUTS SEG NFR BLD AUTO: 75.1 % (ref 38.7–80)
PLATELET # BLD AUTO: 288 X10E3/UL (ref 140–360)
POTASSIUM SERPL-SCNC: 3.7 MMOL/L (ref 3.5–5.1)
RBC # BLD AUTO: 3.43 X10E6/UL (ref 3.6–5.1)
SODIUM SERPL-SCNC: 141 MMOL/L (ref 136–145)

## 2022-01-26 RX ADMIN — MESALAMINE SCH MG: 400 TABLET, DELAYED RELEASE ORAL at 09:51

## 2022-01-26 RX ADMIN — LEVOTHYROXINE SODIUM SCH MCG: 50 TABLET ORAL at 06:35

## 2022-01-26 RX ADMIN — PANTOPRAZOLE SODIUM SCH MG: 40 TABLET, DELAYED RELEASE ORAL at 09:58

## 2022-01-26 RX ADMIN — Medication SCH MG: at 09:51

## 2022-01-26 RX ADMIN — METOPROLOL TARTRATE SCH MG: 50 TABLET, FILM COATED ORAL at 06:35

## 2022-01-26 RX ADMIN — POTASSIUM CHLORIDE SCH MEQ: 1500 TABLET, EXTENDED RELEASE ORAL at 09:51

## 2022-01-26 RX ADMIN — DICYCLOMINE HYDROCHLORIDE SCH MG: 10 CAPSULE ORAL at 09:57

## 2022-01-26 RX ADMIN — SODIUM CHLORIDE SCH MLS/HR: 9 INJECTION, SOLUTION INTRAVENOUS at 08:00

## 2022-01-26 RX ADMIN — SERTRALINE HYDROCHLORIDE SCH MG: 50 TABLET, FILM COATED ORAL at 09:58

## 2022-01-26 RX ADMIN — SODIUM BICARBONATE SCH MG: 650 TABLET ORAL at 06:35

## 2022-01-26 RX ADMIN — PIPERACILLIN SODIUM AND TAZOBACTAM SODIUM SCH MLS/HR: .25; 2 INJECTION, POWDER, LYOPHILIZED, FOR SOLUTION INTRAVENOUS at 00:05

## 2022-01-26 RX ADMIN — SODIUM BICARBONATE SCH MG: 650 TABLET ORAL at 09:58

## 2022-01-26 RX ADMIN — TOLTERODINE TARTRATE SCH MG: 4 CAPSULE, EXTENDED RELEASE ORAL at 09:58

## 2022-01-26 RX ADMIN — PIPERACILLIN SODIUM AND TAZOBACTAM SODIUM SCH MLS/HR: .25; 2 INJECTION, POWDER, LYOPHILIZED, FOR SOLUTION INTRAVENOUS at 06:34

## 2022-08-06 ENCOUNTER — HOSPITAL ENCOUNTER (INPATIENT)
Dept: HOSPITAL 88 - ER | Age: 82
LOS: 4 days | Discharge: HOME | DRG: 389 | End: 2022-08-10
Attending: INTERNAL MEDICINE | Admitting: INTERNAL MEDICINE
Payer: MEDICARE

## 2022-08-06 VITALS — SYSTOLIC BLOOD PRESSURE: 103 MMHG | DIASTOLIC BLOOD PRESSURE: 53 MMHG

## 2022-08-06 VITALS — DIASTOLIC BLOOD PRESSURE: 65 MMHG | SYSTOLIC BLOOD PRESSURE: 118 MMHG

## 2022-08-06 VITALS — HEIGHT: 62 IN | WEIGHT: 102 LBS | BODY MASS INDEX: 18.77 KG/M2

## 2022-08-06 DIAGNOSIS — R15.2: ICD-10-CM

## 2022-08-06 DIAGNOSIS — Z96.652: ICD-10-CM

## 2022-08-06 DIAGNOSIS — E03.9: ICD-10-CM

## 2022-08-06 DIAGNOSIS — K50.90: ICD-10-CM

## 2022-08-06 DIAGNOSIS — D64.9: ICD-10-CM

## 2022-08-06 DIAGNOSIS — I48.20: ICD-10-CM

## 2022-08-06 DIAGNOSIS — Z20.822: ICD-10-CM

## 2022-08-06 DIAGNOSIS — I10: ICD-10-CM

## 2022-08-06 DIAGNOSIS — N17.9: ICD-10-CM

## 2022-08-06 DIAGNOSIS — K92.2: ICD-10-CM

## 2022-08-06 DIAGNOSIS — R00.0: ICD-10-CM

## 2022-08-06 DIAGNOSIS — Z79.01: ICD-10-CM

## 2022-08-06 DIAGNOSIS — R01.1: ICD-10-CM

## 2022-08-06 DIAGNOSIS — K56.600: Primary | ICD-10-CM

## 2022-08-06 DIAGNOSIS — E86.0: ICD-10-CM

## 2022-08-06 DIAGNOSIS — I35.0: ICD-10-CM

## 2022-08-06 LAB
ALBUMIN SERPL-MCNC: 2.7 G/DL (ref 3.5–5)
ALBUMIN/GLOB SERPL: 0.6 {RATIO} (ref 0.8–2)
ALP SERPL-CCNC: 70 IU/L (ref 40–150)
ALT SERPL-CCNC: 10 IU/L (ref 0–55)
ANION GAP SERPL CALC-SCNC: 14.8 MMOL/L (ref 8–16)
BASOPHILS # BLD AUTO: 0 10*3/UL (ref 0–0.1)
BASOPHILS NFR BLD AUTO: 0.6 % (ref 0–1)
BUN SERPL-MCNC: 16 MG/DL (ref 7–26)
BUN/CREAT SERPL: 16 (ref 6–25)
CALCIUM SERPL-MCNC: 7.9 MG/DL (ref 8.4–10.2)
CHLORIDE SERPL-SCNC: 111 MMOL/L (ref 98–107)
CK MB SERPL-MCNC: 1.8 NG/ML (ref 0–5)
CK SERPL-CCNC: 24 IU/L (ref 29–168)
CO2 SERPL-SCNC: 21 MMOL/L (ref 22–29)
DEPRECATED NEUTROPHILS # BLD AUTO: 4.9 10*3/UL (ref 2.1–6.9)
EOSINOPHIL # BLD AUTO: 0 10*3/UL (ref 0–0.4)
EOSINOPHIL NFR BLD AUTO: 0.3 % (ref 0–6)
ERYTHROCYTE [DISTWIDTH] IN CORD BLOOD: 16.6 % (ref 11.7–14.4)
GLOBULIN PLAS-MCNC: 4.2 G/DL (ref 2.3–3.5)
GLUCOSE SERPLBLD-MCNC: 171 MG/DL (ref 74–118)
HCT VFR BLD AUTO: 32.7 % (ref 34.2–44.1)
HGB BLD-MCNC: 10.1 G/DL (ref 12–16)
LYMPHOCYTES # BLD: 1 10*3/UL (ref 1–3.2)
LYMPHOCYTES NFR BLD AUTO: 14.7 % (ref 18–39.1)
MCH RBC QN AUTO: 27.3 PG (ref 28–32)
MCHC RBC AUTO-ENTMCNC: 30.9 G/DL (ref 31–35)
MCV RBC AUTO: 88.4 FL (ref 81–99)
MONOCYTES # BLD AUTO: 0.5 10*3/UL (ref 0.2–0.8)
MONOCYTES NFR BLD AUTO: 7.2 % (ref 4.4–11.3)
NEUTS SEG NFR BLD AUTO: 75.7 % (ref 38.7–80)
PLATELET # BLD AUTO: 341 X10E3/UL (ref 140–360)
POTASSIUM SERPL-SCNC: 3.8 MMOL/L (ref 3.5–5.1)
RBC # BLD AUTO: 3.7 X10E6/UL (ref 3.6–5.1)
SODIUM SERPL-SCNC: 143 MMOL/L (ref 136–145)

## 2022-08-06 PROCEDURE — 80053 COMPREHEN METABOLIC PANEL: CPT

## 2022-08-06 PROCEDURE — 36415 COLL VENOUS BLD VENIPUNCTURE: CPT

## 2022-08-06 PROCEDURE — 87045 FECES CULTURE AEROBIC BACT: CPT

## 2022-08-06 PROCEDURE — 83540 ASSAY OF IRON: CPT

## 2022-08-06 PROCEDURE — 83630 LACTOFERRIN FECAL (QUAL): CPT

## 2022-08-06 PROCEDURE — 82746 ASSAY OF FOLIC ACID SERUM: CPT

## 2022-08-06 PROCEDURE — 94760 N-INVAS EAR/PLS OXIMETRY 1: CPT

## 2022-08-06 PROCEDURE — 86671 FUNGUS NES ANTIBODY: CPT

## 2022-08-06 PROCEDURE — 99284 EMERGENCY DEPT VISIT MOD MDM: CPT

## 2022-08-06 PROCEDURE — 96361 HYDRATE IV INFUSION ADD-ON: CPT

## 2022-08-06 PROCEDURE — 93306 TTE W/DOPPLER COMPLETE: CPT

## 2022-08-06 PROCEDURE — 87177 OVA AND PARASITES SMEARS: CPT

## 2022-08-06 PROCEDURE — 83735 ASSAY OF MAGNESIUM: CPT

## 2022-08-06 PROCEDURE — 74177 CT ABD & PELVIS W/CONTRAST: CPT

## 2022-08-06 PROCEDURE — 93005 ELECTROCARDIOGRAM TRACING: CPT

## 2022-08-06 PROCEDURE — 82550 ASSAY OF CK (CPK): CPT

## 2022-08-06 PROCEDURE — 85045 AUTOMATED RETICULOCYTE COUNT: CPT

## 2022-08-06 PROCEDURE — 83690 ASSAY OF LIPASE: CPT

## 2022-08-06 PROCEDURE — 85014 HEMATOCRIT: CPT

## 2022-08-06 PROCEDURE — 86140 C-REACTIVE PROTEIN: CPT

## 2022-08-06 PROCEDURE — 82270 OCCULT BLOOD FECES: CPT

## 2022-08-06 PROCEDURE — 80048 BASIC METABOLIC PNL TOTAL CA: CPT

## 2022-08-06 PROCEDURE — 84100 ASSAY OF PHOSPHORUS: CPT

## 2022-08-06 PROCEDURE — 85018 HEMOGLOBIN: CPT

## 2022-08-06 PROCEDURE — 87324 CLOSTRIDIUM AG IA: CPT

## 2022-08-06 PROCEDURE — 82553 CREATINE MB FRACTION: CPT

## 2022-08-06 PROCEDURE — 0223U NFCT DS 22 TRGT SARS-COV-2: CPT

## 2022-08-06 PROCEDURE — 82607 VITAMIN B-12: CPT

## 2022-08-06 PROCEDURE — 85651 RBC SED RATE NONAUTOMATED: CPT

## 2022-08-06 PROCEDURE — 87449 NOS EACH ORGANISM AG IA: CPT

## 2022-08-06 PROCEDURE — 86256 FLUORESCENT ANTIBODY TITER: CPT

## 2022-08-06 PROCEDURE — 83993 ASSAY FOR CALPROTECTIN FECAL: CPT

## 2022-08-06 PROCEDURE — 84466 ASSAY OF TRANSFERRIN: CPT

## 2022-08-06 PROCEDURE — 85025 COMPLETE CBC W/AUTO DIFF WBC: CPT

## 2022-08-06 PROCEDURE — 84484 ASSAY OF TROPONIN QUANT: CPT

## 2022-08-06 RX ADMIN — DILTIAZEM HYDROCHLORIDE SCH MG: 30 TABLET, FILM COATED ORAL at 22:42

## 2022-08-06 RX ADMIN — Medication SCH MG: at 22:42

## 2022-08-06 RX ADMIN — SODIUM CHLORIDE SCH MLS/HR: 4.5 INJECTION, SOLUTION INTRAVENOUS at 22:41

## 2022-08-06 RX ADMIN — DILTIAZEM HYDROCHLORIDE SCH MG: 30 TABLET, FILM COATED ORAL at 17:09

## 2022-08-07 VITALS — DIASTOLIC BLOOD PRESSURE: 58 MMHG | SYSTOLIC BLOOD PRESSURE: 118 MMHG

## 2022-08-07 VITALS — DIASTOLIC BLOOD PRESSURE: 53 MMHG | SYSTOLIC BLOOD PRESSURE: 95 MMHG

## 2022-08-07 VITALS — SYSTOLIC BLOOD PRESSURE: 99 MMHG | DIASTOLIC BLOOD PRESSURE: 52 MMHG

## 2022-08-07 VITALS — DIASTOLIC BLOOD PRESSURE: 54 MMHG | SYSTOLIC BLOOD PRESSURE: 95 MMHG

## 2022-08-07 VITALS — SYSTOLIC BLOOD PRESSURE: 118 MMHG | DIASTOLIC BLOOD PRESSURE: 58 MMHG

## 2022-08-07 VITALS — SYSTOLIC BLOOD PRESSURE: 104 MMHG | DIASTOLIC BLOOD PRESSURE: 54 MMHG

## 2022-08-07 VITALS — DIASTOLIC BLOOD PRESSURE: 50 MMHG | SYSTOLIC BLOOD PRESSURE: 111 MMHG

## 2022-08-07 LAB
ALBUMIN SERPL-MCNC: 2.1 G/DL (ref 3.5–5)
ALBUMIN/GLOB SERPL: 0.7 {RATIO} (ref 0.8–2)
ALP SERPL-CCNC: 54 IU/L (ref 40–150)
ALT SERPL-CCNC: 7 IU/L (ref 0–55)
ANION GAP SERPL CALC-SCNC: 11.5 MMOL/L (ref 8–16)
BASOPHILS # BLD AUTO: 0 10*3/UL (ref 0–0.1)
BASOPHILS NFR BLD AUTO: 0.5 % (ref 0–1)
BUN SERPL-MCNC: 17 MG/DL (ref 7–26)
BUN/CREAT SERPL: 24 (ref 6–25)
CALCIUM SERPL-MCNC: 7 MG/DL (ref 8.4–10.2)
CHLORIDE SERPL-SCNC: 115 MMOL/L (ref 98–107)
CK MB SERPL-MCNC: 1.1 NG/ML (ref 0–5)
CK MB SERPL-MCNC: 1.3 NG/ML (ref 0–5)
CK SERPL-CCNC: 24 IU/L (ref 29–168)
CK SERPL-CCNC: 25 IU/L (ref 29–168)
CO2 SERPL-SCNC: 18 MMOL/L (ref 22–29)
DEPRECATED NEUTROPHILS # BLD AUTO: 5.7 10*3/UL (ref 2.1–6.9)
DEPRECATED PHOSPHATE SERPL-MCNC: 3.7 MG/DL (ref 2.3–4.7)
EOSINOPHIL # BLD AUTO: 0 10*3/UL (ref 0–0.4)
EOSINOPHIL NFR BLD AUTO: 0.5 % (ref 0–6)
EOSINOPHIL NFR BLD MANUAL: 1 % (ref 0–7)
ERYTHROCYTE [DISTWIDTH] IN CORD BLOOD: 16.6 % (ref 11.7–14.4)
GLOBULIN PLAS-MCNC: 3.1 G/DL (ref 2.3–3.5)
GLUCOSE SERPLBLD-MCNC: 82 MG/DL (ref 74–118)
HCT VFR BLD AUTO: 25.7 % (ref 34.2–44.1)
HGB BLD-MCNC: 8 G/DL (ref 12–16)
IRON SATN MFR SERPL: 12 % (ref 15–50)
IRON SERPL-MCNC: 31 UG/DL (ref 50–170)
LYMPHOCYTES # BLD: 1.1 10*3/UL (ref 1–3.2)
LYMPHOCYTES NFR BLD AUTO: 13.9 % (ref 18–39.1)
LYMPHOCYTES NFR BLD MANUAL: 18 % (ref 19–48)
MAGNESIUM SERPL-MCNC: 1 MG/DL (ref 1.3–2.1)
MCH RBC QN AUTO: 27.1 PG (ref 28–32)
MCHC RBC AUTO-ENTMCNC: 31.1 G/DL (ref 31–35)
MCV RBC AUTO: 87.1 FL (ref 81–99)
MONOCYTES # BLD AUTO: 0.7 10*3/UL (ref 0.2–0.8)
MONOCYTES NFR BLD AUTO: 9.3 % (ref 4.4–11.3)
MONOCYTES NFR BLD MANUAL: 3 % (ref 3.4–9)
NEUTS BAND NFR BLD MANUAL: 18 %
NEUTS SEG NFR BLD AUTO: 75.3 % (ref 38.7–80)
NEUTS SEG NFR BLD MANUAL: 59 % (ref 40–74)
PLAT MORPH BLD: (no result)
PLATELET # BLD AUTO: 256 X10E3/UL (ref 140–360)
POTASSIUM SERPL-SCNC: 3.5 MMOL/L (ref 3.5–5.1)
RBC # BLD AUTO: 2.95 X10E6/UL (ref 3.6–5.1)
RBC MORPH BLD: NORMAL
SODIUM SERPL-SCNC: 141 MMOL/L (ref 136–145)
TIBC SERPL-MCNC: 256 UG/DL (ref 261–478)
TRANSFERRIN SERPL-MCNC: 183 MG/DL (ref 180–382)

## 2022-08-07 RX ADMIN — DILTIAZEM HYDROCHLORIDE SCH MG: 30 TABLET, FILM COATED ORAL at 12:10

## 2022-08-07 RX ADMIN — SODIUM CHLORIDE SCH MLS/HR: 4.5 INJECTION, SOLUTION INTRAVENOUS at 14:10

## 2022-08-07 RX ADMIN — ZOLPIDEM TARTRATE PRN MG: 5 TABLET, FILM COATED ORAL at 22:41

## 2022-08-07 RX ADMIN — DILTIAZEM HYDROCHLORIDE SCH MG: 30 TABLET, FILM COATED ORAL at 06:00

## 2022-08-07 RX ADMIN — Medication SCH MG: at 21:00

## 2022-08-07 RX ADMIN — METOPROLOL SUCCINATE SCH MG: 50 TABLET, EXTENDED RELEASE ORAL at 14:10

## 2022-08-08 VITALS — DIASTOLIC BLOOD PRESSURE: 71 MMHG | SYSTOLIC BLOOD PRESSURE: 163 MMHG

## 2022-08-08 VITALS — DIASTOLIC BLOOD PRESSURE: 72 MMHG | SYSTOLIC BLOOD PRESSURE: 155 MMHG

## 2022-08-08 VITALS — DIASTOLIC BLOOD PRESSURE: 74 MMHG | SYSTOLIC BLOOD PRESSURE: 157 MMHG

## 2022-08-08 VITALS — DIASTOLIC BLOOD PRESSURE: 61 MMHG | SYSTOLIC BLOOD PRESSURE: 121 MMHG

## 2022-08-08 VITALS — SYSTOLIC BLOOD PRESSURE: 113 MMHG | DIASTOLIC BLOOD PRESSURE: 62 MMHG

## 2022-08-08 LAB
ANION GAP SERPL CALC-SCNC: 9.6 MMOL/L (ref 8–16)
BASOPHILS # BLD AUTO: 0.1 10*3/UL (ref 0–0.1)
BASOPHILS NFR BLD AUTO: 0.6 % (ref 0–1)
BUN SERPL-MCNC: 15 MG/DL (ref 7–26)
BUN/CREAT SERPL: 25 (ref 6–25)
CALCIUM SERPL-MCNC: 6.8 MG/DL (ref 8.4–10.2)
CHLORIDE SERPL-SCNC: 115 MMOL/L (ref 98–107)
CO2 SERPL-SCNC: 19 MMOL/L (ref 22–29)
DEPRECATED NEUTROPHILS # BLD AUTO: 6.6 10*3/UL (ref 2.1–6.9)
DEPRECATED PHOSPHATE SERPL-MCNC: 2.7 MG/DL (ref 2.3–4.7)
EOSINOPHIL # BLD AUTO: 0 10*3/UL (ref 0–0.4)
EOSINOPHIL NFR BLD AUTO: 0.5 % (ref 0–6)
ERYTHROCYTE [DISTWIDTH] IN CORD BLOOD: 16.6 % (ref 11.7–14.4)
GLUCOSE SERPLBLD-MCNC: 96 MG/DL (ref 74–118)
HCT VFR BLD AUTO: 24.3 % (ref 34.2–44.1)
HGB BLD-MCNC: 7.9 G/DL (ref 12–16)
LACTOFERRIN STL QL: POSITIVE
LYMPHOCYTES # BLD: 1 10*3/UL (ref 1–3.2)
LYMPHOCYTES NFR BLD AUTO: 12.2 % (ref 18–39.1)
MAGNESIUM SERPL-MCNC: 1.7 MG/DL (ref 1.3–2.1)
MCH RBC QN AUTO: 27.4 PG (ref 28–32)
MCHC RBC AUTO-ENTMCNC: 32.5 G/DL (ref 31–35)
MCV RBC AUTO: 84.4 FL (ref 81–99)
MONOCYTES # BLD AUTO: 0.6 10*3/UL (ref 0.2–0.8)
MONOCYTES NFR BLD AUTO: 7.5 % (ref 4.4–11.3)
NEUTS SEG NFR BLD AUTO: 78.6 % (ref 38.7–80)
PLATELET # BLD AUTO: 262 X10E3/UL (ref 140–360)
POTASSIUM SERPL-SCNC: 3.6 MMOL/L (ref 3.5–5.1)
RBC # BLD AUTO: 2.88 X10E6/UL (ref 3.6–5.1)
SODIUM SERPL-SCNC: 140 MMOL/L (ref 136–145)

## 2022-08-08 RX ADMIN — MESALAMINE SCH MG: 500 CAPSULE ORAL at 09:55

## 2022-08-08 RX ADMIN — SODIUM CHLORIDE SCH MLS/HR: 4.5 INJECTION, SOLUTION INTRAVENOUS at 21:19

## 2022-08-08 RX ADMIN — MESALAMINE SCH MG: 500 CAPSULE ORAL at 17:19

## 2022-08-08 RX ADMIN — METOPROLOL SUCCINATE SCH MG: 50 TABLET, EXTENDED RELEASE ORAL at 09:55

## 2022-08-08 RX ADMIN — MESALAMINE SCH MG: 500 CAPSULE ORAL at 21:00

## 2022-08-08 RX ADMIN — ZOLPIDEM TARTRATE PRN MG: 5 TABLET, FILM COATED ORAL at 21:10

## 2022-08-08 RX ADMIN — SODIUM CHLORIDE SCH MLS/HR: 9 INJECTION, SOLUTION INTRAVENOUS at 09:56

## 2022-08-08 RX ADMIN — Medication SCH MG: at 21:00

## 2022-08-08 RX ADMIN — MESALAMINE SCH MG: 500 CAPSULE ORAL at 12:01

## 2022-08-08 RX ADMIN — SODIUM CHLORIDE SCH MLS/HR: 4.5 INJECTION, SOLUTION INTRAVENOUS at 06:58

## 2022-08-09 VITALS — SYSTOLIC BLOOD PRESSURE: 120 MMHG | DIASTOLIC BLOOD PRESSURE: 80 MMHG

## 2022-08-09 VITALS — DIASTOLIC BLOOD PRESSURE: 58 MMHG | SYSTOLIC BLOOD PRESSURE: 120 MMHG

## 2022-08-09 VITALS — DIASTOLIC BLOOD PRESSURE: 61 MMHG | SYSTOLIC BLOOD PRESSURE: 127 MMHG

## 2022-08-09 VITALS — SYSTOLIC BLOOD PRESSURE: 125 MMHG | DIASTOLIC BLOOD PRESSURE: 57 MMHG

## 2022-08-09 VITALS — SYSTOLIC BLOOD PRESSURE: 117 MMHG | DIASTOLIC BLOOD PRESSURE: 61 MMHG

## 2022-08-09 VITALS — SYSTOLIC BLOOD PRESSURE: 149 MMHG | DIASTOLIC BLOOD PRESSURE: 64 MMHG

## 2022-08-09 VITALS — DIASTOLIC BLOOD PRESSURE: 80 MMHG | SYSTOLIC BLOOD PRESSURE: 120 MMHG

## 2022-08-09 LAB
HCT VFR BLD AUTO: 26.6 % (ref 34.2–44.1)
HGB BLD-MCNC: 8.2 G/DL (ref 12–16)

## 2022-08-09 RX ADMIN — SODIUM CHLORIDE SCH MLS/HR: 9 INJECTION, SOLUTION INTRAVENOUS at 08:30

## 2022-08-09 RX ADMIN — METRONIDAZOLE SCH MLS/HR: 500 INJECTION, SOLUTION INTRAVENOUS at 06:00

## 2022-08-09 RX ADMIN — MESALAMINE SCH MG: 500 CAPSULE ORAL at 08:30

## 2022-08-09 RX ADMIN — Medication SCH MG: at 21:47

## 2022-08-09 RX ADMIN — Medication SCH MG: at 10:48

## 2022-08-09 RX ADMIN — Medication SCH MG: at 21:43

## 2022-08-09 RX ADMIN — METOPROLOL SUCCINATE SCH MG: 50 TABLET, EXTENDED RELEASE ORAL at 21:42

## 2022-08-09 RX ADMIN — MESALAMINE SCH MG: 500 CAPSULE ORAL at 21:41

## 2022-08-09 RX ADMIN — METOPROLOL SUCCINATE SCH MG: 50 TABLET, EXTENDED RELEASE ORAL at 08:31

## 2022-08-09 RX ADMIN — MESALAMINE SCH MG: 500 CAPSULE ORAL at 17:26

## 2022-08-09 RX ADMIN — METRONIDAZOLE SCH MLS/HR: 500 INJECTION, SOLUTION INTRAVENOUS at 17:26

## 2022-08-09 RX ADMIN — SODIUM CHLORIDE SCH MLS/HR: 4.5 INJECTION, SOLUTION INTRAVENOUS at 15:14

## 2022-08-09 RX ADMIN — MESALAMINE SCH MG: 500 CAPSULE ORAL at 13:00

## 2022-08-09 RX ADMIN — METOPROLOL SUCCINATE SCH MG: 50 TABLET, EXTENDED RELEASE ORAL at 10:49

## 2022-08-09 RX ADMIN — METRONIDAZOLE SCH MLS/HR: 500 INJECTION, SOLUTION INTRAVENOUS at 11:12

## 2022-08-09 RX ADMIN — MESALAMINE SCH MG: 500 CAPSULE ORAL at 15:13

## 2022-08-10 VITALS — SYSTOLIC BLOOD PRESSURE: 102 MMHG | DIASTOLIC BLOOD PRESSURE: 57 MMHG

## 2022-08-10 VITALS — SYSTOLIC BLOOD PRESSURE: 103 MMHG | DIASTOLIC BLOOD PRESSURE: 54 MMHG

## 2022-08-10 VITALS — DIASTOLIC BLOOD PRESSURE: 54 MMHG | SYSTOLIC BLOOD PRESSURE: 103 MMHG

## 2022-08-10 VITALS — DIASTOLIC BLOOD PRESSURE: 50 MMHG | SYSTOLIC BLOOD PRESSURE: 97 MMHG

## 2022-08-10 RX ADMIN — Medication SCH MG: at 09:00

## 2022-08-10 RX ADMIN — METOPROLOL SUCCINATE SCH MG: 50 TABLET, EXTENDED RELEASE ORAL at 09:00

## 2022-08-10 RX ADMIN — METRONIDAZOLE SCH MLS/HR: 500 INJECTION, SOLUTION INTRAVENOUS at 00:11

## 2022-08-10 RX ADMIN — MESALAMINE SCH MG: 500 CAPSULE ORAL at 09:32

## 2022-08-10 RX ADMIN — METRONIDAZOLE SCH MLS/HR: 500 INJECTION, SOLUTION INTRAVENOUS at 05:17
